# Patient Record
Sex: FEMALE | Race: WHITE | Employment: OTHER | URBAN - NONMETROPOLITAN AREA
[De-identification: names, ages, dates, MRNs, and addresses within clinical notes are randomized per-mention and may not be internally consistent; named-entity substitution may affect disease eponyms.]

---

## 2017-01-06 ENCOUNTER — CARE COORDINATION (OUTPATIENT)
Dept: CARE COORDINATION | Age: 77
End: 2017-01-06

## 2017-01-11 RX ORDER — PRAMIPEXOLE DIHYDROCHLORIDE 0.5 MG/1
0.5 TABLET ORAL 2 TIMES DAILY
Qty: 60 TABLET | Refills: 1 | Status: SHIPPED | OUTPATIENT
Start: 2017-01-11 | End: 2017-02-06 | Stop reason: SDUPTHER

## 2017-02-06 ENCOUNTER — OFFICE VISIT (OUTPATIENT)
Dept: PHYSICAL MEDICINE AND REHAB | Age: 77
End: 2017-02-06

## 2017-02-06 VITALS
BODY MASS INDEX: 27.79 KG/M2 | HEART RATE: 63 BPM | HEIGHT: 62 IN | SYSTOLIC BLOOD PRESSURE: 133 MMHG | WEIGHT: 151 LBS | DIASTOLIC BLOOD PRESSURE: 71 MMHG

## 2017-02-06 DIAGNOSIS — G20 PARKINSON'S DISEASE (HCC): Primary | ICD-10-CM

## 2017-02-06 PROCEDURE — 99213 OFFICE O/P EST LOW 20 MIN: CPT | Performed by: PSYCHIATRY & NEUROLOGY

## 2017-02-06 RX ORDER — PRAMIPEXOLE DIHYDROCHLORIDE 0.5 MG/1
0.5 TABLET ORAL 2 TIMES DAILY
Qty: 180 TABLET | Refills: 2 | Status: SHIPPED | OUTPATIENT
Start: 2017-02-06 | End: 2017-06-19 | Stop reason: SDUPTHER

## 2017-02-16 ENCOUNTER — CARE COORDINATION (OUTPATIENT)
Dept: CARE COORDINATION | Age: 77
End: 2017-02-16

## 2017-03-21 ENCOUNTER — CARE COORDINATION (OUTPATIENT)
Dept: CARE COORDINATION | Age: 77
End: 2017-03-21

## 2017-04-17 ENCOUNTER — OFFICE VISIT (OUTPATIENT)
Dept: FAMILY MEDICINE CLINIC | Age: 77
End: 2017-04-17

## 2017-04-17 VITALS
TEMPERATURE: 97.7 F | HEIGHT: 62 IN | SYSTOLIC BLOOD PRESSURE: 126 MMHG | BODY MASS INDEX: 29.88 KG/M2 | DIASTOLIC BLOOD PRESSURE: 60 MMHG | HEART RATE: 64 BPM | WEIGHT: 162.4 LBS | RESPIRATION RATE: 16 BRPM

## 2017-04-17 DIAGNOSIS — E03.9 ACQUIRED HYPOTHYROIDISM: ICD-10-CM

## 2017-04-17 DIAGNOSIS — H61.22 IMPACTED CERUMEN OF LEFT EAR: ICD-10-CM

## 2017-04-17 DIAGNOSIS — B35.1 TOENAIL FUNGUS: ICD-10-CM

## 2017-04-17 DIAGNOSIS — H60.542 ECZEMA OF EXTERNAL EAR, LEFT: ICD-10-CM

## 2017-04-17 DIAGNOSIS — L29.9 EAR ITCHING: Primary | ICD-10-CM

## 2017-04-17 LAB — KOH PREP: NORMAL

## 2017-04-17 PROCEDURE — G8510 SCR DEP NEG, NO PLAN REQD: HCPCS | Performed by: FAMILY MEDICINE

## 2017-04-17 PROCEDURE — 99214 OFFICE O/P EST MOD 30 MIN: CPT | Performed by: FAMILY MEDICINE

## 2017-04-17 PROCEDURE — 3288F FALL RISK ASSESSMENT DOCD: CPT | Performed by: FAMILY MEDICINE

## 2017-04-17 RX ORDER — LEVOTHYROXINE SODIUM 0.05 MG/1
TABLET ORAL
Qty: 90 TABLET | Refills: 3 | Status: SHIPPED | OUTPATIENT
Start: 2017-04-17 | End: 2018-04-17 | Stop reason: SDUPTHER

## 2017-04-17 ASSESSMENT — PATIENT HEALTH QUESTIONNAIRE - PHQ9
2. FEELING DOWN, DEPRESSED OR HOPELESS: 0
1. LITTLE INTEREST OR PLEASURE IN DOING THINGS: 0
SUM OF ALL RESPONSES TO PHQ QUESTIONS 1-9: 0
SUM OF ALL RESPONSES TO PHQ9 QUESTIONS 1 & 2: 0

## 2017-04-17 ASSESSMENT — ENCOUNTER SYMPTOMS
VOMITING: 0
DIARRHEA: 0
SHORTNESS OF BREATH: 0
COUGH: 0
WHEEZING: 0
ABDOMINAL PAIN: 0
CONSTIPATION: 0
NAUSEA: 0
BLOOD IN STOOL: 0
STRIDOR: 0

## 2017-04-20 ENCOUNTER — TELEPHONE (OUTPATIENT)
Dept: FAMILY MEDICINE CLINIC | Age: 77
End: 2017-04-20

## 2017-04-25 ENCOUNTER — PROCEDURE VISIT (OUTPATIENT)
Dept: FAMILY MEDICINE CLINIC | Age: 77
End: 2017-04-25

## 2017-04-25 VITALS
HEART RATE: 76 BPM | SYSTOLIC BLOOD PRESSURE: 126 MMHG | RESPIRATION RATE: 16 BRPM | BODY MASS INDEX: 29.55 KG/M2 | DIASTOLIC BLOOD PRESSURE: 60 MMHG | TEMPERATURE: 98.4 F | WEIGHT: 160.6 LBS | HEIGHT: 62 IN

## 2017-04-25 DIAGNOSIS — L60.0 INGROWN TOENAIL: Primary | ICD-10-CM

## 2017-04-25 PROCEDURE — 11730 AVULSION NAIL PLATE SIMPLE 1: CPT | Performed by: FAMILY MEDICINE

## 2017-04-26 ASSESSMENT — ENCOUNTER SYMPTOMS: COLOR CHANGE: 0

## 2017-04-27 ENCOUNTER — CARE COORDINATION (OUTPATIENT)
Dept: CARE COORDINATION | Age: 77
End: 2017-04-27

## 2017-05-18 LAB — FUNGUS IDENTIFIED: NORMAL

## 2017-05-31 ENCOUNTER — CARE COORDINATION (OUTPATIENT)
Dept: CARE COORDINATION | Age: 77
End: 2017-05-31

## 2017-06-19 ENCOUNTER — OFFICE VISIT (OUTPATIENT)
Dept: PHYSICAL MEDICINE AND REHAB | Age: 77
End: 2017-06-19

## 2017-06-19 VITALS
BODY MASS INDEX: 30.91 KG/M2 | DIASTOLIC BLOOD PRESSURE: 70 MMHG | WEIGHT: 168 LBS | SYSTOLIC BLOOD PRESSURE: 150 MMHG | HEART RATE: 65 BPM | HEIGHT: 62 IN

## 2017-06-19 DIAGNOSIS — G20 PARKINSON'S DISEASE (HCC): Primary | ICD-10-CM

## 2017-06-19 PROCEDURE — 99213 OFFICE O/P EST LOW 20 MIN: CPT | Performed by: PSYCHIATRY & NEUROLOGY

## 2017-06-19 RX ORDER — PRAMIPEXOLE DIHYDROCHLORIDE 0.5 MG/1
0.5 TABLET ORAL 2 TIMES DAILY
Qty: 180 TABLET | Refills: 2 | Status: SHIPPED | OUTPATIENT
Start: 2017-06-19 | End: 2017-12-18 | Stop reason: SDUPTHER

## 2017-08-17 ENCOUNTER — OFFICE VISIT (OUTPATIENT)
Dept: FAMILY MEDICINE CLINIC | Age: 77
End: 2017-08-17
Payer: MEDICARE

## 2017-08-17 VITALS
SYSTOLIC BLOOD PRESSURE: 112 MMHG | BODY MASS INDEX: 30.76 KG/M2 | TEMPERATURE: 97.9 F | OXYGEN SATURATION: 98 % | WEIGHT: 168.2 LBS | DIASTOLIC BLOOD PRESSURE: 60 MMHG | RESPIRATION RATE: 16 BRPM | HEART RATE: 62 BPM

## 2017-08-17 DIAGNOSIS — T63.484A ALLERGIC REACTION TO INSECT STING, UNDETERMINED INTENT, INITIAL ENCOUNTER: Primary | ICD-10-CM

## 2017-08-17 PROCEDURE — 99213 OFFICE O/P EST LOW 20 MIN: CPT | Performed by: FAMILY MEDICINE

## 2017-08-17 RX ORDER — TRIAMCINOLONE ACETONIDE 40 MG/ML
40 INJECTION, SUSPENSION INTRA-ARTICULAR; INTRAMUSCULAR ONCE
Status: COMPLETED | OUTPATIENT
Start: 2017-08-17 | End: 2017-08-17

## 2017-08-17 RX ADMIN — TRIAMCINOLONE ACETONIDE 40 MG: 40 INJECTION, SUSPENSION INTRA-ARTICULAR; INTRAMUSCULAR at 10:17

## 2017-08-18 ASSESSMENT — ENCOUNTER SYMPTOMS
STRIDOR: 0
DIARRHEA: 0
ABDOMINAL PAIN: 0
NAUSEA: 0
CONSTIPATION: 0
COUGH: 0
SHORTNESS OF BREATH: 0
WHEEZING: 0
BLOOD IN STOOL: 0
VOMITING: 0

## 2017-09-05 ENCOUNTER — TELEPHONE (OUTPATIENT)
Dept: FAMILY MEDICINE CLINIC | Age: 77
End: 2017-09-05

## 2017-09-05 DIAGNOSIS — D70.9 NEUTROPENIA, UNSPECIFIED TYPE (HCC): ICD-10-CM

## 2017-09-05 DIAGNOSIS — G25.81 RESTLESS LEG SYNDROME: ICD-10-CM

## 2017-09-05 DIAGNOSIS — E03.9 ACQUIRED HYPOTHYROIDISM: ICD-10-CM

## 2017-09-05 DIAGNOSIS — R25.2 MUSCLE CRAMP: Primary | ICD-10-CM

## 2017-09-07 ENCOUNTER — HOSPITAL ENCOUNTER (OUTPATIENT)
Age: 77
Discharge: HOME | End: 2017-09-07
Payer: MEDICARE

## 2017-09-07 DIAGNOSIS — D70.9 NEUTROPENIA, UNSPECIFIED TYPE (HCC): ICD-10-CM

## 2017-09-07 DIAGNOSIS — E03.9 ACQUIRED HYPOTHYROIDISM: ICD-10-CM

## 2017-09-07 DIAGNOSIS — G25.81 RESTLESS LEG SYNDROME: ICD-10-CM

## 2017-09-07 DIAGNOSIS — R25.2 MUSCLE CRAMP: ICD-10-CM

## 2017-09-07 LAB
ANION GAP SERPL CALCULATED.3IONS-SCNC: 13 MEQ/L (ref 8–16)
BASOPHILS # BLD: 0.4 %
BASOPHILS ABSOLUTE: 0 THOU/MM3 (ref 0–0.1)
BUN BLDV-MCNC: 14 MG/DL (ref 7–22)
CALCIUM SERPL-MCNC: 10.3 MG/DL (ref 8.5–10.5)
CHLORIDE BLD-SCNC: 103 MEQ/L (ref 98–111)
CO2: 29 MEQ/L (ref 23–33)
CREAT SERPL-MCNC: 0.7 MG/DL (ref 0.4–1.2)
EOSINOPHIL # BLD: 1.3 %
EOSINOPHILS ABSOLUTE: 0.1 THOU/MM3 (ref 0–0.4)
GFR SERPL CREATININE-BSD FRML MDRD: 81 ML/MIN/1.73M2
GLUCOSE BLD-MCNC: 92 MG/DL (ref 70–108)
HCT VFR BLD CALC: 41.9 % (ref 37–47)
HEMOGLOBIN: 14 GM/DL (ref 12–16)
LYMPHOCYTES # BLD: 40.7 %
LYMPHOCYTES ABSOLUTE: 2.2 THOU/MM3 (ref 1–4.8)
MAGNESIUM: 2.3 MG/DL (ref 1.6–2.4)
MCH RBC QN AUTO: 30.7 PG (ref 27–31)
MCHC RBC AUTO-ENTMCNC: 33.4 GM/DL (ref 33–37)
MCV RBC AUTO: 91.8 FL (ref 81–99)
MONOCYTES # BLD: 5.8 %
MONOCYTES ABSOLUTE: 0.3 THOU/MM3 (ref 0.4–1.3)
NUCLEATED RED BLOOD CELLS: 0 /100 WBC
PDW BLD-RTO: 13.2 % (ref 11.5–14.5)
PLATELET # BLD: 195 THOU/MM3 (ref 130–400)
PMV BLD AUTO: 8.8 MCM (ref 7.4–10.4)
POTASSIUM SERPL-SCNC: 4.1 MEQ/L (ref 3.5–5.2)
RBC # BLD: 4.56 MILL/MM3 (ref 4.2–5.4)
RBC # BLD: NORMAL 10*6/UL
SEG NEUTROPHILS: 51.8 %
SEGMENTED NEUTROPHILS ABSOLUTE COUNT: 2.8 THOU/MM3 (ref 1.8–7.7)
SODIUM BLD-SCNC: 145 MEQ/L (ref 135–145)
TSH SERPL DL<=0.05 MIU/L-ACNC: 2.89 UIU/ML (ref 0.4–4.2)
WBC # BLD: 5.5 THOU/MM3 (ref 4.8–10.8)

## 2017-09-07 PROCEDURE — 80048 BASIC METABOLIC PNL TOTAL CA: CPT

## 2017-09-07 PROCEDURE — 36415 COLL VENOUS BLD VENIPUNCTURE: CPT

## 2017-09-07 PROCEDURE — 84443 ASSAY THYROID STIM HORMONE: CPT

## 2017-09-07 PROCEDURE — 83735 ASSAY OF MAGNESIUM: CPT

## 2017-09-07 PROCEDURE — 85025 COMPLETE CBC W/AUTO DIFF WBC: CPT

## 2017-09-08 ENCOUNTER — TELEPHONE (OUTPATIENT)
Dept: FAMILY MEDICINE CLINIC | Age: 77
End: 2017-09-08

## 2017-09-08 DIAGNOSIS — R25.2 CRAMP OF BOTH LOWER EXTREMITIES: Primary | ICD-10-CM

## 2017-09-08 DIAGNOSIS — G25.81 RESTLESS LEG SYNDROME: ICD-10-CM

## 2017-09-08 RX ORDER — GABAPENTIN 100 MG/1
100 CAPSULE ORAL NIGHTLY
Qty: 90 CAPSULE | Refills: 3 | Status: SHIPPED | OUTPATIENT
Start: 2017-09-08 | End: 2018-10-16 | Stop reason: SDUPTHER

## 2017-11-20 ENCOUNTER — OFFICE VISIT (OUTPATIENT)
Dept: FAMILY MEDICINE CLINIC | Age: 77
End: 2017-11-20
Payer: MEDICARE

## 2017-11-20 VITALS
SYSTOLIC BLOOD PRESSURE: 118 MMHG | RESPIRATION RATE: 16 BRPM | DIASTOLIC BLOOD PRESSURE: 60 MMHG | WEIGHT: 170 LBS | OXYGEN SATURATION: 98 % | TEMPERATURE: 97.7 F | BODY MASS INDEX: 31.09 KG/M2 | HEART RATE: 62 BPM

## 2017-11-20 DIAGNOSIS — R53.83 FATIGUE, UNSPECIFIED TYPE: ICD-10-CM

## 2017-11-20 DIAGNOSIS — G25.81 RESTLESS LEG SYNDROME: ICD-10-CM

## 2017-11-20 DIAGNOSIS — D70.9 NEUTROPENIA, UNSPECIFIED TYPE (HCC): ICD-10-CM

## 2017-11-20 DIAGNOSIS — Z91.81 AT HIGH RISK FOR FALLS: ICD-10-CM

## 2017-11-20 DIAGNOSIS — E03.9 ACQUIRED HYPOTHYROIDISM: ICD-10-CM

## 2017-11-20 DIAGNOSIS — G20 PARKINSON DISEASE (HCC): ICD-10-CM

## 2017-11-20 DIAGNOSIS — Z86.718 PERSONAL HISTORY OF DVT (DEEP VEIN THROMBOSIS): ICD-10-CM

## 2017-11-20 DIAGNOSIS — E78.00 PURE HYPERCHOLESTEROLEMIA: ICD-10-CM

## 2017-11-20 DIAGNOSIS — L82.1 SEBORRHEIC KERATOSES: Primary | ICD-10-CM

## 2017-11-20 PROCEDURE — 90662 IIV NO PRSV INCREASED AG IM: CPT | Performed by: FAMILY MEDICINE

## 2017-11-20 PROCEDURE — G0008 ADMIN INFLUENZA VIRUS VAC: HCPCS | Performed by: FAMILY MEDICINE

## 2017-11-20 PROCEDURE — 99214 OFFICE O/P EST MOD 30 MIN: CPT | Performed by: FAMILY MEDICINE

## 2017-11-20 RX ORDER — GABAPENTIN 100 MG/1
100 CAPSULE ORAL NIGHTLY
Qty: 90 CAPSULE | Refills: 3 | Status: CANCELLED | OUTPATIENT
Start: 2017-11-20

## 2017-11-20 RX ORDER — ROSUVASTATIN CALCIUM 10 MG/1
10 TABLET, COATED ORAL NIGHTLY
Qty: 90 TABLET | Refills: 3 | Status: SHIPPED | OUTPATIENT
Start: 2017-11-20 | End: 2018-10-22 | Stop reason: SDUPTHER

## 2017-11-20 RX ORDER — PRAMIPEXOLE DIHYDROCHLORIDE 0.5 MG/1
0.5 TABLET ORAL 2 TIMES DAILY
Qty: 180 TABLET | Refills: 2 | Status: CANCELLED | OUTPATIENT
Start: 2017-11-20

## 2017-11-20 RX ORDER — LEVOTHYROXINE SODIUM 0.05 MG/1
TABLET ORAL
Qty: 90 TABLET | Refills: 3 | Status: CANCELLED | OUTPATIENT
Start: 2017-11-20

## 2017-11-20 NOTE — PROGRESS NOTES
After obtaining consent, and per orders of Dr. Adithya Carrera, injection of Influenza HD 0.5mL IM was given in Left deltoid by Myrick Blizzard. Patient instructed to remain in clinic for 20 minutes afterwards, and to report any adverse reaction to me immediately.     VIS given

## 2017-11-20 NOTE — PROGRESS NOTES
Alex Sams is a 68 y.o. female who presents today for:   Chief Complaint   Patient presents with    Annual Exam    Nevus     left inside pinky finger no changes that she has noticed iritates rings         HPI:     HPI     Having some fatigue and mild nausea for about 1 mos  Thought maybe was due to thyroid or maybe diverticulosis. Changed diet to more fruits and vegetables, avoided nuts and corn, and after 2 weeks, has felt significantly better. Moderately severe symptoms. Mole on L pinky finger present for yrs, not changing. Irritated by ring on ring finger frequenly. Parkinson's- stable. Managed by neurology  No tremor. Movement overall pretty good. Leg cramps/RLS - doing well on current meds. Fell onto R knee 3 wks ago. No swelling. Mild pain. Was walking up steps carrying sewing machine and didn't raise leg high enough and tripped. H/o - No signs of recurrent DVT.'    Had DEXA done with [facility]    HLD- tolerating statin without myalgias. Lab Results   Component Value Date    CHOL 158 11/25/2016    CHOL 217 (H) 06/28/2016    CHOL 239 (H) 04/01/2016     Lab Results   Component Value Date    TRIG 254 (H) 11/25/2016    TRIG 280 (H) 06/28/2016    TRIG 357 (H) 04/01/2016     Lab Results   Component Value Date    HDL 43 11/25/2016    HDL 46 06/28/2016    HDL 37 04/01/2016     Lab Results   Component Value Date    LDLCALC 64 11/25/2016    LDLCALC 115 06/28/2016    LDLCALC 131 04/01/2016     No results found for: LABVLDL, VLDL  No results found for: CHOLHDLRATIO    Neutropenia- resolved on recent cbc    Lab Results   Component Value Date    WBC 5.5 09/07/2017    HGB 14.0 09/07/2017    HCT 41.9 09/07/2017    MCV 91.8 09/07/2017     09/07/2017           Patient's medications, allergies, past medical, surgical, social, and family histories were reviewed and updated as appropriate.     Outpatient Medications Prior to Visit   Medication Sig Dispense Refill    gabapentin (NEURONTIN) 100 MG capsule Take 1 capsule by mouth nightly For leg cramps 90 capsule 3    pramipexole (MIRAPEX) 0.5 MG tablet Take 1 tablet by mouth 2 times daily 180 tablet 2    Coenzyme Q10 (CO Q 10) 100 MG CAPS Take by mouth daily      levothyroxine (SYNTHROID) 50 MCG tablet TAKE 1 TABLET DAILY FOR LOW THYROID 90 tablet 3    ACETAMINOPHEN PO Take by mouth as needed      Probiotic Product (PROBIOTIC PO) Take 1 tablet by mouth daily Balance Propriety Probiotic Blend      rosuvastatin (CRESTOR) 10 MG tablet Take 1 tablet by mouth nightly 90 tablet 3    Calcium Carbonate (CALCIUM 600 PO) Take 600 mg by mouth daily      Cholecalciferol (VITAMIN D3) 2000 UNITS CAPS Take 2,000 Units by mouth daily      NONFORMULARY Bone restore 4 tab per day. 1000 IU Vit D3  And 300 mg Magnesium      Ascorbic Acid (VITAMIN C) 500 MG tablet Take 1,000 mg by mouth daily.  COLLAGEN PO Take by mouth Collagen C 3 tablets per day (joints)       fexofenadine (ALLEGRA) 60 MG tablet Take 60 mg by mouth daily as needed       No facility-administered medications prior to visit. Subjective:      Review of Systems   Constitutional: Positive for fatigue. Negative for activity change, appetite change, chills, diaphoresis, fever and unexpected weight change. Respiratory: Negative for cough, shortness of breath, wheezing and stridor. Cardiovascular: Negative for chest pain, palpitations and leg swelling. Gastrointestinal: Negative for abdominal pain, blood in stool, constipation, diarrhea, nausea and vomiting. Neurological: Negative for headaches.        Objective:     Vitals:    11/20/17 0913   BP: 118/60   Site: Right Arm   Position: Sitting   Cuff Size: Medium Adult   Pulse: 62   Resp: 16   Temp: 97.7 °F (36.5 °C)   TempSrc: Oral   SpO2: 98%   Weight: 170 lb (77.1 kg)     Wt Readings from Last 3 Encounters:   11/20/17 170 lb (77.1 kg)   08/17/17 168 lb 3.2 oz (76.3 kg)   06/19/17 168 lb (76.2 kg)     Physical Exam   Constitutional: She is oriented to person, place, and time. She appears well-developed and well-nourished. No distress. HENT:   Head: Normocephalic and atraumatic. Nose: Nose normal.   Mouth/Throat: Uvula is midline, oropharynx is clear and moist and mucous membranes are normal.   B/l ear canals 80% obstructed with wax. Flushed by MA   Eyes: Conjunctivae are normal. Right eye exhibits no discharge. Left eye exhibits no discharge. Neck: Normal range of motion. Neck supple. No tracheal deviation present. No thyromegaly present. Cardiovascular: Normal rate, regular rhythm and normal heart sounds. Exam reveals no gallop and no friction rub. No murmur heard. Pulmonary/Chest: Effort normal and breath sounds normal. No stridor. No respiratory distress. She has no wheezes. She has no rales. Abdominal: Soft. She exhibits no distension and no mass. There is no tenderness. There is no rebound and no guarding. Musculoskeletal: She exhibits no edema. Lymphadenopathy:     She has no cervical adenopathy. Neurological: She is alert and oriented to person, place, and time. Skin: Skin is warm and dry. No rash noted. She is not diaphoretic. Medial aspect L 5th finger with slightly raised tan SK   Psychiatric: She has a normal mood and affect. Her behavior is normal. Judgment and thought content normal.   Nursing note and vitals reviewed. Assessment/Plan:     1. Seborrheic keratoses     2. Fatigue, unspecified type     3. Pure hypercholesterolemia  rosuvastatin (CRESTOR) 10 MG tablet    Lipid Panel    Hepatic Function Panel   4. Acquired hypothyroidism     5. Restless leg syndrome     6. Parkinson disease ([address]     7. Neutropenia, unspecified type ([address]     8. At high risk for falls     9. Personal history of DVT (deep vein thrombosis)       As fatigue symptoms improved, wishes to monitor for now. Discussed monitoring SK vs referral to plastic surgery. Pt plans to monitor for now. Check above labs.     Keep f/u

## 2017-11-22 ASSESSMENT — ENCOUNTER SYMPTOMS
WHEEZING: 0
BLOOD IN STOOL: 0
DIARRHEA: 0
STRIDOR: 0
SHORTNESS OF BREATH: 0
CONSTIPATION: 0
VOMITING: 0
NAUSEA: 0
COUGH: 0
ABDOMINAL PAIN: 0

## 2017-11-28 ENCOUNTER — HOSPITAL ENCOUNTER (OUTPATIENT)
Age: 77
Discharge: HOME | End: 2017-11-28
Payer: MEDICARE

## 2017-11-28 LAB
ALBUMIN SERPL-MCNC: 4.3 G/DL (ref 3.5–5.1)
ALP BLD-CCNC: 56 U/L (ref 38–126)
ALT SERPL-CCNC: 15 U/L (ref 11–66)
AST SERPL-CCNC: 22 U/L (ref 5–40)
BILIRUB SERPL-MCNC: 0.7 MG/DL (ref 0.3–1.2)
BILIRUBIN DIRECT: < 0.2 MG/DL (ref 0–0.3)
CHOLESTEROL, TOTAL: 167 MG/DL (ref 100–199)
HDLC SERPL-MCNC: 53 MG/DL
LDL CHOLESTEROL CALCULATED: 72 MG/DL
TOTAL PROTEIN: 6.9 G/DL (ref 6.1–8)
TRIGL SERPL-MCNC: 209 MG/DL (ref 0–199)

## 2017-11-28 PROCEDURE — 36415 COLL VENOUS BLD VENIPUNCTURE: CPT

## 2017-11-28 PROCEDURE — 80076 HEPATIC FUNCTION PANEL: CPT

## 2017-11-28 PROCEDURE — 80061 LIPID PANEL: CPT

## 2017-12-18 ENCOUNTER — OFFICE VISIT (OUTPATIENT)
Dept: NEUROLOGY | Age: 77
End: 2017-12-18
Payer: MEDICARE

## 2017-12-18 VITALS
DIASTOLIC BLOOD PRESSURE: 66 MMHG | BODY MASS INDEX: 31.47 KG/M2 | WEIGHT: 171 LBS | HEART RATE: 74 BPM | HEIGHT: 62 IN | SYSTOLIC BLOOD PRESSURE: 136 MMHG

## 2017-12-18 DIAGNOSIS — G20 PARKINSON DISEASE (HCC): Primary | ICD-10-CM

## 2017-12-18 DIAGNOSIS — G25.81 RESTLESS LEG SYNDROME: ICD-10-CM

## 2017-12-18 PROCEDURE — 99213 OFFICE O/P EST LOW 20 MIN: CPT | Performed by: PSYCHIATRY & NEUROLOGY

## 2017-12-18 RX ORDER — PRAMIPEXOLE DIHYDROCHLORIDE 0.5 MG/1
0.5 TABLET ORAL 2 TIMES DAILY
Qty: 180 TABLET | Refills: 2 | Status: SHIPPED | OUTPATIENT
Start: 2017-12-18 | End: 2018-06-18 | Stop reason: SDUPTHER

## 2017-12-18 NOTE — PROGRESS NOTES
Disp: , Rfl:       PE:   Vitals:    12/18/17 1302   BP: 136/66   Site: Left Arm   Position: Sitting   Cuff Size: Medium Adult   Pulse: 74   Weight: 171 lb (77.6 kg)   Height: 5' 2\" (1.575 m)     General Appearance:  alert and cooperative  Gen: AO3, NAD, Language is Intact. Head: PERRL, EOMI, no icterus  Neck: There is no carotid bruits. The Neck is supple. Neuro: CN 2-12 grossly intact with no focal deficits. Power 5/5 Throughout symmetric, Reflexes are symmetric. Long tracts are intact. Cerebellar exam is Intact. Sensory exam is intact to light touch. Gait is intact. There is no resting tremor, no pinrolling, mild bradykinesia, no Hypohonia, normal blink rate, no difficulty exiting chair, normal arm swing, no Gait shuffling. Musculoskeletal:  Has no hand arthritis, no limitation of ROM in any of the four extremities. Lower extremities no edema        DATA:  Results for orders placed or performed during the hospital encounter of 11/28/17   Hepatic Function Panel   Result Value Ref Range    Alb 4.3 3.5 - 5.1 g/dL    Total Bilirubin 0.7 0.3 - 1.2 mg/dL    Bilirubin, Direct <0.2 0.0 - 0.3 mg/dL    Alkaline Phosphatase 56 38 - 126 U/L    AST 22 5 - 40 U/L    ALT 15 11 - 66 U/L    Total Protein 6.9 6.1 - 8.0 g/dL   Lipid Panel   Result Value Ref Range    Cholesterol, Total 167 100 - 199 mg/dL    Triglycerides 209 (H) 0 - 199 mg/dL    HDL 53 mg/dL    LDL Calculated 72 mg/dL            Assessment:    1. Parkinson disease ([address]     2. Restless leg syndrome        She is doing well. He is pleased with how she is doing. No side effects to the medication. She is pleased with how she is doing. After detailed discussion with patient we agreed on the following plan. Plan:  1. Continue with Mirapex 0.5 mg twice a day. Refills given. 2. Stay Active. 3.  Follow up in 6 months or sooner if needed. 4. Call if any questions or concerns. Please call if any questions.      Christy Chapman MD

## 2017-12-18 NOTE — PATIENT INSTRUCTIONS
1.  Continue with Mirapex 0.5 mg twice a day. Refills given. 2. Stay Active. 3.  Follow up in 6 months or sooner if needed. 4. Call if any questions or concerns.

## 2018-04-21 ENCOUNTER — APPOINTMENT (OUTPATIENT)
Dept: CT IMAGING | Age: 78
DRG: 392 | End: 2018-04-21
Payer: MEDICARE

## 2018-04-21 ENCOUNTER — HOSPITAL ENCOUNTER (INPATIENT)
Age: 78
LOS: 2 days | Discharge: HOME HEALTH | DRG: 392 | End: 2018-04-24
Attending: EMERGENCY MEDICINE | Admitting: FAMILY MEDICINE
Payer: MEDICARE

## 2018-04-21 DIAGNOSIS — K57.92 ACUTE DIVERTICULITIS: Primary | ICD-10-CM

## 2018-04-21 DIAGNOSIS — R10.30 LOWER ABDOMINAL PAIN: ICD-10-CM

## 2018-04-21 LAB
ALBUMIN SERPL-MCNC: 3.4 GM/DL (ref 3.4–5)
ALP BLD-CCNC: 67 U/L (ref 46–116)
ALT SERPL-CCNC: 19 U/L (ref 14–63)
ANION GAP: 10 MEQ/L (ref 8–16)
AST SERPL-CCNC: 20 U/L (ref 15–37)
BASOPHILS # BLD: 0.4 % (ref 0–3)
BILIRUB SERPL-MCNC: 1.1 MG/DL (ref 0.2–1)
BUN BLDV-MCNC: 15 MG/DL (ref 7–18)
CHLORIDE BLD-SCNC: 101 MEQ/L (ref 98–107)
CO2: 27 MEQ/L (ref 21–32)
CREAT SERPL-MCNC: 0.8 MG/DL (ref 0.6–1.3)
EOSINOPHILS RELATIVE PERCENT: 0.7 % (ref 0–4)
GFR, ESTIMATED: 74 ML/MIN/1.73M2
GLUCOSE BLD-MCNC: 121 MG/DL (ref 74–106)
HCT VFR BLD CALC: 40.8 % (ref 37–47)
HEMOGLOBIN: 13.6 GM/DL (ref 12–16)
LIPASE: 91 U/L (ref 73–393)
LYMPHOCYTES # BLD: 13.9 % (ref 15–47)
MCH RBC QN AUTO: 29.6 PG (ref 27–31)
MCHC RBC AUTO-ENTMCNC: 33.2 GM/DL (ref 33–37)
MCV RBC AUTO: 89 FL (ref 81–99)
MONOCYTES: 5.4 % (ref 0–12)
PDW BLD-RTO: 13.2 % (ref 11.5–14.5)
PLATELET # BLD: 180 THOU/MM3 (ref 130–400)
PMV BLD AUTO: 7.9 FL (ref 7.4–10.4)
POC CALCIUM: 8.5 MG/DL (ref 8.5–10.1)
POTASSIUM SERPL-SCNC: 4 MEQ/L (ref 3.5–5.1)
RBC # BLD: 4.58 MILL/MM3 (ref 4.2–5.4)
SEGS: 79.6 % (ref 43–75)
SODIUM BLD-SCNC: 138 MEQ/L (ref 136–145)
TOTAL PROTEIN: 7.2 GM/DL (ref 6.4–8.2)
WBC # BLD: 11.7 THOU/MM3 (ref 4.8–10.8)

## 2018-04-21 PROCEDURE — 6360000002 HC RX W HCPCS: Performed by: EMERGENCY MEDICINE

## 2018-04-21 PROCEDURE — 80053 COMPREHEN METABOLIC PANEL: CPT

## 2018-04-21 PROCEDURE — 36415 COLL VENOUS BLD VENIPUNCTURE: CPT

## 2018-04-21 PROCEDURE — 87086 URINE CULTURE/COLONY COUNT: CPT

## 2018-04-21 PROCEDURE — 74177 CT ABD & PELVIS W/CONTRAST: CPT

## 2018-04-21 PROCEDURE — 83690 ASSAY OF LIPASE: CPT

## 2018-04-21 PROCEDURE — 81001 URINALYSIS AUTO W/SCOPE: CPT

## 2018-04-21 PROCEDURE — 85025 COMPLETE CBC W/AUTO DIFF WBC: CPT

## 2018-04-21 PROCEDURE — 96375 TX/PRO/DX INJ NEW DRUG ADDON: CPT

## 2018-04-21 PROCEDURE — 2580000003 HC RX 258: Performed by: EMERGENCY MEDICINE

## 2018-04-21 PROCEDURE — 6360000004 HC RX CONTRAST MEDICATION: Performed by: EMERGENCY MEDICINE

## 2018-04-21 PROCEDURE — 99285 EMERGENCY DEPT VISIT HI MDM: CPT

## 2018-04-21 RX ORDER — 0.9 % SODIUM CHLORIDE 0.9 %
1000 INTRAVENOUS SOLUTION INTRAVENOUS ONCE
Status: COMPLETED | OUTPATIENT
Start: 2018-04-21 | End: 2018-04-22

## 2018-04-21 RX ORDER — ONDANSETRON 2 MG/ML
4 INJECTION INTRAMUSCULAR; INTRAVENOUS ONCE
Status: COMPLETED | OUTPATIENT
Start: 2018-04-21 | End: 2018-04-21

## 2018-04-21 RX ADMIN — IOPAMIDOL 100 ML: 755 INJECTION, SOLUTION INTRAVENOUS at 23:52

## 2018-04-21 RX ADMIN — ONDANSETRON 4 MG: 2 INJECTION INTRAMUSCULAR; INTRAVENOUS at 22:49

## 2018-04-21 RX ADMIN — HYDROMORPHONE HYDROCHLORIDE 0.5 MG: 1 INJECTION, SOLUTION INTRAMUSCULAR; INTRAVENOUS; SUBCUTANEOUS at 22:52

## 2018-04-21 RX ADMIN — SODIUM CHLORIDE 1000 ML: 9 INJECTION, SOLUTION INTRAVENOUS at 22:51

## 2018-04-21 ASSESSMENT — ENCOUNTER SYMPTOMS
ABDOMINAL PAIN: 1
BLOOD IN STOOL: 0
TROUBLE SWALLOWING: 0
SHORTNESS OF BREATH: 0
SORE THROAT: 0
NAUSEA: 1
DIARRHEA: 0
VOMITING: 0
WHEEZING: 0

## 2018-04-21 ASSESSMENT — PAIN SCALES - GENERAL
PAINLEVEL_OUTOF10: 10
PAINLEVEL_OUTOF10: 2
PAINLEVEL_OUTOF10: 10

## 2018-04-21 ASSESSMENT — PAIN DESCRIPTION - LOCATION
LOCATION: ABDOMEN
LOCATION: ABDOMEN

## 2018-04-21 ASSESSMENT — PAIN DESCRIPTION - PAIN TYPE
TYPE: ACUTE PAIN
TYPE: ACUTE PAIN

## 2018-04-21 ASSESSMENT — PAIN DESCRIPTION - ORIENTATION: ORIENTATION: LOWER

## 2018-04-22 PROBLEM — K57.92 ACUTE DIVERTICULITIS: Status: ACTIVE | Noted: 2018-04-22

## 2018-04-22 PROBLEM — R10.32 LEFT LOWER QUADRANT PAIN: Status: ACTIVE | Noted: 2018-04-22

## 2018-04-22 PROBLEM — N39.0 UTI (URINARY TRACT INFECTION): Status: ACTIVE | Noted: 2018-04-22

## 2018-04-22 LAB
AMORPHOUS: ABNORMAL
BACTERIA: ABNORMAL
BILIRUBIN URINE: NEGATIVE
BLOOD, URINE: ABNORMAL
CASTS UA: ABNORMAL /LPF
CHARACTER, URINE: CLEAR
COLOR: YELLOW
CRYSTALS, UA: ABNORMAL
EPITHELIAL CELLS, UA: ABNORMAL /HPF
GLUCOSE, URINE: NEGATIVE MG/DL
KETONES, URINE: 15
LACTIC ACID: 0.7 MMOL/L (ref 0.5–2.2)
LEUKOCYTE ESTERASE, URINE: ABNORMAL
MUCUS: ABNORMAL
NITRITE, URINE: NEGATIVE
PH UA: 6.5 (ref 5–9)
PROTEIN UA: NEGATIVE MG/DL
RBC UA: ABNORMAL /HPF
REFLEX TO URINE C & S: ABNORMAL
SPECIFIC GRAVITY UA: 1.01 (ref 1–1.03)
UROBILINOGEN, URINE: 0.2 EU/DL (ref 0–1)
WBC UA: ABNORMAL /HPF

## 2018-04-22 PROCEDURE — 6360000002 HC RX W HCPCS: Performed by: FAMILY MEDICINE

## 2018-04-22 PROCEDURE — 6370000000 HC RX 637 (ALT 250 FOR IP): Performed by: FAMILY MEDICINE

## 2018-04-22 PROCEDURE — G0378 HOSPITAL OBSERVATION PER HR: HCPCS

## 2018-04-22 PROCEDURE — 96367 TX/PROPH/DG ADDL SEQ IV INF: CPT

## 2018-04-22 PROCEDURE — 1200000000 HC SEMI PRIVATE

## 2018-04-22 PROCEDURE — 83605 ASSAY OF LACTIC ACID: CPT

## 2018-04-22 PROCEDURE — 96365 THER/PROPH/DIAG IV INF INIT: CPT

## 2018-04-22 PROCEDURE — 2500000003 HC RX 250 WO HCPCS: Performed by: EMERGENCY MEDICINE

## 2018-04-22 PROCEDURE — 2580000003 HC RX 258: Performed by: FAMILY MEDICINE

## 2018-04-22 PROCEDURE — 99222 1ST HOSP IP/OBS MODERATE 55: CPT | Performed by: FAMILY MEDICINE

## 2018-04-22 PROCEDURE — 6360000002 HC RX W HCPCS: Performed by: EMERGENCY MEDICINE

## 2018-04-22 PROCEDURE — 36415 COLL VENOUS BLD VENIPUNCTURE: CPT

## 2018-04-22 RX ORDER — ROSUVASTATIN CALCIUM 10 MG/1
10 TABLET, COATED ORAL NIGHTLY
Status: DISCONTINUED | OUTPATIENT
Start: 2018-04-22 | End: 2018-04-24 | Stop reason: HOSPADM

## 2018-04-22 RX ORDER — PRAMIPEXOLE DIHYDROCHLORIDE 0.25 MG/1
0.5 TABLET ORAL 2 TIMES DAILY
Status: DISCONTINUED | OUTPATIENT
Start: 2018-04-22 | End: 2018-04-24 | Stop reason: HOSPADM

## 2018-04-22 RX ORDER — GABAPENTIN 100 MG/1
100 CAPSULE ORAL NIGHTLY
Status: DISCONTINUED | OUTPATIENT
Start: 2018-04-22 | End: 2018-04-24 | Stop reason: HOSPADM

## 2018-04-22 RX ORDER — ONDANSETRON 2 MG/ML
4 INJECTION INTRAMUSCULAR; INTRAVENOUS EVERY 6 HOURS PRN
Status: DISCONTINUED | OUTPATIENT
Start: 2018-04-22 | End: 2018-04-24 | Stop reason: HOSPADM

## 2018-04-22 RX ORDER — MORPHINE SULFATE 2 MG/ML
1 INJECTION, SOLUTION INTRAMUSCULAR; INTRAVENOUS
Status: ACTIVE | OUTPATIENT
Start: 2018-04-22 | End: 2018-04-23

## 2018-04-22 RX ORDER — SODIUM CHLORIDE 0.9 % (FLUSH) 0.9 %
10 SYRINGE (ML) INJECTION EVERY 12 HOURS SCHEDULED
Status: DISCONTINUED | OUTPATIENT
Start: 2018-04-22 | End: 2018-04-24 | Stop reason: HOSPADM

## 2018-04-22 RX ORDER — ACETAMINOPHEN 325 MG/1
650 TABLET ORAL EVERY 4 HOURS PRN
Status: DISCONTINUED | OUTPATIENT
Start: 2018-04-22 | End: 2018-04-24 | Stop reason: HOSPADM

## 2018-04-22 RX ORDER — CIPROFLOXACIN 2 MG/ML
400 INJECTION, SOLUTION INTRAVENOUS EVERY 12 HOURS
Status: DISCONTINUED | OUTPATIENT
Start: 2018-04-22 | End: 2018-04-22

## 2018-04-22 RX ORDER — ASCORBIC ACID 500 MG
1000 TABLET ORAL DAILY
Status: DISCONTINUED | OUTPATIENT
Start: 2018-04-22 | End: 2018-04-24 | Stop reason: HOSPADM

## 2018-04-22 RX ORDER — SODIUM CHLORIDE 9 MG/ML
INJECTION, SOLUTION INTRAVENOUS CONTINUOUS
Status: ACTIVE | OUTPATIENT
Start: 2018-04-22 | End: 2018-04-22

## 2018-04-22 RX ORDER — MORPHINE SULFATE 2 MG/ML
2 INJECTION, SOLUTION INTRAMUSCULAR; INTRAVENOUS EVERY 4 HOURS PRN
Status: ACTIVE | OUTPATIENT
Start: 2018-04-22 | End: 2018-04-23

## 2018-04-22 RX ORDER — CIPROFLOXACIN 2 MG/ML
400 INJECTION, SOLUTION INTRAVENOUS ONCE
Status: COMPLETED | OUTPATIENT
Start: 2018-04-22 | End: 2018-04-22

## 2018-04-22 RX ORDER — CALCIUM CARBONATE 500(1250)
500 TABLET ORAL DAILY
Status: DISCONTINUED | OUTPATIENT
Start: 2018-04-22 | End: 2018-04-24 | Stop reason: HOSPADM

## 2018-04-22 RX ORDER — SODIUM CHLORIDE 0.9 % (FLUSH) 0.9 %
10 SYRINGE (ML) INJECTION PRN
Status: DISCONTINUED | OUTPATIENT
Start: 2018-04-22 | End: 2018-04-24 | Stop reason: HOSPADM

## 2018-04-22 RX ORDER — LEVOFLOXACIN 5 MG/ML
500 INJECTION, SOLUTION INTRAVENOUS EVERY 24 HOURS
Status: DISCONTINUED | OUTPATIENT
Start: 2018-04-22 | End: 2018-04-23

## 2018-04-22 RX ORDER — LACTOBACILLUS RHAMNOSUS GG 10B CELL
1 CAPSULE ORAL DAILY
Status: DISCONTINUED | OUTPATIENT
Start: 2018-04-22 | End: 2018-04-24 | Stop reason: HOSPADM

## 2018-04-22 RX ORDER — METRONIDAZOLE 500 MG/1
500 TABLET ORAL EVERY 8 HOURS SCHEDULED
Status: DISCONTINUED | OUTPATIENT
Start: 2018-04-22 | End: 2018-04-24 | Stop reason: HOSPADM

## 2018-04-22 RX ORDER — LEVOTHYROXINE SODIUM 0.05 MG/1
50 TABLET ORAL DAILY
Status: DISCONTINUED | OUTPATIENT
Start: 2018-04-22 | End: 2018-04-24 | Stop reason: HOSPADM

## 2018-04-22 RX ADMIN — LEVOTHYROXINE SODIUM 50 MCG: 50 TABLET ORAL at 10:04

## 2018-04-22 RX ADMIN — Medication 1000 MG: at 08:28

## 2018-04-22 RX ADMIN — VITAMIN D, TAB 1000IU (100/BT) 2000 UNITS: 25 TAB at 08:28

## 2018-04-22 RX ADMIN — ROSUVASTATIN CALCIUM 10 MG: 10 TABLET, FILM COATED ORAL at 20:26

## 2018-04-22 RX ADMIN — CALCIUM 500 MG: 500 TABLET ORAL at 08:29

## 2018-04-22 RX ADMIN — METRONIDAZOLE 500 MG: 500 TABLET, FILM COATED ORAL at 20:26

## 2018-04-22 RX ADMIN — Medication 1 CAPSULE: at 08:28

## 2018-04-22 RX ADMIN — CIPROFLOXACIN 400 MG: 2 INJECTION, SOLUTION INTRAVENOUS at 00:19

## 2018-04-22 RX ADMIN — METRONIDAZOLE 500 MG: 500 TABLET, FILM COATED ORAL at 13:25

## 2018-04-22 RX ADMIN — LEVOFLOXACIN 500 MG: 5 INJECTION, SOLUTION INTRAVENOUS at 11:57

## 2018-04-22 RX ADMIN — METRONIDAZOLE 500 MG: 500 TABLET, FILM COATED ORAL at 08:28

## 2018-04-22 RX ADMIN — PRAMIPEXOLE DIHYDROCHLORIDE 0.5 MG: 0.25 TABLET ORAL at 08:28

## 2018-04-22 RX ADMIN — SODIUM CHLORIDE: 9 INJECTION, SOLUTION INTRAVENOUS at 13:24

## 2018-04-22 RX ADMIN — METRONIDAZOLE 500 MG: 500 INJECTION, SOLUTION INTRAVENOUS at 01:20

## 2018-04-22 RX ADMIN — GABAPENTIN 100 MG: 100 CAPSULE ORAL at 20:26

## 2018-04-22 RX ADMIN — ACETAMINOPHEN 650 MG: 325 TABLET ORAL at 11:59

## 2018-04-22 RX ADMIN — PRAMIPEXOLE DIHYDROCHLORIDE 0.5 MG: 0.25 TABLET ORAL at 20:26

## 2018-04-22 RX ADMIN — ACETAMINOPHEN 650 MG: 325 TABLET ORAL at 16:44

## 2018-04-22 ASSESSMENT — PAIN SCALES - GENERAL
PAINLEVEL_OUTOF10: 2
PAINLEVEL_OUTOF10: 4
PAINLEVEL_OUTOF10: 4
PAINLEVEL_OUTOF10: 1
PAINLEVEL_OUTOF10: 0
PAINLEVEL_OUTOF10: 4
PAINLEVEL_OUTOF10: 3
PAINLEVEL_OUTOF10: 3

## 2018-04-22 ASSESSMENT — PAIN DESCRIPTION - PAIN TYPE
TYPE: ACUTE PAIN
TYPE: ACUTE PAIN

## 2018-04-22 ASSESSMENT — PAIN DESCRIPTION - LOCATION
LOCATION: ABDOMEN
LOCATION: ABDOMEN

## 2018-04-22 ASSESSMENT — PAIN DESCRIPTION - ORIENTATION: ORIENTATION: LOWER

## 2018-04-23 PROBLEM — K76.0 HEPATIC STEATOSIS: Status: ACTIVE | Noted: 2018-04-23

## 2018-04-23 LAB
ANION GAP SERPL CALCULATED.3IONS-SCNC: 12 MEQ/L (ref 8–16)
BASOPHILS # BLD: 0.3 %
BASOPHILS ABSOLUTE: 0 THOU/MM3 (ref 0–0.1)
BUN BLDV-MCNC: 13 MG/DL (ref 7–22)
CALCIUM SERPL-MCNC: 8.3 MG/DL (ref 8.5–10.5)
CHLORIDE BLD-SCNC: 108 MEQ/L (ref 98–111)
CO2: 22 MEQ/L (ref 23–33)
CREAT SERPL-MCNC: 0.7 MG/DL (ref 0.4–1.2)
EOSINOPHIL # BLD: 0.8 %
EOSINOPHILS ABSOLUTE: 0.1 THOU/MM3 (ref 0–0.4)
GFR SERPL CREATININE-BSD FRML MDRD: 81 ML/MIN/1.73M2
GLUCOSE BLD-MCNC: 94 MG/DL (ref 70–108)
HCT VFR BLD CALC: 32.4 % (ref 37–47)
HEMOGLOBIN: 10.7 GM/DL (ref 12–16)
LYMPHOCYTES # BLD: 24.3 %
LYMPHOCYTES ABSOLUTE: 2 THOU/MM3 (ref 1–4.8)
MCH RBC QN AUTO: 30.3 PG (ref 27–31)
MCHC RBC AUTO-ENTMCNC: 33.1 GM/DL (ref 33–37)
MCV RBC AUTO: 91.4 FL (ref 81–99)
MONOCYTES # BLD: 5.3 %
MONOCYTES ABSOLUTE: 0.4 THOU/MM3 (ref 0.4–1.3)
NUCLEATED RED BLOOD CELLS: 0 /100 WBC
ORGANISM: ABNORMAL
PDW BLD-RTO: 13.7 % (ref 11.5–14.5)
PLATELET # BLD: 142 THOU/MM3 (ref 130–400)
PMV BLD AUTO: 9 FL (ref 7.4–10.4)
POTASSIUM REFLEX MAGNESIUM: 4.3 MEQ/L (ref 3.5–5.2)
RBC # BLD: 3.54 MILL/MM3 (ref 4.2–5.4)
SEG NEUTROPHILS: 69.3 %
SEGMENTED NEUTROPHILS ABSOLUTE COUNT: 5.7 THOU/MM3 (ref 1.8–7.7)
SODIUM BLD-SCNC: 142 MEQ/L (ref 135–145)
URINE CULTURE REFLEX: ABNORMAL
WBC # BLD: 8.2 THOU/MM3 (ref 4.8–10.8)

## 2018-04-23 PROCEDURE — 6360000002 HC RX W HCPCS: Performed by: FAMILY MEDICINE

## 2018-04-23 PROCEDURE — 80048 BASIC METABOLIC PNL TOTAL CA: CPT

## 2018-04-23 PROCEDURE — G0378 HOSPITAL OBSERVATION PER HR: HCPCS

## 2018-04-23 PROCEDURE — 2580000003 HC RX 258: Performed by: FAMILY MEDICINE

## 2018-04-23 PROCEDURE — 96366 THER/PROPH/DIAG IV INF ADDON: CPT

## 2018-04-23 PROCEDURE — 85025 COMPLETE CBC W/AUTO DIFF WBC: CPT

## 2018-04-23 PROCEDURE — 6370000000 HC RX 637 (ALT 250 FOR IP): Performed by: FAMILY MEDICINE

## 2018-04-23 PROCEDURE — 99232 SBSQ HOSP IP/OBS MODERATE 35: CPT | Performed by: INTERNAL MEDICINE

## 2018-04-23 PROCEDURE — 1200000000 HC SEMI PRIVATE

## 2018-04-23 PROCEDURE — 36415 COLL VENOUS BLD VENIPUNCTURE: CPT

## 2018-04-23 RX ORDER — CIPROFLOXACIN 2 MG/ML
400 INJECTION, SOLUTION INTRAVENOUS EVERY 12 HOURS
Status: DISCONTINUED | OUTPATIENT
Start: 2018-04-24 | End: 2018-04-24 | Stop reason: HOSPADM

## 2018-04-23 RX ADMIN — VITAMIN D, TAB 1000IU (100/BT) 2000 UNITS: 25 TAB at 09:22

## 2018-04-23 RX ADMIN — Medication 1000 MG: at 09:22

## 2018-04-23 RX ADMIN — LEVOFLOXACIN 500 MG: 5 INJECTION, SOLUTION INTRAVENOUS at 11:48

## 2018-04-23 RX ADMIN — Medication 1 CAPSULE: at 09:22

## 2018-04-23 RX ADMIN — METRONIDAZOLE 500 MG: 500 TABLET, FILM COATED ORAL at 13:41

## 2018-04-23 RX ADMIN — METRONIDAZOLE 500 MG: 500 TABLET, FILM COATED ORAL at 04:48

## 2018-04-23 RX ADMIN — PRAMIPEXOLE DIHYDROCHLORIDE 0.5 MG: 0.25 TABLET ORAL at 09:22

## 2018-04-23 RX ADMIN — Medication 10 ML: at 20:23

## 2018-04-23 RX ADMIN — PRAMIPEXOLE DIHYDROCHLORIDE 0.5 MG: 0.25 TABLET ORAL at 20:20

## 2018-04-23 RX ADMIN — CALCIUM 500 MG: 500 TABLET ORAL at 09:22

## 2018-04-23 RX ADMIN — METRONIDAZOLE 500 MG: 500 TABLET, FILM COATED ORAL at 22:07

## 2018-04-23 RX ADMIN — Medication 10 ML: at 11:48

## 2018-04-23 RX ADMIN — Medication 10 ML: at 13:07

## 2018-04-23 RX ADMIN — GABAPENTIN 100 MG: 100 CAPSULE ORAL at 20:20

## 2018-04-23 RX ADMIN — LEVOTHYROXINE SODIUM 50 MCG: 50 TABLET ORAL at 04:48

## 2018-04-23 RX ADMIN — ROSUVASTATIN CALCIUM 10 MG: 10 TABLET, FILM COATED ORAL at 20:20

## 2018-04-23 ASSESSMENT — ENCOUNTER SYMPTOMS
RECTAL PAIN: 0
ANAL BLEEDING: 0
CONSTIPATION: 0
SHORTNESS OF BREATH: 0
RHINORRHEA: 0
NAUSEA: 0
ABDOMINAL DISTENTION: 0
EYE PAIN: 0
VOMITING: 0
SINUS PRESSURE: 0
PHOTOPHOBIA: 0
SORE THROAT: 0
WHEEZING: 0
APNEA: 0
CHOKING: 0
COLOR CHANGE: 0
VOICE CHANGE: 0
ABDOMINAL PAIN: 1
EYE DISCHARGE: 0

## 2018-04-23 ASSESSMENT — PAIN SCALES - GENERAL
PAINLEVEL_OUTOF10: 0

## 2018-04-24 VITALS
WEIGHT: 169.8 LBS | SYSTOLIC BLOOD PRESSURE: 120 MMHG | BODY MASS INDEX: 32.06 KG/M2 | DIASTOLIC BLOOD PRESSURE: 58 MMHG | HEIGHT: 61 IN | RESPIRATION RATE: 16 BRPM | TEMPERATURE: 98.2 F | HEART RATE: 64 BPM | OXYGEN SATURATION: 93 %

## 2018-04-24 LAB
ALBUMIN SERPL-MCNC: 3 G/DL (ref 3.5–5.1)
ALP BLD-CCNC: 55 U/L (ref 38–126)
ALT SERPL-CCNC: 8 U/L (ref 11–66)
ANION GAP SERPL CALCULATED.3IONS-SCNC: 10 MEQ/L (ref 8–16)
AST SERPL-CCNC: 13 U/L (ref 5–40)
BILIRUB SERPL-MCNC: 0.3 MG/DL (ref 0.3–1.2)
BUN BLDV-MCNC: 10 MG/DL (ref 7–22)
CALCIUM SERPL-MCNC: 8.6 MG/DL (ref 8.5–10.5)
CHLORIDE BLD-SCNC: 107 MEQ/L (ref 98–111)
CO2: 23 MEQ/L (ref 23–33)
CREAT SERPL-MCNC: 0.7 MG/DL (ref 0.4–1.2)
GFR SERPL CREATININE-BSD FRML MDRD: 81 ML/MIN/1.73M2
GLUCOSE BLD-MCNC: 98 MG/DL (ref 70–108)
HCT VFR BLD CALC: 32.2 % (ref 37–47)
HEMOGLOBIN: 11.1 GM/DL (ref 12–16)
MCH RBC QN AUTO: 30.5 PG (ref 27–31)
MCHC RBC AUTO-ENTMCNC: 34.3 GM/DL (ref 33–37)
MCV RBC AUTO: 89.1 FL (ref 81–99)
PDW BLD-RTO: 13.3 % (ref 11.5–14.5)
PLATELET # BLD: 158 THOU/MM3 (ref 130–400)
PMV BLD AUTO: 9.1 FL (ref 7.4–10.4)
POTASSIUM SERPL-SCNC: 4 MEQ/L (ref 3.5–5.2)
RBC # BLD: 3.62 MILL/MM3 (ref 4.2–5.4)
SODIUM BLD-SCNC: 140 MEQ/L (ref 135–145)
TOTAL PROTEIN: 5.7 G/DL (ref 6.1–8)
WBC # BLD: 5.8 THOU/MM3 (ref 4.8–10.8)

## 2018-04-24 PROCEDURE — 6370000000 HC RX 637 (ALT 250 FOR IP): Performed by: FAMILY MEDICINE

## 2018-04-24 PROCEDURE — 96366 THER/PROPH/DIAG IV INF ADDON: CPT

## 2018-04-24 PROCEDURE — 85027 COMPLETE CBC AUTOMATED: CPT

## 2018-04-24 PROCEDURE — 80053 COMPREHEN METABOLIC PANEL: CPT

## 2018-04-24 PROCEDURE — 99239 HOSP IP/OBS DSCHRG MGMT >30: CPT | Performed by: INTERNAL MEDICINE

## 2018-04-24 PROCEDURE — 2580000003 HC RX 258: Performed by: FAMILY MEDICINE

## 2018-04-24 PROCEDURE — 6360000002 HC RX W HCPCS: Performed by: INTERNAL MEDICINE

## 2018-04-24 PROCEDURE — G0378 HOSPITAL OBSERVATION PER HR: HCPCS

## 2018-04-24 PROCEDURE — 36415 COLL VENOUS BLD VENIPUNCTURE: CPT

## 2018-04-24 RX ORDER — CIPROFLOXACIN 500 MG/1
500 TABLET, FILM COATED ORAL 2 TIMES DAILY
Qty: 14 TABLET | Refills: 0 | Status: SHIPPED | OUTPATIENT
Start: 2018-04-24 | End: 2020-01-09 | Stop reason: SDUPTHER

## 2018-04-24 RX ORDER — METRONIDAZOLE 500 MG/1
500 TABLET ORAL EVERY 8 HOURS SCHEDULED
Qty: 21 TABLET | Refills: 0 | Status: SHIPPED | OUTPATIENT
Start: 2018-04-24 | End: 2020-01-09 | Stop reason: SDUPTHER

## 2018-04-24 RX ADMIN — METRONIDAZOLE 500 MG: 500 TABLET, FILM COATED ORAL at 06:07

## 2018-04-24 RX ADMIN — CIPROFLOXACIN 400 MG: 2 INJECTION, SOLUTION INTRAVENOUS at 09:52

## 2018-04-24 RX ADMIN — Medication 1 CAPSULE: at 08:24

## 2018-04-24 RX ADMIN — PRAMIPEXOLE DIHYDROCHLORIDE 0.5 MG: 0.25 TABLET ORAL at 08:23

## 2018-04-24 RX ADMIN — VITAMIN D, TAB 1000IU (100/BT) 2000 UNITS: 25 TAB at 08:23

## 2018-04-24 RX ADMIN — CALCIUM 500 MG: 500 TABLET ORAL at 08:23

## 2018-04-24 RX ADMIN — LEVOTHYROXINE SODIUM 50 MCG: 50 TABLET ORAL at 06:07

## 2018-04-24 RX ADMIN — METRONIDAZOLE 500 MG: 500 TABLET, FILM COATED ORAL at 14:11

## 2018-04-24 RX ADMIN — Medication 1000 MG: at 08:23

## 2018-04-24 RX ADMIN — Medication 10 ML: at 09:52

## 2018-04-24 ASSESSMENT — PAIN SCALES - GENERAL: PAINLEVEL_OUTOF10: 0

## 2018-04-25 ENCOUNTER — TELEPHONE (OUTPATIENT)
Dept: PHARMACY | Facility: CLINIC | Age: 78
End: 2018-04-25

## 2018-04-25 DIAGNOSIS — K57.92 ACUTE DIVERTICULITIS: Primary | ICD-10-CM

## 2018-04-25 PROCEDURE — 1111F DSCHRG MED/CURRENT MED MERGE: CPT

## 2018-04-26 ENCOUNTER — TELEPHONE (OUTPATIENT)
Dept: FAMILY MEDICINE CLINIC | Age: 78
End: 2018-04-26

## 2018-04-30 ENCOUNTER — OFFICE VISIT (OUTPATIENT)
Dept: FAMILY MEDICINE CLINIC | Age: 78
End: 2018-04-30
Payer: MEDICARE

## 2018-04-30 VITALS
HEIGHT: 61 IN | HEART RATE: 68 BPM | OXYGEN SATURATION: 98 % | TEMPERATURE: 97 F | SYSTOLIC BLOOD PRESSURE: 138 MMHG | RESPIRATION RATE: 14 BRPM | DIASTOLIC BLOOD PRESSURE: 68 MMHG | WEIGHT: 168.6 LBS | BODY MASS INDEX: 31.83 KG/M2

## 2018-04-30 DIAGNOSIS — G20 PARKINSON DISEASE (HCC): ICD-10-CM

## 2018-04-30 DIAGNOSIS — K57.92 ACUTE DIVERTICULITIS: Primary | ICD-10-CM

## 2018-04-30 DIAGNOSIS — D64.9 NORMOCYTIC ANEMIA: ICD-10-CM

## 2018-04-30 DIAGNOSIS — M21.70 LEG LENGTH DISCREPANCY: ICD-10-CM

## 2018-04-30 DIAGNOSIS — M54.10 RADICULAR LOW BACK PAIN: ICD-10-CM

## 2018-04-30 PROCEDURE — 99495 TRANSJ CARE MGMT MOD F2F 14D: CPT | Performed by: FAMILY MEDICINE

## 2018-04-30 ASSESSMENT — PATIENT HEALTH QUESTIONNAIRE - PHQ9
SUM OF ALL RESPONSES TO PHQ QUESTIONS 1-9: 0
SUM OF ALL RESPONSES TO PHQ9 QUESTIONS 1 & 2: 0
1. LITTLE INTEREST OR PLEASURE IN DOING THINGS: 0
2. FEELING DOWN, DEPRESSED OR HOPELESS: 0

## 2018-05-01 ASSESSMENT — ENCOUNTER SYMPTOMS
NAUSEA: 0
BLOOD IN STOOL: 0
COUGH: 0
DIARRHEA: 0
SHORTNESS OF BREATH: 0
VOMITING: 0
ABDOMINAL PAIN: 0
WHEEZING: 0
STRIDOR: 0
CONSTIPATION: 0

## 2018-05-10 ENCOUNTER — HOSPITAL ENCOUNTER (OUTPATIENT)
Age: 78
Discharge: HOME | End: 2018-05-10
Payer: MEDICARE

## 2018-05-10 DIAGNOSIS — D64.9 NORMOCYTIC ANEMIA: ICD-10-CM

## 2018-05-10 LAB
BASOPHILS # BLD: 1.2 %
BASOPHILS ABSOLUTE: 0 THOU/MM3 (ref 0–0.1)
EOSINOPHIL # BLD: 2.1 %
EOSINOPHILS ABSOLUTE: 0.1 THOU/MM3 (ref 0–0.4)
HCT VFR BLD CALC: 39.1 % (ref 37–47)
HEMOGLOBIN: 13.1 GM/DL (ref 12–16)
LYMPHOCYTES # BLD: 41.7 %
LYMPHOCYTES ABSOLUTE: 1.5 THOU/MM3 (ref 1–4.8)
MCH RBC QN AUTO: 30.3 PG (ref 27–31)
MCHC RBC AUTO-ENTMCNC: 33.5 GM/DL (ref 33–37)
MCV RBC AUTO: 90.3 FL (ref 81–99)
MONOCYTES # BLD: 7 %
MONOCYTES ABSOLUTE: 0.3 THOU/MM3 (ref 0.4–1.3)
NUCLEATED RED BLOOD CELLS: 0 /100 WBC
PDW BLD-RTO: 13.9 % (ref 11.5–14.5)
PLATELET # BLD: 239 THOU/MM3 (ref 130–400)
PMV BLD AUTO: 9.2 FL (ref 7.4–10.4)
RBC # BLD: 4.33 MILL/MM3 (ref 4.2–5.4)
SEG NEUTROPHILS: 48 %
SEGMENTED NEUTROPHILS ABSOLUTE COUNT: 1.8 THOU/MM3 (ref 1.8–7.7)
WBC # BLD: 3.7 THOU/MM3 (ref 4.8–10.8)

## 2018-05-10 PROCEDURE — 85025 COMPLETE CBC W/AUTO DIFF WBC: CPT

## 2018-05-10 PROCEDURE — 36415 COLL VENOUS BLD VENIPUNCTURE: CPT

## 2018-05-11 ENCOUNTER — TELEPHONE (OUTPATIENT)
Dept: FAMILY MEDICINE CLINIC | Age: 78
End: 2018-05-11

## 2018-05-11 DIAGNOSIS — D72.819 LEUKOPENIA, UNSPECIFIED TYPE: Primary | ICD-10-CM

## 2018-05-22 PROBLEM — N39.0 UTI (URINARY TRACT INFECTION): Status: RESOLVED | Noted: 2018-04-22 | Resolved: 2018-05-22

## 2018-06-07 ENCOUNTER — HOSPITAL ENCOUNTER (OUTPATIENT)
Age: 78
Discharge: HOME | End: 2018-06-07
Payer: MEDICARE

## 2018-06-07 DIAGNOSIS — D72.819 LEUKOPENIA, UNSPECIFIED TYPE: ICD-10-CM

## 2018-06-07 LAB
BASOPHILS # BLD: 1 %
BASOPHILS ABSOLUTE: 0 THOU/MM3 (ref 0–0.1)
EOSINOPHIL # BLD: 1.6 %
EOSINOPHILS ABSOLUTE: 0.1 THOU/MM3 (ref 0–0.4)
HCT VFR BLD CALC: 40.5 % (ref 37–47)
HEMOGLOBIN: 13.5 GM/DL (ref 12–16)
LYMPHOCYTES # BLD: 41.2 %
LYMPHOCYTES ABSOLUTE: 1.8 THOU/MM3 (ref 1–4.8)
MCH RBC QN AUTO: 30.2 PG (ref 27–31)
MCHC RBC AUTO-ENTMCNC: 33.5 GM/DL (ref 33–37)
MCV RBC AUTO: 90.1 FL (ref 81–99)
MONOCYTES # BLD: 6.6 %
MONOCYTES ABSOLUTE: 0.3 THOU/MM3 (ref 0.4–1.3)
NUCLEATED RED BLOOD CELLS: 0 /100 WBC
PDW BLD-RTO: 13.7 % (ref 11.5–14.5)
PLATELET # BLD: 203 THOU/MM3 (ref 130–400)
PMV BLD AUTO: 9 FL (ref 7.4–10.4)
RBC # BLD: 4.49 MILL/MM3 (ref 4.2–5.4)
SEG NEUTROPHILS: 49.6 %
SEGMENTED NEUTROPHILS ABSOLUTE COUNT: 2.1 THOU/MM3 (ref 1.8–7.7)
WBC # BLD: 4.3 THOU/MM3 (ref 4.8–10.8)

## 2018-06-07 PROCEDURE — 85025 COMPLETE CBC W/AUTO DIFF WBC: CPT

## 2018-06-07 PROCEDURE — 36415 COLL VENOUS BLD VENIPUNCTURE: CPT

## 2018-06-14 ENCOUNTER — OFFICE VISIT (OUTPATIENT)
Dept: FAMILY MEDICINE CLINIC | Age: 78
End: 2018-06-14
Payer: MEDICARE

## 2018-06-14 ENCOUNTER — TELEPHONE (OUTPATIENT)
Dept: FAMILY MEDICINE CLINIC | Age: 78
End: 2018-06-14

## 2018-06-14 VITALS
RESPIRATION RATE: 14 BRPM | SYSTOLIC BLOOD PRESSURE: 148 MMHG | HEART RATE: 66 BPM | BODY MASS INDEX: 31.15 KG/M2 | HEIGHT: 61 IN | WEIGHT: 165 LBS | DIASTOLIC BLOOD PRESSURE: 80 MMHG | OXYGEN SATURATION: 96 %

## 2018-06-14 DIAGNOSIS — H81.10 BENIGN PAROXYSMAL POSITIONAL VERTIGO, UNSPECIFIED LATERALITY: ICD-10-CM

## 2018-06-14 DIAGNOSIS — E03.9 ACQUIRED HYPOTHYROIDISM: ICD-10-CM

## 2018-06-14 DIAGNOSIS — R26.89 IMBALANCE: Primary | ICD-10-CM

## 2018-06-14 LAB
ALBUMIN SERPL-MCNC: 4.6 G/DL (ref 3.5–5.1)
ALP BLD-CCNC: 64 U/L (ref 38–126)
ALT SERPL-CCNC: 18 U/L (ref 11–66)
ANION GAP SERPL CALCULATED.3IONS-SCNC: 12 MEQ/L (ref 8–16)
AST SERPL-CCNC: 23 U/L (ref 5–40)
BASOPHILS # BLD: 0.6 %
BASOPHILS ABSOLUTE: 0 THOU/MM3 (ref 0–0.1)
BILIRUB SERPL-MCNC: 0.5 MG/DL (ref 0.3–1.2)
BUN BLDV-MCNC: 15 MG/DL (ref 7–22)
CALCIUM SERPL-MCNC: 9.4 MG/DL (ref 8.5–10.5)
CHLORIDE BLD-SCNC: 101 MEQ/L (ref 98–111)
CO2: 27 MEQ/L (ref 23–33)
CREAT SERPL-MCNC: 0.7 MG/DL (ref 0.4–1.2)
EOSINOPHIL # BLD: 0.7 %
EOSINOPHILS ABSOLUTE: 0 THOU/MM3 (ref 0–0.4)
GFR SERPL CREATININE-BSD FRML MDRD: 81 ML/MIN/1.73M2
GLUCOSE BLD-MCNC: 98 MG/DL (ref 70–108)
HCT VFR BLD CALC: 41 % (ref 37–47)
HEMOGLOBIN: 13.8 GM/DL (ref 12–16)
LYMPHOCYTES # BLD: 34.8 %
LYMPHOCYTES ABSOLUTE: 1.6 THOU/MM3 (ref 1–4.8)
MCH RBC QN AUTO: 30.1 PG (ref 27–31)
MCHC RBC AUTO-ENTMCNC: 33.6 GM/DL (ref 33–37)
MCV RBC AUTO: 89.5 FL (ref 81–99)
MONOCYTES # BLD: 5.6 %
MONOCYTES ABSOLUTE: 0.3 THOU/MM3 (ref 0.4–1.3)
NUCLEATED RED BLOOD CELLS: 0 /100 WBC
PDW BLD-RTO: 13.3 % (ref 11.5–14.5)
PLATELET # BLD: 209 THOU/MM3 (ref 130–400)
PMV BLD AUTO: 9 FL (ref 7.4–10.4)
POTASSIUM SERPL-SCNC: 4.4 MEQ/L (ref 3.5–5.2)
RBC # BLD: 4.58 MILL/MM3 (ref 4.2–5.4)
SEG NEUTROPHILS: 58.3 %
SEGMENTED NEUTROPHILS ABSOLUTE COUNT: 2.7 THOU/MM3 (ref 1.8–7.7)
SODIUM BLD-SCNC: 140 MEQ/L (ref 135–145)
TOTAL PROTEIN: 7.5 G/DL (ref 6.1–8)
TSH SERPL DL<=0.05 MIU/L-ACNC: 1.89 UIU/ML (ref 0.4–4.2)
WBC # BLD: 4.6 THOU/MM3 (ref 4.8–10.8)

## 2018-06-14 PROCEDURE — 36415 COLL VENOUS BLD VENIPUNCTURE: CPT | Performed by: FAMILY MEDICINE

## 2018-06-14 PROCEDURE — 99214 OFFICE O/P EST MOD 30 MIN: CPT | Performed by: FAMILY MEDICINE

## 2018-06-14 PROCEDURE — 95992 CANALITH REPOSITIONING PROC: CPT | Performed by: FAMILY MEDICINE

## 2018-06-15 ASSESSMENT — ENCOUNTER SYMPTOMS
VOMITING: 0
ABDOMINAL PAIN: 0
STRIDOR: 0
CONSTIPATION: 0
NAUSEA: 1
DIARRHEA: 0
SHORTNESS OF BREATH: 0
WHEEZING: 0
BLOOD IN STOOL: 0
COUGH: 0

## 2018-06-18 ENCOUNTER — OFFICE VISIT (OUTPATIENT)
Dept: NEUROLOGY | Age: 78
End: 2018-06-18
Payer: MEDICARE

## 2018-06-18 VITALS
DIASTOLIC BLOOD PRESSURE: 80 MMHG | WEIGHT: 166 LBS | HEART RATE: 72 BPM | SYSTOLIC BLOOD PRESSURE: 128 MMHG | BODY MASS INDEX: 31.38 KG/M2

## 2018-06-18 DIAGNOSIS — G25.81 RESTLESS LEG SYNDROME: ICD-10-CM

## 2018-06-18 DIAGNOSIS — G20 PARKINSON DISEASE (HCC): Primary | ICD-10-CM

## 2018-06-18 PROCEDURE — 99213 OFFICE O/P EST LOW 20 MIN: CPT | Performed by: PSYCHIATRY & NEUROLOGY

## 2018-06-18 RX ORDER — PRAMIPEXOLE DIHYDROCHLORIDE 0.5 MG/1
0.5 TABLET ORAL 2 TIMES DAILY
Qty: 180 TABLET | Refills: 3 | Status: SHIPPED | OUTPATIENT
Start: 2018-06-18 | End: 2019-06-16 | Stop reason: SDUPTHER

## 2018-06-19 ENCOUNTER — TELEPHONE (OUTPATIENT)
Dept: FAMILY MEDICINE CLINIC | Age: 78
End: 2018-06-19

## 2018-06-19 DIAGNOSIS — H81.10 BENIGN PAROXYSMAL POSITIONAL VERTIGO, UNSPECIFIED LATERALITY: Primary | ICD-10-CM

## 2018-06-19 DIAGNOSIS — G20 PARKINSON DISEASE (HCC): ICD-10-CM

## 2018-06-19 DIAGNOSIS — R26.89 IMBALANCE: ICD-10-CM

## 2018-06-19 DIAGNOSIS — R42 VERTIGO: ICD-10-CM

## 2018-06-19 DIAGNOSIS — R25.1 TREMOR: ICD-10-CM

## 2018-06-26 ENCOUNTER — TELEPHONE (OUTPATIENT)
Dept: FAMILY MEDICINE CLINIC | Age: 78
End: 2018-06-26

## 2018-10-16 ENCOUNTER — OFFICE VISIT (OUTPATIENT)
Dept: FAMILY MEDICINE CLINIC | Age: 78
End: 2018-10-16
Payer: MEDICARE

## 2018-10-16 VITALS
DIASTOLIC BLOOD PRESSURE: 68 MMHG | BODY MASS INDEX: 31.79 KG/M2 | SYSTOLIC BLOOD PRESSURE: 126 MMHG | TEMPERATURE: 97.2 F | HEIGHT: 61 IN | WEIGHT: 168.4 LBS | HEART RATE: 68 BPM | RESPIRATION RATE: 12 BRPM

## 2018-10-16 DIAGNOSIS — E55.9 VITAMIN D DEFICIENCY: ICD-10-CM

## 2018-10-16 DIAGNOSIS — H61.23 BILATERAL IMPACTED CERUMEN: ICD-10-CM

## 2018-10-16 DIAGNOSIS — G25.81 RESTLESS LEG SYNDROME: ICD-10-CM

## 2018-10-16 DIAGNOSIS — G20 PARKINSON DISEASE (HCC): ICD-10-CM

## 2018-10-16 DIAGNOSIS — R25.2 CRAMP OF BOTH LOWER EXTREMITIES: Primary | ICD-10-CM

## 2018-10-16 DIAGNOSIS — E78.00 PURE HYPERCHOLESTEROLEMIA: ICD-10-CM

## 2018-10-16 DIAGNOSIS — E03.9 ACQUIRED HYPOTHYROIDISM: ICD-10-CM

## 2018-10-16 PROCEDURE — 99214 OFFICE O/P EST MOD 30 MIN: CPT | Performed by: FAMILY MEDICINE

## 2018-10-16 RX ORDER — GABAPENTIN 100 MG/1
100 CAPSULE ORAL NIGHTLY
Qty: 90 CAPSULE | Refills: 0 | Status: SHIPPED | OUTPATIENT
Start: 2018-10-16 | End: 2019-01-17 | Stop reason: SDUPTHER

## 2018-10-16 NOTE — PROGRESS NOTES
Explained ear wax removal procedure to patient with patient verbalizing understanding. Bilateral irrigation performed with warm irrigation fluid, noting  small amount of cerumen flushed from each ear successfully. Patient tolerated well. Ear canals now clear, tympanic membranes visible.

## 2018-10-16 NOTE — PROGRESS NOTES
No facility-administered medications prior to visit. Subjective:     Review of Systems   Constitutional: Positive for fatigue. Negative for activity change, appetite change, chills, diaphoresis, fever and unexpected weight change. HENT: Negative. Respiratory: Negative for cough, shortness of breath, wheezing and stridor. Cardiovascular: Negative for chest pain, palpitations and leg swelling. Gastrointestinal: Negative for abdominal pain, blood in stool, constipation, diarrhea, nausea and vomiting. Genitourinary: Negative. Skin: Negative. Neurological: Negative for headaches. Psychiatric/Behavioral: Negative. Objective:     Vitals:    10/16/18 0929   BP: 126/68   Site: Left Upper Arm   Position: Sitting   Cuff Size: Medium Adult   Pulse: 68   Resp: 12   Temp: 97.2 °F (36.2 °C)   TempSrc: Temporal   Weight: 168 lb 6.4 oz (76.4 kg)   Height: 5' 0.98\" (1.549 m)     Wt Readings from Last 3 Encounters:   10/16/18 168 lb 6.4 oz (76.4 kg)   06/18/18 166 lb (75.3 kg)   06/14/18 165 lb (74.8 kg)     Physical Exam   Constitutional: She is oriented to person, place, and time. She appears well-developed and well-nourished. No distress. HENT:   Head: Normocephalic and atraumatic. Right Ear: External ear normal.   Left Ear: External ear normal.   Nose: Nose normal.   Mouth/Throat: Uvula is midline, oropharynx is clear and moist and mucous membranes are normal.   B/l cerumen impaction- flushed by MA   Eyes: Conjunctivae are normal. Right eye exhibits no discharge. Left eye exhibits no discharge. Neck: Normal range of motion. Neck supple. No tracheal deviation present. No thyromegaly present. Cardiovascular: Normal rate, regular rhythm and normal heart sounds. Exam reveals no gallop and no friction rub. No murmur heard. Pulmonary/Chest: Effort normal and breath sounds normal. No stridor. No respiratory distress. She has no wheezes. She has no rales. Abdominal: Soft.  She exhibits no distension and no mass. There is no tenderness. There is no rebound and no guarding. Musculoskeletal: She exhibits no edema. Lymphadenopathy:     She has no cervical adenopathy. Neurological: She is alert and oriented to person, place, and time. Skin: Skin is warm and dry. No rash noted. She is not diaphoretic. Psychiatric: She has a normal mood and affect. Her behavior is normal. Judgment and thought content normal.   Nursing note and vitals reviewed. Assessment/Plan:      Diagnosis Orders   1. Cramp of both lower extremities  gabapentin (NEURONTIN) 100 MG capsule   2. Restless leg syndrome  gabapentin (NEURONTIN) 100 MG capsule   3. Parkinson disease ([address]     4. Pure hypercholesterolemia  Lipid Panel   5. Vitamin D deficiency  Vitamin D 25 Hydroxy   6. Acquired hypothyroidism     7. Bilateral impacted cerumen         Discussed trial of melatonin for sleep if needed. PD- well controlled    Chronic issues as above stable and control    Patient given educational materials- see patient instructions. Discussed use, benefit, and side effects of prescribedmedications. All patient questions answered. Pt voiced understanding. Reviewedhealth maintenance. Discussed medications, diet and exercise. Patient agreed withtreatment plan. Follow up as directed. Return in about 10 months (around 8/16/2019) for yearly.       (Please notethat portions of this note were completed with a voice recognition program. Effortswere made to edit the dictations but occasionally words are mis-transcribed.)

## 2018-10-19 ENCOUNTER — HOSPITAL ENCOUNTER (OUTPATIENT)
Age: 78
Discharge: HOME | End: 2018-10-19
Payer: MEDICARE

## 2018-10-19 DIAGNOSIS — E55.9 VITAMIN D DEFICIENCY: ICD-10-CM

## 2018-10-19 DIAGNOSIS — E78.00 PURE HYPERCHOLESTEROLEMIA: ICD-10-CM

## 2018-10-19 LAB
CHOLESTEROL, TOTAL: 164 MG/DL (ref 100–199)
HDLC SERPL-MCNC: 51 MG/DL
LDL CHOLESTEROL CALCULATED: 78 MG/DL
TRIGL SERPL-MCNC: 173 MG/DL (ref 0–199)
VITAMIN D 25-HYDROXY: 39 NG/ML (ref 30–100)

## 2018-10-19 PROCEDURE — 36415 COLL VENOUS BLD VENIPUNCTURE: CPT

## 2018-10-19 PROCEDURE — 80061 LIPID PANEL: CPT

## 2018-10-19 PROCEDURE — 82306 VITAMIN D 25 HYDROXY: CPT

## 2018-10-19 ASSESSMENT — ENCOUNTER SYMPTOMS
SHORTNESS OF BREATH: 0
WHEEZING: 0
NAUSEA: 0
STRIDOR: 0
VOMITING: 0
CONSTIPATION: 0
COUGH: 0
BLOOD IN STOOL: 0
ABDOMINAL PAIN: 0
DIARRHEA: 0

## 2018-10-22 ENCOUNTER — TELEPHONE (OUTPATIENT)
Dept: FAMILY MEDICINE CLINIC | Age: 78
End: 2018-10-22

## 2018-10-22 DIAGNOSIS — E78.00 PURE HYPERCHOLESTEROLEMIA: ICD-10-CM

## 2018-10-22 RX ORDER — ROSUVASTATIN CALCIUM 10 MG/1
10 TABLET, COATED ORAL NIGHTLY
Qty: 90 TABLET | Refills: 3 | Status: SHIPPED | OUTPATIENT
Start: 2018-10-22 | End: 2019-11-21 | Stop reason: SDUPTHER

## 2019-01-14 DIAGNOSIS — E03.9 ACQUIRED HYPOTHYROIDISM: ICD-10-CM

## 2019-01-14 RX ORDER — LEVOTHYROXINE SODIUM 0.05 MG/1
TABLET ORAL
Qty: 90 TABLET | Refills: 2 | Status: SHIPPED | OUTPATIENT
Start: 2019-01-14 | End: 2019-11-21 | Stop reason: SDUPTHER

## 2019-01-17 DIAGNOSIS — G25.81 RESTLESS LEG SYNDROME: ICD-10-CM

## 2019-01-17 DIAGNOSIS — R25.2 CRAMP OF BOTH LOWER EXTREMITIES: ICD-10-CM

## 2019-01-17 RX ORDER — GABAPENTIN 100 MG/1
100 CAPSULE ORAL NIGHTLY
Qty: 90 CAPSULE | Refills: 0 | Status: SHIPPED | OUTPATIENT
Start: 2019-01-17 | End: 2019-04-22 | Stop reason: SDUPTHER

## 2019-06-16 DIAGNOSIS — G25.81 RESTLESS LEG SYNDROME: Primary | ICD-10-CM

## 2019-06-17 RX ORDER — PRAMIPEXOLE DIHYDROCHLORIDE 0.5 MG/1
TABLET ORAL
Qty: 180 TABLET | Refills: 3 | Status: SHIPPED | OUTPATIENT
Start: 2019-06-17 | End: 2020-06-11

## 2019-07-15 ENCOUNTER — OFFICE VISIT (OUTPATIENT)
Dept: NEUROLOGY | Age: 79
End: 2019-07-15
Payer: MEDICARE

## 2019-07-15 VITALS
DIASTOLIC BLOOD PRESSURE: 72 MMHG | HEIGHT: 60 IN | HEART RATE: 68 BPM | WEIGHT: 157 LBS | SYSTOLIC BLOOD PRESSURE: 128 MMHG | BODY MASS INDEX: 30.82 KG/M2

## 2019-07-15 DIAGNOSIS — G20 PARKINSON DISEASE (HCC): Primary | ICD-10-CM

## 2019-07-15 DIAGNOSIS — G25.81 RESTLESS LEG SYNDROME: ICD-10-CM

## 2019-07-15 PROCEDURE — 99213 OFFICE O/P EST LOW 20 MIN: CPT | Performed by: PSYCHIATRY & NEUROLOGY

## 2019-08-19 ENCOUNTER — OFFICE VISIT (OUTPATIENT)
Dept: FAMILY MEDICINE CLINIC | Age: 79
End: 2019-08-19
Payer: MEDICARE

## 2019-08-19 VITALS
BODY MASS INDEX: 32.47 KG/M2 | SYSTOLIC BLOOD PRESSURE: 134 MMHG | WEIGHT: 165.4 LBS | DIASTOLIC BLOOD PRESSURE: 68 MMHG | TEMPERATURE: 97.2 F | HEIGHT: 60 IN | RESPIRATION RATE: 16 BRPM | HEART RATE: 76 BPM

## 2019-08-19 DIAGNOSIS — R06.83 SNORING: ICD-10-CM

## 2019-08-19 DIAGNOSIS — R25.2 CRAMP OF BOTH LOWER EXTREMITIES: ICD-10-CM

## 2019-08-19 DIAGNOSIS — E78.00 PURE HYPERCHOLESTEROLEMIA: ICD-10-CM

## 2019-08-19 DIAGNOSIS — E55.9 VITAMIN D DEFICIENCY: ICD-10-CM

## 2019-08-19 DIAGNOSIS — Z78.0 POSTMENOPAUSAL: ICD-10-CM

## 2019-08-19 DIAGNOSIS — E03.9 ACQUIRED HYPOTHYROIDISM: ICD-10-CM

## 2019-08-19 DIAGNOSIS — M85.89 OSTEOPENIA OF MULTIPLE SITES: ICD-10-CM

## 2019-08-19 DIAGNOSIS — K76.0 HEPATIC STEATOSIS: ICD-10-CM

## 2019-08-19 DIAGNOSIS — G25.81 RESTLESS LEG SYNDROME: ICD-10-CM

## 2019-08-19 DIAGNOSIS — R53.82 CHRONIC FATIGUE: Primary | ICD-10-CM

## 2019-08-19 DIAGNOSIS — G20 PARKINSON DISEASE (HCC): ICD-10-CM

## 2019-08-19 DIAGNOSIS — F43.21 ADJUSTMENT DISORDER WITH DEPRESSED MOOD: ICD-10-CM

## 2019-08-19 PROCEDURE — 99214 OFFICE O/P EST MOD 30 MIN: CPT | Performed by: FAMILY MEDICINE

## 2019-08-19 RX ORDER — GABAPENTIN 100 MG/1
100 CAPSULE ORAL NIGHTLY
Qty: 90 CAPSULE | Refills: 1 | Status: SHIPPED | OUTPATIENT
Start: 2019-08-19 | End: 2019-09-23 | Stop reason: SDUPTHER

## 2019-08-19 RX ORDER — ESCITALOPRAM OXALATE 10 MG/1
10 TABLET ORAL DAILY
Qty: 30 TABLET | Refills: 3 | Status: SHIPPED | OUTPATIENT
Start: 2019-08-19 | End: 2019-11-21

## 2019-08-19 ASSESSMENT — PATIENT HEALTH QUESTIONNAIRE - PHQ9
SUM OF ALL RESPONSES TO PHQ QUESTIONS 1-9: 0
SUM OF ALL RESPONSES TO PHQ QUESTIONS 1-9: 0
2. FEELING DOWN, DEPRESSED OR HOPELESS: 0
SUM OF ALL RESPONSES TO PHQ9 QUESTIONS 1 & 2: 0
1. LITTLE INTEREST OR PLEASURE IN DOING THINGS: 0

## 2019-08-19 NOTE — PATIENT INSTRUCTIONS
ringing in the ears. · Some people get shoulder pain that can be more severe and longer-lasting than routine soreness that can follow injections. This happens very rarely. · Any medication can cause a severe allergic reaction. Such reactions to a vaccine are estimated at about 1 in a million doses, and would happen within a few minutes to a few hours after the vaccination. As with any medicine, there is a very remote chance of a vaccine causing a serious injury or death. The safety of vaccines is always being monitored. For more information, visit: www.cdc.gov/vaccinesafety/  What if there is a serious problem? What should I look for? · Look for anything that concerns you, such as signs of a severe allergic reaction, very high fever, or unusual behavior. Signs of a severe allergic reaction can include hives, swelling of the face and throat, difficulty breathing, a fast heartbeat, dizziness, and weakness. These would usually start a few minutes to a few hours after the vaccination. What should I do? · If you think it is a severe allergic reaction or other emergency that can't wait, call 9-1-1 or get to the nearest hospital. Otherwise, call your health care provider. · Afterward, the reaction should be reported to the Vaccine Adverse Event Reporting System (VAERS). Your doctor should file this report, or you can do it yourself through the VAERS website at www.vaers. hhs.gov, or by calling 2-943.168.4367. VAERS does not give medical advice. .  How can I learn more? · Ask your health care provider. He or she can give you the vaccine package insert or suggest other sources of information. · Call your local or state health department. · Contact the Centers for Disease Control and Prevention (CDC):  ? Call 5-668.461.6813 (1-800-CDC-INFO) or  ?  Visit CDC's website at www.cdc.gov/vaccines  Vaccine Information Statement (Interim)  Recombinant Zoster Vaccine  2/12/2018  Department of Health and Human become thinner and weaker. It is much more common in women than in men. It is an early form of osteoporosis, a condition in which the bones are so thin and weak that they can break easily. It's important to know that low bone density is not a disease. It can happen normally with aging. Having low bone density means that there is a greater risk that you may get osteoporosis. It also means that you are more likely to break a bone than someone who does not have low bone density. But not everyone with low bone density gets osteoporosis or breaks a bone. Low bone density doesn't cause any symptoms. It's usually found with a type of X-ray called a bone density test. Low bone density means that your bone density result (T-score) is between -1.0 and -2.5. What increases your risk for low bone density? Things that increase your risk include:  · Having a family history of osteoporosis. · Being thin. · Being white or . · Getting too little physical activity. · Smoking. · Drinking too much alcohol often. · Using certain medicines such as steroids. How can you prevent osteoporosis? There are things you can do to help prevent osteoporosis. Certain lifestyle changes will help slow the loss of bone density. · Eat food that has plenty of calcium and vitamin D. Yogurt, cheese, milk, and dark green vegetables are high in calcium. Eggs, fatty fish, cereal, and fortified milk are high in vitamin D.  · Talk to your doctor about taking a calcium supplement that has vitamin D in it. · Get regular exercise. ? Do 30 minutes of weight-bearing exercise on most days of the week. Walking, jogging, stair climbing, and dancing are good choices. ? Do resistance exercises with weights or elastic bands 2 or 3 days a week. · Limit alcohol to 2 drinks a day for men and 1 drink a day for women. Too much alcohol can cause health problems. · Do not smoke. Smoking can make bones thin faster.  If you need help quitting, talk to your

## 2019-08-19 NOTE — PROGRESS NOTES
Padmini Tovar is a 66 y.o. female who presents today for:   Chief Complaint   Patient presents with    Annual Exam    Fatigue     3 weeks    Nausea     mild         HPI:      HPI     Not sleeping well for several mos. R leg pain was waking her up   had stroke end of May and this has increased her stress which has also affected her sleep. 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 Increased fatigue every day especially x 3 weeks. Better while resting, but then starts feeling more tired once up just for a bit during the day. Usually only sleeping 5-6 hours per night because of   Does snore    Nausea after eating for many months. .  Maybe slight improvement with changing to different wheat bread        Hypothyroidism: Patient complains of hypothyroidism. Symptoms include change in energy level. Patient denies change in energy level. Onset of symptoms was several years ago. Symptoms have stabilized. RLS symptoms- during middle of night. Gabapentin helps    PD- follows with .  Stable. No increase in tremor/trouble with gait. Patient's medications, allergies, past medical, surgical, social,and family histories were reviewed and updated as appropriate. Outpatient Medications Prior to Visit   Medication Sig Dispense Refill    pramipexole (MIRAPEX) 0.5 MG tablet TAKE 1 TABLET TWICE A  tablet 3    levothyroxine (SYNTHROID) 50 MCG tablet TAKE 1 TABLET DAILY FOR LOW THYROID 90 tablet 2    rosuvastatin (CRESTOR) 10 MG tablet Take 1 tablet by mouth nightly 90 tablet 3    Calcium Carb-Cholecalciferol (CALCIUM 600+D) 600-800 MG-UNIT TABS Take 1 tablet by mouth daily      Coenzyme Q10 (CO Q 10) 100 MG CAPS Take by mouth daily      ACETAMINOPHEN PO Take 1,000 mg by mouth daily as needed (pain)       Probiotic Product (PROBIOTIC PO) Take 1 tablet by mouth daily Balance Propriety Probiotic Blend      NONFORMULARY Bone restore 4 tab per day.  1000 IU Vit D3  And 300 mg Magnesium      sounds. Exam reveals no gallop and no friction rub. No murmur heard. Pulmonary/Chest: Effort normal and breath sounds normal. No stridor. No respiratory distress. She has no wheezes. She has no rales. Abdominal: Soft. She exhibits no distension and no mass. There is no tenderness. There is no rebound and no guarding. Musculoskeletal: She exhibits no edema. Lymphadenopathy:     She has no cervical adenopathy. Neurological: She is alert and oriented to person, place, and time. Skin: Skin is warm and dry. No rash noted. She is not diaphoretic. Psychiatric: She has a normal mood and affect. Her behavior is normal. Judgment and thought content normal.   Nursing note and vitals reviewed. Assessment/Plan:      Diagnosis Orders   1. Chronic fatigue  External Referral To Sleep Medicine   2. Acquired hypothyroidism  TSH   3. Vitamin D deficiency  Vitamin D 25 Hydroxy   4. Pure hypercholesterolemia  Lipid, Fasting    Comprehensive Metabolic Panel    CBC Auto Differential   5. Parkinson disease ([address]  Comprehensive Metabolic Panel    CBC Auto Differential    TSH   6. Hepatic steatosis  Comprehensive Metabolic Panel   7. Osteopenia of multiple sites  DEXA BONE DENSITY 2 SITES   8. Postmenopausal  DEXA BONE DENSITY 2 SITES   9. Cramp of both lower extremities  gabapentin (NEURONTIN) 100 MG capsule   10. Restless leg syndrome  gabapentin (NEURONTIN) 100 MG capsule   11. Snoring  External Referral To Sleep Medicine   12. Adjustment disorder with depressed mood  escitalopram (LEXAPRO) 10 MG tablet     Check labs  Ref for sleep study    Due for DEXA    Stressed importance of trying to get longer hours of sleep and more regular sleep pattern. Suspect some of her fatigue is depression related due to stress of caring for her . Discussed ways of getting rest, asking for help, avoiding caregiver burnout. Will start Lexapro as well for her symptoms.     Increase gabapentin to 2 tabs for 1 week then let me know if helps/RLS symptoms, and I can send in updated Rx 200 mg capsule in future if needed      Patient given educational materials- see patient instructions. Discussed use, benefit, and side effects of prescribedmedications. All patient questions answered. Pt voiced understanding. Reviewedhealth maintenance. Discussed medications, diet and exercise. Patient agreed withtreatment plan. Follow up as directed. Controlled Substance Monitoring:    Acute and Chronic Pain Monitoring:         Return in about 3 months (around 11/19/2019) for fatigue, f/u mood.       (Please notethat portions of this note were completed with a voice recognition program. Effortswere made to edit the dictations but occasionally words are mis-transcribed.)

## 2019-08-20 ENCOUNTER — NURSE ONLY (OUTPATIENT)
Dept: LAB | Age: 79
End: 2019-08-20

## 2019-08-20 ENCOUNTER — TELEPHONE (OUTPATIENT)
Dept: FAMILY MEDICINE CLINIC | Age: 79
End: 2019-08-20

## 2019-08-20 DIAGNOSIS — E03.9 ACQUIRED HYPOTHYROIDISM: ICD-10-CM

## 2019-08-20 DIAGNOSIS — E55.9 VITAMIN D DEFICIENCY: ICD-10-CM

## 2019-08-20 DIAGNOSIS — E78.00 PURE HYPERCHOLESTEROLEMIA: ICD-10-CM

## 2019-08-20 DIAGNOSIS — G20 PARKINSON DISEASE (HCC): ICD-10-CM

## 2019-08-20 DIAGNOSIS — K76.0 HEPATIC STEATOSIS: ICD-10-CM

## 2019-08-20 DIAGNOSIS — E87.5 HYPERKALEMIA: Primary | ICD-10-CM

## 2019-08-20 LAB
ALBUMIN SERPL-MCNC: 4.4 G/DL (ref 3.5–5.1)
ALP BLD-CCNC: 67 U/L (ref 38–126)
ALT SERPL-CCNC: 19 U/L (ref 11–66)
ANION GAP SERPL CALCULATED.3IONS-SCNC: 10 MEQ/L (ref 8–16)
AST SERPL-CCNC: 24 U/L (ref 5–40)
BASOPHILS # BLD: 1.4 %
BASOPHILS ABSOLUTE: 0.1 THOU/MM3 (ref 0–0.1)
BILIRUB SERPL-MCNC: 0.6 MG/DL (ref 0.3–1.2)
BUN BLDV-MCNC: 14 MG/DL (ref 7–22)
CALCIUM SERPL-MCNC: 10.3 MG/DL (ref 8.5–10.5)
CHLORIDE BLD-SCNC: 105 MEQ/L (ref 98–111)
CHOLESTEROL, FASTING: 170 MG/DL (ref 100–199)
CO2: 29 MEQ/L (ref 23–33)
CREAT SERPL-MCNC: 0.7 MG/DL (ref 0.4–1.2)
EOSINOPHIL # BLD: 2.6 %
EOSINOPHILS ABSOLUTE: 0.1 THOU/MM3 (ref 0–0.4)
ERYTHROCYTE [DISTWIDTH] IN BLOOD BY AUTOMATED COUNT: 12.8 % (ref 11.5–14.5)
ERYTHROCYTE [DISTWIDTH] IN BLOOD BY AUTOMATED COUNT: 45 FL (ref 35–45)
GFR SERPL CREATININE-BSD FRML MDRD: 81 ML/MIN/1.73M2
GLUCOSE BLD-MCNC: 96 MG/DL (ref 70–108)
HCT VFR BLD CALC: 43.9 % (ref 37–47)
HDLC SERPL-MCNC: 52 MG/DL
HEMOGLOBIN: 13.9 GM/DL (ref 12–16)
IMMATURE GRANS (ABS): 0.01 THOU/MM3 (ref 0–0.07)
IMMATURE GRANULOCYTES: 0 %
LDL CHOLESTEROL CALCULATED: 78 MG/DL
LYMPHOCYTES # BLD: 39.3 %
LYMPHOCYTES ABSOLUTE: 1.7 THOU/MM3 (ref 1–4.8)
MCH RBC QN AUTO: 30.3 PG (ref 26–33)
MCHC RBC AUTO-ENTMCNC: 31.7 GM/DL (ref 32.2–35.5)
MCV RBC AUTO: 95.6 FL (ref 81–99)
MONOCYTES # BLD: 7.4 %
MONOCYTES ABSOLUTE: 0.3 THOU/MM3 (ref 0.4–1.3)
NUCLEATED RED BLOOD CELLS: 0 /100 WBC
PLATELET # BLD: 195 THOU/MM3 (ref 130–400)
PMV BLD AUTO: 10.7 FL (ref 9.4–12.4)
POTASSIUM SERPL-SCNC: 5.3 MEQ/L (ref 3.5–5.2)
RBC # BLD: 4.59 MILL/MM3 (ref 4.2–5.4)
SEG NEUTROPHILS: 49.1 %
SEGMENTED NEUTROPHILS ABSOLUTE COUNT: 2.1 THOU/MM3 (ref 1.8–7.7)
SODIUM BLD-SCNC: 144 MEQ/L (ref 135–145)
TOTAL PROTEIN: 7.2 G/DL (ref 6.1–8)
TRIGLYCERIDE, FASTING: 202 MG/DL (ref 0–199)
TSH SERPL DL<=0.05 MIU/L-ACNC: 3.51 UIU/ML (ref 0.4–4.2)
VITAMIN D 25-HYDROXY: 31 NG/ML (ref 30–100)
WBC # BLD: 4.2 THOU/MM3 (ref 4.8–10.8)

## 2019-08-21 ASSESSMENT — ENCOUNTER SYMPTOMS
DIARRHEA: 0
WHEEZING: 0
BLOOD IN STOOL: 0
STRIDOR: 0
NAUSEA: 0
CONSTIPATION: 0
COUGH: 0
ABDOMINAL PAIN: 0
SHORTNESS OF BREATH: 0
VOMITING: 0

## 2019-08-26 ENCOUNTER — NURSE ONLY (OUTPATIENT)
Dept: LAB | Age: 79
End: 2019-08-26

## 2019-08-26 DIAGNOSIS — E87.5 HYPERKALEMIA: ICD-10-CM

## 2019-08-26 LAB — POTASSIUM SERPL-SCNC: 4.1 MEQ/L (ref 3.5–5.2)

## 2019-09-23 ENCOUNTER — TELEPHONE (OUTPATIENT)
Dept: FAMILY MEDICINE CLINIC | Age: 79
End: 2019-09-23

## 2019-09-23 DIAGNOSIS — R25.2 CRAMP OF BOTH LOWER EXTREMITIES: ICD-10-CM

## 2019-09-23 DIAGNOSIS — G25.81 RESTLESS LEG SYNDROME: ICD-10-CM

## 2019-09-23 RX ORDER — GABAPENTIN 100 MG/1
200 CAPSULE ORAL NIGHTLY
Qty: 180 CAPSULE | Refills: 0 | Status: SHIPPED | OUTPATIENT
Start: 2019-09-23 | End: 2019-12-13 | Stop reason: ALTCHOICE

## 2019-10-07 ENCOUNTER — NURSE ONLY (OUTPATIENT)
Dept: FAMILY MEDICINE CLINIC | Age: 79
End: 2019-10-07
Payer: MEDICARE

## 2019-10-07 DIAGNOSIS — Z23 NEED FOR INFLUENZA VACCINATION: Primary | ICD-10-CM

## 2019-10-07 PROCEDURE — 90653 IIV ADJUVANT VACCINE IM: CPT | Performed by: FAMILY MEDICINE

## 2019-10-07 PROCEDURE — G0008 ADMIN INFLUENZA VIRUS VAC: HCPCS | Performed by: FAMILY MEDICINE

## 2019-11-21 ENCOUNTER — OFFICE VISIT (OUTPATIENT)
Dept: FAMILY MEDICINE CLINIC | Age: 79
End: 2019-11-21
Payer: MEDICARE

## 2019-11-21 VITALS
WEIGHT: 174 LBS | DIASTOLIC BLOOD PRESSURE: 82 MMHG | BODY MASS INDEX: 33.98 KG/M2 | HEART RATE: 80 BPM | RESPIRATION RATE: 10 BRPM | SYSTOLIC BLOOD PRESSURE: 128 MMHG

## 2019-11-21 DIAGNOSIS — E03.9 ACQUIRED HYPOTHYROIDISM: ICD-10-CM

## 2019-11-21 DIAGNOSIS — H61.23 BILATERAL IMPACTED CERUMEN: ICD-10-CM

## 2019-11-21 DIAGNOSIS — R53.82 CHRONIC FATIGUE: ICD-10-CM

## 2019-11-21 DIAGNOSIS — G25.81 RESTLESS LEG SYNDROME: Primary | ICD-10-CM

## 2019-11-21 DIAGNOSIS — E78.00 PURE HYPERCHOLESTEROLEMIA: ICD-10-CM

## 2019-11-21 PROCEDURE — 69210 REMOVE IMPACTED EAR WAX UNI: CPT | Performed by: FAMILY MEDICINE

## 2019-11-21 PROCEDURE — 99214 OFFICE O/P EST MOD 30 MIN: CPT | Performed by: FAMILY MEDICINE

## 2019-11-21 RX ORDER — LEVOTHYROXINE SODIUM 0.05 MG/1
TABLET ORAL
Qty: 90 TABLET | Refills: 3 | Status: SHIPPED | OUTPATIENT
Start: 2019-11-21 | End: 2020-11-18

## 2019-11-21 RX ORDER — GABAPENTIN 100 MG/1
200 CAPSULE ORAL NIGHTLY
Qty: 180 CAPSULE | Refills: 0 | Status: CANCELLED | OUTPATIENT
Start: 2019-12-16 | End: 2020-03-15

## 2019-11-21 RX ORDER — ROSUVASTATIN CALCIUM 10 MG/1
10 TABLET, COATED ORAL NIGHTLY
Qty: 90 TABLET | Refills: 3 | Status: SHIPPED | OUTPATIENT
Start: 2019-11-21 | End: 2020-11-18

## 2019-11-21 ASSESSMENT — ENCOUNTER SYMPTOMS
RHINORRHEA: 0
WHEEZING: 0
STRIDOR: 0
DIARRHEA: 0
BLOOD IN STOOL: 0
CONSTIPATION: 0
SHORTNESS OF BREATH: 0
NAUSEA: 0
SORE THROAT: 0
PHOTOPHOBIA: 0
BACK PAIN: 0
ABDOMINAL PAIN: 0
COUGH: 0
VOMITING: 0

## 2019-12-13 ENCOUNTER — OFFICE VISIT (OUTPATIENT)
Dept: FAMILY MEDICINE CLINIC | Age: 79
End: 2019-12-13
Payer: MEDICARE

## 2019-12-13 VITALS
OXYGEN SATURATION: 97 % | DIASTOLIC BLOOD PRESSURE: 72 MMHG | RESPIRATION RATE: 20 BRPM | TEMPERATURE: 97.3 F | HEIGHT: 60 IN | WEIGHT: 173.8 LBS | BODY MASS INDEX: 34.12 KG/M2 | SYSTOLIC BLOOD PRESSURE: 136 MMHG | HEART RATE: 71 BPM

## 2019-12-13 DIAGNOSIS — J01.00 ACUTE NON-RECURRENT MAXILLARY SINUSITIS: Primary | ICD-10-CM

## 2019-12-13 DIAGNOSIS — J40 BRONCHITIS: ICD-10-CM

## 2019-12-13 PROCEDURE — 99213 OFFICE O/P EST LOW 20 MIN: CPT | Performed by: NURSE PRACTITIONER

## 2019-12-13 RX ORDER — AZITHROMYCIN 250 MG/1
TABLET, FILM COATED ORAL
Qty: 1 PACKET | Refills: 0 | Status: SHIPPED | OUTPATIENT
Start: 2019-12-13 | End: 2019-12-23

## 2019-12-13 RX ORDER — DEXTROMETHORPHAN HYDROBROMIDE AND PROMETHAZINE HYDROCHLORIDE 15; 6.25 MG/5ML; MG/5ML
5 SYRUP ORAL 4 TIMES DAILY PRN
Qty: 240 ML | Refills: 0 | Status: SHIPPED | OUTPATIENT
Start: 2019-12-13 | End: 2019-12-20

## 2019-12-13 SDOH — ECONOMIC STABILITY: FOOD INSECURITY: WITHIN THE PAST 12 MONTHS, THE FOOD YOU BOUGHT JUST DIDN'T LAST AND YOU DIDN'T HAVE MONEY TO GET MORE.: NEVER TRUE

## 2019-12-13 SDOH — ECONOMIC STABILITY: INCOME INSECURITY: HOW HARD IS IT FOR YOU TO PAY FOR THE VERY BASICS LIKE FOOD, HOUSING, MEDICAL CARE, AND HEATING?: NOT HARD AT ALL

## 2019-12-13 SDOH — ECONOMIC STABILITY: FOOD INSECURITY: WITHIN THE PAST 12 MONTHS, YOU WORRIED THAT YOUR FOOD WOULD RUN OUT BEFORE YOU GOT MONEY TO BUY MORE.: NEVER TRUE

## 2019-12-13 ASSESSMENT — ENCOUNTER SYMPTOMS
COUGH: 1
EYES NEGATIVE: 1
GASTROINTESTINAL NEGATIVE: 1
SINUS PRESSURE: 1

## 2020-01-09 RX ORDER — METRONIDAZOLE 500 MG/1
500 TABLET ORAL EVERY 8 HOURS SCHEDULED
Qty: 21 TABLET | Refills: 0 | Status: SHIPPED | OUTPATIENT
Start: 2020-01-09 | End: 2020-01-16

## 2020-01-09 RX ORDER — CIPROFLOXACIN 500 MG/1
500 TABLET, FILM COATED ORAL 2 TIMES DAILY
Qty: 14 TABLET | Refills: 0 | Status: SHIPPED | OUTPATIENT
Start: 2020-01-09 | End: 2020-01-16

## 2020-02-03 ENCOUNTER — NURSE ONLY (OUTPATIENT)
Dept: LAB | Age: 80
End: 2020-02-03

## 2020-02-03 LAB
ALBUMIN SERPL-MCNC: 4.2 G/DL (ref 3.5–5.1)
ALP BLD-CCNC: 71 U/L (ref 38–126)
ALT SERPL-CCNC: 12 U/L (ref 11–66)
ANION GAP SERPL CALCULATED.3IONS-SCNC: 10 MEQ/L (ref 8–16)
AST SERPL-CCNC: 18 U/L (ref 5–40)
BASOPHILS # BLD: 1.5 %
BASOPHILS ABSOLUTE: 0.1 THOU/MM3 (ref 0–0.1)
BILIRUB SERPL-MCNC: 0.6 MG/DL (ref 0.3–1.2)
BUN BLDV-MCNC: 14 MG/DL (ref 7–22)
C-REACTIVE PROTEIN: 0.83 MG/DL (ref 0–1)
CALCIUM SERPL-MCNC: 9.3 MG/DL (ref 8.5–10.5)
CHLORIDE BLD-SCNC: 106 MEQ/L (ref 98–111)
CO2: 27 MEQ/L (ref 23–33)
CREAT SERPL-MCNC: 0.7 MG/DL (ref 0.4–1.2)
EOSINOPHIL # BLD: 2.8 %
EOSINOPHILS ABSOLUTE: 0.1 THOU/MM3 (ref 0–0.4)
ERYTHROCYTE [DISTWIDTH] IN BLOOD BY AUTOMATED COUNT: 12.9 % (ref 11.5–14.5)
ERYTHROCYTE [DISTWIDTH] IN BLOOD BY AUTOMATED COUNT: 44.3 FL (ref 35–45)
GFR SERPL CREATININE-BSD FRML MDRD: 81 ML/MIN/1.73M2
GLUCOSE BLD-MCNC: 97 MG/DL (ref 70–108)
HCT VFR BLD CALC: 43.3 % (ref 37–47)
HEMOGLOBIN: 13.6 GM/DL (ref 12–16)
IMMATURE GRANS (ABS): 0.01 THOU/MM3 (ref 0–0.07)
IMMATURE GRANULOCYTES: 0.3 %
LYMPHOCYTES # BLD: 42.5 %
LYMPHOCYTES ABSOLUTE: 1.7 THOU/MM3 (ref 1–4.8)
MCH RBC QN AUTO: 29.6 PG (ref 26–33)
MCHC RBC AUTO-ENTMCNC: 31.4 GM/DL (ref 32.2–35.5)
MCV RBC AUTO: 94.3 FL (ref 81–99)
MONOCYTES # BLD: 6.9 %
MONOCYTES ABSOLUTE: 0.3 THOU/MM3 (ref 0.4–1.3)
NUCLEATED RED BLOOD CELLS: 0 /100 WBC
PLATELET # BLD: 205 THOU/MM3 (ref 130–400)
PMV BLD AUTO: 10.5 FL (ref 9.4–12.4)
POTASSIUM SERPL-SCNC: 4.6 MEQ/L (ref 3.5–5.2)
RBC # BLD: 4.59 MILL/MM3 (ref 4.2–5.4)
SEG NEUTROPHILS: 46 %
SEGMENTED NEUTROPHILS ABSOLUTE COUNT: 1.8 THOU/MM3 (ref 1.8–7.7)
SODIUM BLD-SCNC: 143 MEQ/L (ref 135–145)
TOTAL PROTEIN: 7.2 G/DL (ref 6.1–8)
WBC # BLD: 3.9 THOU/MM3 (ref 4.8–10.8)

## 2020-02-04 LAB — IGA: 141 MG/DL (ref 70–400)

## 2020-02-05 LAB
TISSUE TRANSGLUTAMINASE ANTIBODY: 5 U/ML (ref 0–5)
TISSUE TRANSGLUTAMINASE IGA: 0 U/ML (ref 0–3)

## 2020-02-07 ENCOUNTER — HOSPITAL ENCOUNTER (OUTPATIENT)
Dept: ULTRASOUND IMAGING | Age: 80
Discharge: HOME | End: 2020-02-07
Payer: MEDICARE

## 2020-02-07 PROCEDURE — 93975 VASCULAR STUDY: CPT

## 2020-02-07 PROCEDURE — 76705 ECHO EXAM OF ABDOMEN: CPT

## 2020-04-13 RX ORDER — OMEPRAZOLE 40 MG/1
CAPSULE, DELAYED RELEASE ORAL
COMMUNITY
Start: 2020-03-02 | End: 2020-07-27 | Stop reason: ALTCHOICE

## 2020-04-13 ASSESSMENT — PATIENT HEALTH QUESTIONNAIRE - PHQ9
SUM OF ALL RESPONSES TO PHQ QUESTIONS 1-9: 0
SUM OF ALL RESPONSES TO PHQ QUESTIONS 1-9: 0

## 2020-04-13 ASSESSMENT — LIFESTYLE VARIABLES: HOW OFTEN DO YOU HAVE A DRINK CONTAINING ALCOHOL: 0

## 2020-04-14 ENCOUNTER — VIRTUAL VISIT (OUTPATIENT)
Dept: FAMILY MEDICINE CLINIC | Age: 80
End: 2020-04-14
Payer: MEDICARE

## 2020-04-14 PROCEDURE — G0438 PPPS, INITIAL VISIT: HCPCS | Performed by: FAMILY MEDICINE

## 2020-04-14 NOTE — PROGRESS NOTES
Medicare Annual Wellness Visit  Name: Dionte Reed Date: 4/15/2020   MRN: [id number] Sex: Female   Age: 78 y.o. Ethnicity: Non-/Non    : 1940 Race: Danielle Love is here for Medicare AWV    Screenings for behavioral, psychosocial and functional/safety risks, and cognitive dysfunction are all negative except as indicated below. These results, as well as other patient data from the [address] form, are documented in Flowsheets linked to this Encounter. Had large polyp that was removed few mos ago. Precancerous and recommended to have R hemicolectomy. Is being postponed d/t COVID but hopefully in next couple mos. Felt nauseous and some abd pain which prompted GI eval.  Still with some of these same symptoms. trying to adjust diet, as well. Allergies   Allergen Reactions    Penicillins     Lipitor [Atorvastatin] Other (See Comments)     Muscle aches         Prior to Visit Medications    Medication Sig Taking?  Authorizing Provider   NONFORMULARY Respire Pink Micro Sleep Appliance Yes Historical Provider, MD   rosuvastatin (CRESTOR) 10 MG tablet Take 1 tablet by mouth nightly Yes Stephanie Seen, MD   levothyroxine (SYNTHROID) 50 MCG tablet TAKE 1 TABLET DAILY FOR LOW THYROID Yes Stephanie Juárez MD   pramipexole (MIRAPEX) 0.5 MG tablet TAKE 1 TABLET TWICE A DAY Yes Saadia Grant MD   ACETAMINOPHEN PO Take 1,000 mg by mouth daily as needed (pain)  Yes Historical Provider, MD   omeprazole (PRILOSEC) 40 MG delayed release capsule TAKE 1 CAPSULE BY MOUTH ONCE DAILY  Historical Provider, MD   pravastatin (PRAVACHOL) 40 MG tablet Take 1 tablet by mouth every evening  Stephanie Juárez MD         Past Medical History:   Diagnosis Date    Diverticulosis     Fatty liver     GERD (gastroesophageal reflux disease)     was on prilosec in past    Hiatal hernia     Hx of blood clots     Hyperlipidemia     Hypertension     Hypothyroidism     Incontinence     urinary    Osteoarthritis     Stress fracture     left distal radius    Tubular adenoma     colononoscopy 2010- Dr. Francisco Sousa; large sigmoid polyp colonoscopy 2016       Past Surgical History:   Procedure Laterality Date    BLADDER SUSPENSION      CATARACT REMOVAL  05 23 2015    CHOLECYSTECTOMY      COLONOSCOPY  2010, june 2016    Dr. Francisco Sousa- polyps removed, Dr. Candelario Libman Right 08/23/2016    Plate inserted into rt. leg    [facility]CTOMY      [facility] Dr. Tanika Craig release         Family History   Problem Relation Age of Onset    Parkinsonism Sister     Colon Cancer Brother     Lung Cancer Brother     Heart Disease Paternal Grandmother         >58 yo       CareTeam (Including outside providers/suppliers regularly involved in providing care):   Patient Care Team:  Ny Delaney MD as PCP - General (Family Medicine)  Ny Delaney MD as PCP - [facility] Empaneled Provider    Wt Readings from Last 3 Encounters:   12/13/19 173 lb 12.8 oz (78.8 kg)   11/21/19 174 lb (78.9 kg)   08/19/19 165 lb 6.4 oz (75 kg)     There were no vitals filed for this visit. There is no height or weight on file to calculate BMI. Based upon direct observation of the patient, evaluation of cognition reveals recent and remote memory intact.     Allergies   Allergen Reactions    Penicillins     Lipitor [Atorvastatin] Other (See Comments)     Muscle aches   ,   Past Medical History:   Diagnosis Date    Diverticulosis     Fatty liver     GERD (gastroesophageal reflux disease)     was on prilosec in past    Hiatal hernia     Hx of blood clots     Hyperlipidemia     Hypertension     Hypothyroidism     Incontinence     urinary    Osteoarthritis     Stress fracture     left distal radius    Tubular adenoma     colononoscopy 2010- Dr. Francisco Sousa; large sigmoid polyp colonoscopy 2016   ,   Past Surgical no signs of ataxia         [] Normal range of motion of neck        [] Abnormal-       Neurological:        [x] No Facial Asymmetry (Cranial nerve 7 motor function) (limited exam to video visit)          [] No gaze palsy        [] Abnormal-         Skin:        [x] No significant exanthematous lesions or discoloration noted on facial skin         [] Abnormal-            Psychiatric:       [x] Normal Affect [x] No Hallucinations        [] Abnormal-     Other pertinent observable physical exam findings-     3 word recall - 3/3  Clock draw - normal    Get up and Go test - normal, steady , <20 sec      Patient's complete Health Risk Assessment and screening values have been reviewed and are found in Flowsheets. The following problems were reviewed today and where indicated follow up appointments were made and/or referrals ordered. Positive Risk Factor Screenings with Interventions:     Health Habits/Nutrition:  Health Habits/Nutrition  Do you exercise for at least 20 minutes 2-3 times per week?: Yes  Have you lost any weight without trying in the past 3 months?: No  Do you eat fewer than 2 meals per day?: No  Have you seen a dentist within the past year?: (!) No(CANCELLED DUE TO COVID )  There is no height or weight on file to calculate BMI.   Health Habits/Nutrition Interventions:  · Dental exam overdue:  patient encouraged to make appointment with his/her dentist    Hearing/Vision:  No exam data present  Hearing/Vision  Do you or your family notice any trouble with your hearing?: (!) Yes  Do you have difficulty driving, watching TV, or doing any of your daily activities because of your eyesight?: No  Have you had an eye exam within the past year?: (!) No(COVID)  Hearing/Vision Interventions:  · Hearing concerns:  will consider hearing eval in future  · Vision concerns:  patient encouraged to make appointment with his/her eye specialist    Safety:  Safety  Do you have working smoke detectors?: (!) No  Have all throw at a health care facility    Status post colonoscopy with polypectomy        Advised to let GI know about persisting symptoms    Discussed scheduling appt for likely preop around June prior to future likely ppartialcolectomy    The location of the patient is patient's home  The location of the provider is provider's home. Pursuant to the emergency declaration under the [address] [place] [address] waiver authority and the [employer] Act, this Virtual  Visit was conducted, with patient's consent, to reduce the patient's risk of exposure to COVID-19 and provide continuity of care for an established patient. Services were provided through a video synchronous discussion virtually to substitute for in-person clinic visit.

## 2020-04-14 NOTE — PATIENT INSTRUCTIONS
Personalized Preventive Plan for Jacqueline Hall - 4/14/2020  Medicare offers a range of preventive health benefits. Some of the tests and screenings are paid in full while other may be subject to a deductible, co-insurance, and/or copay. Some of these benefits include a comprehensive review of your medical history including lifestyle, illnesses that may run in your family, and various assessments and screenings as appropriate. After reviewing your medical record and screening and assessments performed today your provider may have ordered immunizations, labs, imaging, and/or referrals for you. A list of these orders (if applicable) as well as your Preventive Care list are included within your After Visit Summary for your review. Other Preventive Recommendations:    · A preventive eye exam performed by an eye specialist is recommended every 1-2 years to screen for glaucoma; cataracts, macular degeneration, and other eye disorders. · A preventive dental visit is recommended every 6 months. · Try to get at least 150 minutes of exercise per week or 10,000 steps per day on a pedometer . · Order or download the FREE \"Exercise & Physical Activity: Your Everyday Guide\" from [employer]. Call 7-137.253.8760 or search [employer] online. · You need 0376-0556 mg of calcium and 2236-5067 IU of vitamin D per day. It is possible to meet your calcium requirement with diet alone, but a vitamin D supplement is usually necessary to meet this goal.  · When exposed to the sun, use a sunscreen that protects against both UVA and UVB radiation with an SPF of 30 or greater. Reapply every 2 to 3 hours or after sweating, drying off with a towel, or swimming. · Always wear a seat belt when traveling in a car. Always wear a helmet when riding a bicycle or motorcycle. Heart-Healthy Diet   Sodium, Fat, and Cholesterol Controlled Diet       What Is a Heart Healthy Diet?    A heart-healthy diet is one that limits sodium , certain types of fat , and cholesterol . This type of diet is recommended for:   People with any form of cardiovascular disease (eg, coronary heart disease , peripheral vascular disease , previous heart attack , previous stroke )   People with risk factors for cardiovascular disease, such as high blood pressure , high cholesterol , or diabetes   Anyone who wants to lower their risk of developing cardiovascular disease   Sodium    Sodium is a mineral found in many foods. In general, most people consume much more sodium than they need. Diets high in sodium can increase blood pressure and lead to edema (water retention). On a heart-healthy diet, you should consume no more than 2,300 mg (milligrams) of sodium per dayabout the amount in one teaspoon of table salt. The foods highest in sodium include table salt (about 50% sodium), processed foods, convenience foods, and preserved foods. Cholesterol    Cholesterol is a fat-like, waxy substance in your blood. Our bodies make some cholesterol. It is also found in animal products, with the highest amounts in fatty meat, egg yolks, whole milk, cheese, shellfish, and organ meats. On a heart-healthy diet, you should limit your cholesterol intake to less than 200 mg per day. It is normal and important to have some cholesterol in your bloodstream. But too much cholesterol can cause plaque to build up within your arteries, which can eventually lead to a heart attack or stroke. The two types of cholesterol that are most commonly referred to are:   Low-density lipoprotein (LDL) cholesterol  Also known as bad cholesterol, this is the cholesterol that tends to build up along your arteries. Bad cholesterol levels are increased by eating fats that are saturated or hydrogenated. Optimal level of this cholesterol is less than 100. Over 130 starts to get risky for heart disease.    High-density lipoprotein (HDL) cholesterol  Also known as good example, this would mean 60 grams of fat or less per day. Saturated fat and trans fat in your diet raises your blood cholesterol the most, much more than dietary cholesterol does. For this reason, on a heart-healthy diet, less than 7% of your calories should come from saturated fat and ideally 0% from trans fat. On an 1800-calorie diet, this translates into less than 14 grams of saturated fat per day, leaving 46 grams of fat to come from mono- and polyunsaturated fats.    Food Choices on a Heart Healthy Diet   Food Category   Foods Recommended   Foods to Avoid   Grains   Breads and rolls without salted tops Most dry and cooked cereals Unsalted crackers and breadsticks Low-sodium or homemade breadcrumbs or stuffing All rice and pastas   Breads, rolls, and crackers with salted tops High-fat baked goods (eg, muffins, donuts, pastries) Quick breads, self-rising flour, and biscuit mixes Regular bread crumbs Instant hot cereals Commercially prepared rice, pasta, or stuffing mixes   Vegetables   Most fresh, frozen, and low-sodium canned vegetables Low-sodium and salt-free vegetable juices Canned vegetables if unsalted or rinsed   Regular canned vegetables and juices, including sauerkraut and pickled vegetables Frozen vegetables with sauces Commercially prepared potato and vegetable mixes   Fruits   Most fresh, frozen, and canned fruits All fruit juices   Fruits processed with salt or sodium   Milk   Nonfat or low-fat (1%) milk Nonfat or low-fat yogurt Cottage cheese, low-fat ricotta, cheeses labeled as low-fat and low-sodium   Whole milk Reduced-fat (2%) milk Malted and chocolate milk Full fat yogurt Most cheeses (unless low-fat and low salt) Buttermilk (no more than 1 cup per week)   Meats and Beans   Lean cuts of fresh or frozen beef, veal, lamb, or pork (look for the word loin) Fresh or frozen poultry without the skin Fresh or frozen fish and some shellfish Egg whites and egg substitutes (Limit whole eggs to three per and cholesterol amounts. For products low in sodium, look for sodium free, very low sodium, low sodium, no added salt, and unsalted   Skip the salt when cooking or at the table; if food needs more flavor, get creative and try out different herbs and spices. Garlic and onion also add substantial flavor to foods. Trim any visible fat off meat and poultry before cooking, and drain the fat off after merritt. Use cooking methods that require little or no added fat, such as grilling, boiling, baking, poaching, broiling, roasting, steaming, stir-frying, and sauting. Avoid fast food and convenience food. They tend to be high in saturated and trans fat and have a lot of added salt. Talk to a registered dietitian for individualized diet advice. Last Reviewed: March 2011 Yana Cook MS, MPH, RD   Updated: 3/29/2011   ·     Heart-Healthy Diet   Sodium, Fat, and Cholesterol Controlled Diet       What Is a Heart Healthy Diet? A heart-healthy diet is one that limits sodium , certain types of fat , and cholesterol . This type of diet is recommended for:   People with any form of cardiovascular disease (eg, coronary heart disease , peripheral vascular disease , previous heart attack , previous stroke )   People with risk factors for cardiovascular disease, such as high blood pressure , high cholesterol , or diabetes   Anyone who wants to lower their risk of developing cardiovascular disease   Sodium    Sodium is a mineral found in many foods. In general, most people consume much more sodium than they need. Diets high in sodium can increase blood pressure and lead to edema (water retention). On a heart-healthy diet, you should consume no more than 2,300 mg (milligrams) of sodium per dayabout the amount in one teaspoon of table salt. The foods highest in sodium include table salt (about 50% sodium), processed foods, convenience foods, and preserved foods.    Cholesterol    Cholesterol is a fat-like, waxy Salted snack foods Canned olives Meat tenderizers, seasoning salt, and most flavored vinegars   Beverages   Low-sodium carbonated beverages Tea and coffee in moderation Soy milk   Commercially softened water   Suggestions   Make whole grains, fruits, and vegetables the base of your diet. Choose heart-healthy fats such as canola, olive, and flaxseed oil, and foods high in heart-healthy fats, such as nuts, seeds, soybeans, tofu, and fish. Eat fish at least twice per week; the fish highest in omega-3 fatty acids and lowest in mercury include salmon, herring, mackerel, sardines, and canned chunk light tuna. If you eat fish less than twice per week or have high triglycerides, talk to your doctor about taking fish oil supplements. Read food labels. For products low in fat and cholesterol, look for fat free, low-fat, cholesterol free, saturated fat free, and trans fat freeAlso scan the Nutrition Facts Label, which lists saturated fat, trans fat, and cholesterol amounts. For products low in sodium, look for sodium free, very low sodium, low sodium, no added salt, and unsalted   Skip the salt when cooking or at the table; if food needs more flavor, get creative and try out different herbs and spices. Garlic and onion also add substantial flavor to foods. Trim any visible fat off meat and poultry before cooking, and drain the fat off after merritt. Use cooking methods that require little or no added fat, such as grilling, boiling, baking, poaching, broiling, roasting, steaming, stir-frying, and sauting. Avoid fast food and convenience food. They tend to be high in saturated and trans fat and have a lot of added salt. Talk to a registered dietitian for individualized diet advice.       Last Reviewed: March 2011 Eitan Farmer MS, MPH, RD   Updated: 3/29/2011   ·     Keep Your Memory [place]       Many factors can affect your ability to remembera hectic lifestyle, aging, stress, chronic disease, and certain met? Can herbs and supplements still offer a benefit? Researchers have investigated a range of natural remedies, such as ginkgo , ginseng , and the supplement phosphatidylserine (PS). So far, though, the evidence is inconsistent as to whether these products can improve memory or thinking. If you are interested in taking herbs and supplements, talk to your doctor first because they may interact with other medicines that you are taking. Exercise Regularly    Among the many benefits of regular exercise are increased blood flow to the brain and decreased risk of certain diseases that can interfere with memory function. One study found that even moderate exercise has a beneficial effect. Examples of \"moderate\" exercise include:   Playing 18 holes of golf once a week, without a cart   Playing tennis twice a week   Walking one mile per day   Manage Stress    It can be tough to remember what is important when your mind is cluttered. Make time for relaxation. Choose activities that calm you down, and make it routine. Manage Chronic Conditions    Side effects of high blood pressure , diabetes, and heart disease can interfere with mental function. Many of the lifestyle steps discussed here can help manage these conditions. Strive to eat a healthy diet, exercise regularly, get stress under control, and follow your doctor's advice for your condition. Minimize Medications    Talk to your doctor about the medicines that you take. Some may be unnecessary. Also, healthy lifestyle habits may lower the need for certain drugs. Last Reviewed: April 2010 Saida Crocker MD   Updated: 4/13/2010   ·        Advance Care Planning: Care Instructions  Your Care Instructions    It can be hard to live with an illness that cannot be cured. But if your health is getting worse, you may want to make decisions about end-of-life care. Planning for the end of your life does not mean that you are giving up.  It is a way to make sure that your wishes are met. Clearly stating your wishes can make it easier for your loved ones. Making plans while you are still able may also ease your mind and make your final days less stressful and more meaningful. Follow-up care is a key part of your treatment and safety. Be sure to make and go to all appointments, and call your doctor if you are having problems. It's also a good idea to know your test results and keep a list of the medicines you take. What can you do to plan for the end of life? You can bring these issues up with your doctor. You do not need to wait until your doctor starts the conversation. You might start with \"I would not be willing to live with . Phoebe Finders Phoebe Finders Phoebe Finders \" When you complete this sentence it helps your doctor understand your wishes. Talk openly and honestly with your doctor. This is the best way to understand the decisions you will need to make as your health changes. Know that you can always change your mind. Ask your doctor about commonly used life-support measures. These include tube feedings, breathing machines, and fluids given through a vein (IV). Understanding these treatments will help you decide whether you want them. You may choose to have these life-supporting treatments for a limited time. This allows a trial period to see whether they will help you. You may also decide that you want your doctor to take only certain measures to keep you alive. It is important to spell out these conditions so that your doctor and family understand them. Talk to your doctor about how long you are likely to live. He or she may be able to give you an idea of what usually happens with your specific illness. Think about preparing papers that state your wishes. This way there will not be any confusion about what you want. You can change your instructions at any time. Which papers should you prepare? Advance directives are legal papers that tell doctors how you want to be cared for at the end of your life.  You

## 2020-05-07 ENCOUNTER — HOSPITAL ENCOUNTER (OUTPATIENT)
Age: 80
Discharge: HOME | End: 2020-05-07
Payer: MEDICARE

## 2020-05-07 ENCOUNTER — TELEPHONE (OUTPATIENT)
Dept: PRIMARY CARE CLINIC | Age: 80
End: 2020-05-07

## 2020-05-07 LAB
ANION GAP SERPL CALCULATED.3IONS-SCNC: 9 MEQ/L (ref 8–16)
BUN BLDV-MCNC: 13 MG/DL (ref 7–22)
CALCIUM SERPL-MCNC: 9.5 MG/DL (ref 8.5–10.5)
CHLORIDE BLD-SCNC: 107 MEQ/L (ref 98–111)
CO2: 26 MEQ/L (ref 23–33)
CREAT SERPL-MCNC: 0.7 MG/DL (ref 0.4–1.2)
GFR SERPL CREATININE-BSD FRML MDRD: 81 ML/MIN/1.73M2
GLUCOSE BLD-MCNC: 115 MG/DL (ref 70–108)
MAGNESIUM: 2.1 MG/DL (ref 1.6–2.4)
POTASSIUM SERPL-SCNC: 4.1 MEQ/L (ref 3.5–5.2)
SODIUM BLD-SCNC: 142 MEQ/L (ref 135–145)
VITAMIN D 25-HYDROXY: 26 NG/ML (ref 30–100)

## 2020-05-07 PROCEDURE — 36415 COLL VENOUS BLD VENIPUNCTURE: CPT

## 2020-05-07 PROCEDURE — 82306 VITAMIN D 25 HYDROXY: CPT

## 2020-05-07 PROCEDURE — 83735 ASSAY OF MAGNESIUM: CPT

## 2020-05-07 PROCEDURE — 80048 BASIC METABOLIC PNL TOTAL CA: CPT

## 2020-05-08 ENCOUNTER — OFFICE VISIT (OUTPATIENT)
Dept: SURGERY | Age: 80
End: 2020-05-08
Payer: MEDICARE

## 2020-05-08 VITALS
BODY MASS INDEX: 37.76 KG/M2 | DIASTOLIC BLOOD PRESSURE: 72 MMHG | WEIGHT: 175 LBS | HEIGHT: 57 IN | TEMPERATURE: 97.3 F | SYSTOLIC BLOOD PRESSURE: 138 MMHG | OXYGEN SATURATION: 98 % | RESPIRATION RATE: 20 BRPM | HEART RATE: 78 BPM

## 2020-05-08 PROCEDURE — 99204 OFFICE O/P NEW MOD 45 MIN: CPT | Performed by: SURGERY

## 2020-05-08 RX ORDER — METRONIDAZOLE 500 MG/1
TABLET ORAL
Qty: 3 TABLET | Refills: 0 | Status: ON HOLD | OUTPATIENT
Start: 2020-05-08 | End: 2020-06-09 | Stop reason: HOSPADM

## 2020-05-08 RX ORDER — ALVIMOPAN 12 MG/1
12 CAPSULE ORAL 2 TIMES DAILY
Status: CANCELLED | OUTPATIENT
Start: 2020-05-08 | End: 2020-05-15

## 2020-05-08 RX ORDER — ONDANSETRON 2 MG/ML
4 INJECTION INTRAMUSCULAR; INTRAVENOUS EVERY 4 HOURS PRN
Status: CANCELLED | OUTPATIENT
Start: 2020-05-08

## 2020-05-08 ASSESSMENT — ENCOUNTER SYMPTOMS
BLOOD IN STOOL: 0
RECTAL PAIN: 0
ANAL BLEEDING: 0
EYE ITCHING: 0
SHORTNESS OF BREATH: 0
ABDOMINAL DISTENTION: 1
VOMITING: 0
EYE DISCHARGE: 0
CHOKING: 0
APNEA: 0
COLOR CHANGE: 0
EYE PAIN: 0
WHEEZING: 0
SORE THROAT: 0
RHINORRHEA: 0
CONSTIPATION: 0
COUGH: 0
FACIAL SWELLING: 0
VOICE CHANGE: 0
CHEST TIGHTNESS: 0
ABDOMINAL PAIN: 1
EYE REDNESS: 0
SINUS PRESSURE: 0
NAUSEA: 0
STRIDOR: 0
BACK PAIN: 0
DIARRHEA: 0
TROUBLE SWALLOWING: 0
PHOTOPHOBIA: 0
ALLERGIC/IMMUNOLOGIC NEGATIVE: 1

## 2020-05-08 NOTE — PROGRESS NOTES
Subjective:      Patient ID: Campos Mclean is a 78 y.o. female. Chief Complaint   Patient presents with    Surgical Consult     new patient--ref by Dr. Roro Reyes for large tubular adenoma-needs surgical resection     SEUN Pina is a 19-year-old female who I was asked to see in consultation secondary to an unresectable a sending colon polyp/mass. Biopsy demonstrated tubular adenoma. Dr. Roro Reyes is concern for developing malignancy. Patient admits to right-sided abdominal discomfort. Mild bloating/distention. No generalized abdominal pain. Normal bowel function. No hematochezia or melena. No urinary complaints. Tolerating diet. No unexplained weight loss. No fever, chills or sweats. Patient admits to previous cholecystectomy and appendectomy. Denies any significant family history in regards to colorectal disease that she is aware of. She states she would like to proceed with resection if at all possible. Review of Systems   Constitutional: Negative for activity change, appetite change, chills, diaphoresis, fatigue, fever and unexpected weight change. HENT: Negative for congestion, dental problem, drooling, ear discharge, ear pain, facial swelling, hearing loss, mouth sores, nosebleeds, postnasal drip, rhinorrhea, sinus pressure, sneezing, sore throat, tinnitus, trouble swallowing and voice change. Eyes: Negative for photophobia, pain, discharge, redness, itching and visual disturbance. Respiratory: Negative for apnea, cough, choking, chest tightness, shortness of breath, wheezing and stridor. Cardiovascular: Negative for chest pain, palpitations and leg swelling. Gastrointestinal: Positive for abdominal distention and abdominal pain. Negative for anal bleeding, blood in stool, constipation, diarrhea, nausea, rectal pain and vomiting. Endocrine: Negative.     Genitourinary: Negative for decreased urine volume, difficulty urinating, dyspareunia, dysuria, enuresis, flank pain, frequency, genital sores, hematuria, menstrual problem, pelvic pain, urgency, vaginal bleeding, vaginal discharge and vaginal pain. Musculoskeletal: Negative for arthralgias, back pain, gait problem, joint swelling, myalgias, neck pain and neck stiffness. Skin: Negative for color change, pallor, rash and wound. Allergic/Immunologic: Negative. Neurological: Negative for dizziness, tremors, seizures, syncope, facial asymmetry, speech difficulty, weakness, light-headedness, numbness and headaches. Hematological: Negative for adenopathy. Does not bruise/bleed easily. Psychiatric/Behavioral: Negative for agitation, behavioral problems, confusion, decreased concentration, dysphoric mood, hallucinations, self-injury, sleep disturbance and suicidal ideas. The patient is not nervous/anxious and is not hyperactive.         Past Medical History:   Diagnosis Date    Diverticulosis     Fatty liver     GERD (gastroesophageal reflux disease)     was on prilosec in past    Hiatal hernia     Hx of blood clots     Hyperlipidemia     Hypertension     Hypothyroidism     Incontinence     urinary    Osteoarthritis     Parkinson's disease ([facility] [address]     Stress fracture     left distal radius    Tubular adenoma     colononoscopy 2010- Dr. Zaria Jones; large sigmoid polyp colonoscopy 2016       Past Surgical History:   Procedure Laterality Date    BLADDER SUSPENSION      CATARACT REMOVAL  05 23 2015   [facility] COLONOSCOPY  2010, june 2016    Dr. Zaria Jones- polyps removed, Dr. Emily Tolbert  03/2020    Dr. Ashlee Bonilla Right 08/23/2016    Plate inserted into rt. leg    [facility]Y      [facility] Dr. Janette Sparks  03/2020    Dr. Samira Carreon release       Current Outpatient Medications   Medication Sig Dispense Refill    omeprazole (PRILOSEC) 40 MG delayed release capsule TAKE 1 CAPSULE BY MOUTH ONCE DAILY      NONFORMULARY Respire Pink Micro Sleep Appliance      rosuvastatin (CRESTOR) 10 MG tablet Take 1 tablet by mouth nightly 90 tablet 3    levothyroxine (SYNTHROID) 50 MCG tablet TAKE 1 TABLET DAILY FOR LOW THYROID 90 tablet 3    pramipexole (MIRAPEX) 0.5 MG tablet TAKE 1 TABLET TWICE A  tablet 3    ACETAMINOPHEN PO Take 1,000 mg by mouth daily as needed (pain)        No current facility-administered medications for this visit.         Allergies   Allergen Reactions    Penicillins      Unsure of reaction    Lipitor [Atorvastatin] Other (See Comments)     Muscle aches       Family History   Problem Relation Age of Onset    Parkinsonism Sister     Cancer Father     Colon Cancer Brother     Lung Cancer Brother     Heart Disease Paternal Grandmother         >56 yo       Social History     Socioeconomic History    Marital status:      Spouse name: Guy Funes Number of children: 2    Years of education: 15    Highest education level: Not on file   Occupational History    Occupation: retired   Social Needs    Financial resource strain: Not hard at all   [employer] insecurity     Worry: Never true     Inability: Never true   [place] needs     Medical: Not on file     Non-medical: Not on file   Tobacco Use    Smoking status: Former Smoker     Packs/day: 0.25     Years: 1.00     Pack years: 0.25     Types: Cigarettes     Last attempt to quit: 1959     Years since quittin.8    Smokeless tobacco: Never Used   Substance and Sexual Activity    Alcohol use: No     Alcohol/week: 0.0 standard drinks    Drug use: No    Sexual activity: Never   Lifestyle    Physical activity     Days per week: Not on file     Minutes per session: Not on file    Stress: Not on file   Relationships    Social connections     Talks on phone: Not on file     Gets together: Not on file     Attends Faith service: Not on file     Active member of club or organization: Not on General: Bowel sounds are normal. There is no distension or abdominal bruit. Palpations: Abdomen is soft. Abdomen is not rigid. There is no fluid wave, mass or pulsatile mass. Tenderness: There is no abdominal tenderness. There is no guarding or rebound. Hernia: No hernia is present. There is no hernia in the ventral area, right inguinal area or left inguinal area. Musculoskeletal: Normal range of motion. General: No tenderness. Lymphadenopathy:      Cervical: No cervical adenopathy. Upper Body:      Right upper body: No supraclavicular adenopathy. Left upper body: No supraclavicular adenopathy. Skin:     General: Skin is warm and dry. Coloration: Skin is not pale. Findings: No abrasion, bruising, burn, erythema, laceration or rash. Nails: There is no clubbing. Neurological:      Mental Status: She is alert and oriented to person, place, and time. Cranial Nerves: No cranial nerve deficit. Sensory: No sensory deficit. Gait: Gait normal.   Psychiatric:         Speech: Speech normal.         Behavior: Behavior normal. Behavior is cooperative. Thought Content: Thought content normal.         Judgment: Judgment normal.       Lab Results   Component Value Date     05/07/2020    K 4.1 05/07/2020     05/07/2020    CO2 26 05/07/2020     Lab Results   Component Value Date    CREATININE 0.7 05/07/2020     Lab Results   Component Value Date    WBC 3.9 (L) 02/03/2020    HGB 13.6 02/03/2020    HCT 43.3 02/03/2020    MCV 94.3 02/03/2020     02/03/2020     Imaging -   Narrative   PROCEDURE: LIVER ULTRASOUND WITH DOPPLER ANALYSIS       CLINICAL INFORMATION: RUQ abdominal pain       TECHNIQUE: Multiple sonographic images of the upper abdomen were obtained with specific attention given to the liver. A few images of the pancreas, and right kidney were also obtained.  Color Doppler imaging of the main vessels in and about the liver was performed along with Spectral Doppler analysis.        FINDINGS:        Liver - L= 16.4 cm    Gallbladder - surgically removed    Common Duct - 1 cm           Liver: The liver is normal in size, contour, and echogenicity. No lesions are seen. No abnormally dilated bile ducts are seen. Color Doppler imaging of the major vessels in and about the liver and spectral Doppler waveforms reveal no abnormalities. Spectral Doppler waveforms right hepatic artery, hepatic veins, portal vein, and inferior vena cava are normal. Portal venous flow is hepatopedal.       pancreas, right kidney: Unremarkable.       Common bile duct: Normal in caliber. .       Gallbladder : Surgically absent.               Impression   Normal liver ultrasound with Doppler analysis.             **This report has been created using voice recognition software.  It may contain minor errors which are inherent in voice recognition technology. **       Final report electronically signed by Dr. Tevin Austin on 2/7/2020 9:40 AM               Patient Active Problem List   Diagnosis    Pure hypercholesterolemia    Vitamin D deficiency    Hypothyroidism    Osteopenia    Parkinson disease ([facility] [address]    Restless leg syndrome    Personal history of DVT (deep vein thrombosis)    Acute diverticulitis    Left lower quadrant pain    Hepatic steatosis     Assessment:      1. Unresectable ascending colon polyp/mass      Plan:      1. Schedule Sivakumar Kirk for robotic possible open right colectomy. 2. She will undergo pre-operative clearance per anesthesia guidelines with risk factors listed under the past medical history diagnosis & problem list.  3. The risks, benefits and alternatives were discussed with Sivakumar Kirk including non-operative management. The pros and cons of robotic, laparoscopic and open techniques were discussed. All questions answered. She understands and wishes to proceed with surgical intervention.   4. Restrictions discussed with Sivakumar Kirk and she

## 2020-05-11 ENCOUNTER — HOSPITAL ENCOUNTER (OUTPATIENT)
Age: 80
Discharge: HOME | End: 2020-05-11
Payer: MEDICARE

## 2020-05-11 DIAGNOSIS — D12.6 TUBULAR ADENOMA OF COLON: ICD-10-CM

## 2020-05-11 DIAGNOSIS — Z01.818 PRE-OP TESTING: ICD-10-CM

## 2020-05-11 DIAGNOSIS — I10 ESSENTIAL HYPERTENSION: ICD-10-CM

## 2020-05-11 LAB
ANION GAP SERPL CALCULATED.3IONS-SCNC: 9 MEQ/L (ref 8–16)
BUN BLDV-MCNC: 17 MG/DL (ref 7–22)
CALCIUM SERPL-MCNC: 9.8 MG/DL (ref 8.5–10.5)
CHLORIDE BLD-SCNC: 105 MEQ/L (ref 98–111)
CO2: 27 MEQ/L (ref 23–33)
CREAT SERPL-MCNC: 0.7 MG/DL (ref 0.4–1.2)
EKG ATRIAL RATE: 65 BPM
EKG P-R INTERVAL: 160 MS
EKG Q-T INTERVAL: 396 MS
EKG QRS DURATION: 104 MS
EKG QTC CALCULATION (BAZETT): 411 MS
EKG R AXIS: -69 DEGREES
EKG T AXIS: 56 DEGREES
EKG VENTRICULAR RATE: 65 BPM
GFR SERPL CREATININE-BSD FRML MDRD: 81 ML/MIN/1.73M2
GLUCOSE BLD-MCNC: 105 MG/DL (ref 70–108)
HCT VFR BLD CALC: 43.4 % (ref 37–47)
HEMOGLOBIN: 13.7 GM/DL (ref 12–16)
POTASSIUM SERPL-SCNC: 4 MEQ/L (ref 3.5–5.2)
SODIUM BLD-SCNC: 141 MEQ/L (ref 135–145)

## 2020-05-11 PROCEDURE — 85014 HEMATOCRIT: CPT

## 2020-05-11 PROCEDURE — 85018 HEMOGLOBIN: CPT

## 2020-05-11 PROCEDURE — 80048 BASIC METABOLIC PNL TOTAL CA: CPT

## 2020-05-11 PROCEDURE — 93005 ELECTROCARDIOGRAM TRACING: CPT | Performed by: SURGERY

## 2020-05-11 PROCEDURE — 93010 ELECTROCARDIOGRAM REPORT: CPT | Performed by: INTERNAL MEDICINE

## 2020-05-11 PROCEDURE — 36415 COLL VENOUS BLD VENIPUNCTURE: CPT

## 2020-05-14 ENCOUNTER — TELEPHONE (OUTPATIENT)
Dept: SURGERY | Age: 80
End: 2020-05-14

## 2020-05-15 ENCOUNTER — TELEPHONE (OUTPATIENT)
Dept: SURGERY | Age: 80
End: 2020-05-15

## 2020-05-15 NOTE — TELEPHONE ENCOUNTER
Called pt is set up for a stress test at [facility] on 5-21-20 at 1pm arrive at 12:30pm go to the 2nd floor heart center.      Prep:   Appointment Instructions   Visit Type: STR LEXISCAN STR TEST     STR LEXISCAN STR TEST  SCHEDULING INSTRUCTIONS:   * Please DO NOT wear dry fit or compression clothing  * MUST coordinate procedure with Cardiologists office  * If scheduling Echo AND NM Stress Test MUST always schedule ECHO FIRST to protect the Echo staff from the radioactive isotope  * Cannot schedule procedure if patient is more than 400 pounds     PATIENT PREPS:  * Arrive 30 minutes prior to injection time (first appointment time)  * [facility]  * Do not eat or drink anything 6 hours before your test (you may have a few sips of water to take medication)  * No caffeine 24 hours prior: No coffee (regular or decaf), tea, chocolate, or soda  * If you take Viagra, hold for 24 hours prior  * Can take medications as normal  * No Xanthine medication 36-48 hours prior  * Bring a list of current medications including inhalers  * For late appointments or stat add ons please call the department at [phone] for approval

## 2020-05-21 ENCOUNTER — HOSPITAL ENCOUNTER (OUTPATIENT)
Dept: NON INVASIVE DIAGNOSTICS | Age: 80
Discharge: HOME | End: 2020-05-21
Payer: MEDICARE

## 2020-05-21 VITALS — WEIGHT: 175 LBS | BODY MASS INDEX: 37.87 KG/M2

## 2020-05-21 PROCEDURE — 93017 CV STRESS TEST TRACING ONLY: CPT | Performed by: NUCLEAR MEDICINE

## 2020-05-21 PROCEDURE — A9500 TC99M SESTAMIBI: HCPCS | Performed by: FAMILY MEDICINE

## 2020-05-21 PROCEDURE — 78452 HT MUSCLE IMAGE SPECT MULT: CPT

## 2020-05-21 PROCEDURE — 6360000002 HC RX W HCPCS

## 2020-05-21 PROCEDURE — 3430000000 HC RX DIAGNOSTIC RADIOPHARMACEUTICAL: Performed by: FAMILY MEDICINE

## 2020-05-21 RX ADMIN — Medication 31.9 MILLICURIE: at 14:45

## 2020-05-21 RX ADMIN — Medication 8.9 MILLICURIE: at 13:10

## 2020-05-30 NOTE — H&P
Subjective:   Patient ID: Candace Naylor is a 78 y.o. female. Chief Complaint   Patient presents with    Surgical Consult     new patient--ref by Dr. Sidra Baldwin for large tubular adenoma-needs surgical resection     HPI   Thelma Alfonso is a 78-year-old female who I was asked to see in consultation secondary to an unresectable a sending colon polyp/mass. Biopsy demonstrated tubular adenoma. Dr. Sidra Baldwin is concern for developing malignancy. Patient admits to right-sided abdominal discomfort. Mild bloating/distention. No generalized abdominal pain. Normal bowel function. No hematochezia or melena. No urinary complaints. Tolerating diet. No unexplained weight loss. No fever, chills or sweats. Patient admits to previous cholecystectomy and appendectomy. Denies any significant family history in regards to colorectal disease that she is aware of. She states she would like to proceed with resection if at all possible. Review of Systems   Constitutional: Negative for activity change, appetite change, chills, diaphoresis, fatigue, fever and unexpected weight change. HENT: Negative for congestion, dental problem, drooling, ear discharge, ear pain, facial swelling, hearing loss, mouth sores, nosebleeds, postnasal drip, rhinorrhea, sinus pressure, sneezing, sore throat, tinnitus, trouble swallowing and voice change. Eyes: Negative for photophobia, pain, discharge, redness, itching and visual disturbance. Respiratory: Negative for apnea, cough, choking, chest tightness, shortness of breath, wheezing and stridor. Cardiovascular: Negative for chest pain, palpitations and leg swelling. Gastrointestinal: Positive for abdominal distention and abdominal pain. Negative for anal bleeding, blood in stool, constipation, diarrhea, nausea, rectal pain and vomiting. Endocrine: Negative.    Genitourinary: Negative for decreased urine volume, difficulty urinating, dyspareunia, dysuria, enuresis, flank pain, frequency, genital sores, hematuria, menstrual problem, pelvic pain, urgency, vaginal bleeding, vaginal discharge and vaginal pain. Musculoskeletal: Negative for arthralgias, back pain, gait problem, joint swelling, myalgias, neck pain and neck stiffness. Skin: Negative for color change, pallor, rash and wound. Allergic/Immunologic: Negative. Neurological: Negative for dizziness, tremors, seizures, syncope, facial asymmetry, speech difficulty, weakness, light-headedness, numbness and headaches. Hematological: Negative for adenopathy. Does not bruise/bleed easily. Psychiatric/Behavioral: Negative for agitation, behavioral problems, confusion, decreased concentration, dysphoric mood, hallucinations, self-injury, sleep disturbance and suicidal ideas. The patient is not nervous/anxious and is not hyperactive.      Past Medical History        Past Medical History:   Diagnosis Date    Diverticulosis     Fatty liver     GERD (gastroesophageal reflux disease)     was on prilosec in past    Hiatal hernia     Hx of blood clots     Hyperlipidemia     Hypertension     Hypothyroidism     Incontinence     urinary    Osteoarthritis     Parkinson's disease ([facility] [address]     Stress fracture     left distal radius    Tubular adenoma     colononoscopy 2010- Dr. Donato Kerr; large sigmoid polyp colonoscopy 2016     Past Surgical History         Past Surgical History:   Procedure Laterality Date    BLADDER SUSPENSION      CATARACT REMOVAL  05 23 2015   [facility] COLONOSCOPY  2010, june 2016    Dr. Donato Kerr- polyps removed, Dr. Brandon Jay  03/2020    Dr. Adriana Berry Right 08/23/2016    Plate inserted into rt. leg    [facility]CTOMY      [facility]- Dr. Brennen Lawler  03/2020    Dr. Jeff Monaco release     Current Facility-Administered Medications          Current Outpatient Medications   Medication Sig Dispense Refill    omeprazole (PRILOSEC) 40 MG delayed release capsule TAKE 1 CAPSULE BY MOUTH ONCE DAILY      NONFORMULARY Respire Pink Micro Sleep Appliance      rosuvastatin (CRESTOR) 10 MG tablet Take 1 tablet by mouth nightly 90 tablet 3    levothyroxine (SYNTHROID) 50 MCG tablet TAKE 1 TABLET DAILY FOR LOW THYROID 90 tablet 3    pramipexole (MIRAPEX) 0.5 MG tablet TAKE 1 TABLET TWICE A  tablet 3    ACETAMINOPHEN PO Take 1,000 mg by mouth daily as needed (pain)      No current facility-administered medications for this visit.              Allergies   Allergen Reactions    Penicillins      Unsure of reaction    Lipitor [Atorvastatin] Other (See Comments)     Muscle aches     Family History         Family History   Problem Relation Age of Onset    Parkinsonism Sister     Cancer Father     Colon Cancer Brother     Lung Cancer Brother     Heart Disease Paternal Grandmother     >56 yo     Social History         Socioeconomic History    Marital status:      Spouse name: Etoile Home Number of children: 2    Years of education: 15    Highest education level: Not on file   Occupational History    Occupation: retired   Social Needs    Financial resource strain: Not hard at all   [employer] insecurity     Worry: Never true     Inability: Never true   [email or web address] needs     Medical: Not on file     Non-medical: Not on file   Tobacco Use    Smoking status: Former Smoker     Packs/day: 0.25     Years: 1.00     Pack years: 0.25     Types: Cigarettes     Last attempt to quit: 1959     Years since quittin.8    Smokeless tobacco: Never Used   Substance and Sexual Activity    Alcohol use: No     Alcohol/week: 0.0 standard drinks    Drug use: No    Sexual activity: Never   Lifestyle    Physical activity     Days per week: Not on file     Minutes per session: Not on file    Stress: Not on file   Relationships    Social connections     Talks on phone: Not on file     Gets together: tenderness. Abdominal:   General: Bowel sounds are normal. There is no distension or abdominal bruit. Palpations: Abdomen is soft. Abdomen is not rigid. There is no fluid wave, mass or pulsatile mass. Tenderness: There is no abdominal tenderness. There is no guarding or rebound. Hernia: No hernia is present. There is no hernia in the ventral area, right inguinal area or left inguinal area. Musculoskeletal: Normal range of motion. General: No tenderness. Lymphadenopathy:   Cervical: No cervical adenopathy. Upper Body:   Right upper body: No supraclavicular adenopathy. Left upper body: No supraclavicular adenopathy. Skin:   General: Skin is warm and dry. Coloration: Skin is not pale. Findings: No abrasion, bruising, burn, erythema, laceration or rash. Nails: There is no clubbing. Neurological:   Mental Status: She is alert and oriented to person, place, and time. Cranial Nerves: No cranial nerve deficit. Sensory: No sensory deficit. Gait: Gait normal.   Psychiatric:   Speech: Speech normal.   Behavior: Behavior normal. Behavior is cooperative. Thought Content: Thought content normal.   Judgment: Judgment normal.           Lab Results   Component Value Date     05/07/2020    K 4.1 05/07/2020     05/07/2020    CO2 26 05/07/2020           Lab Results   Component Value Date    CREATININE 0.7 05/07/2020           Lab Results   Component Value Date    WBC 3.9 (L) 02/03/2020    HGB 13.6 02/03/2020    HCT 43.3 02/03/2020    MCV 94.3 02/03/2020     02/03/2020     Imaging -   Narrative   PROCEDURE: LIVER ULTRASOUND WITH DOPPLER ANALYSIS      CLINICAL INFORMATION: RUQ abdominal pain      TECHNIQUE: Multiple sonographic images of the upper abdomen were obtained with specific attention given to the liver. A few images of the pancreas, and right kidney were also obtained.  Color Doppler imaging of the main vessels in and about the liver was    performed along with Spectral

## 2020-06-01 ENCOUNTER — HOSPITAL ENCOUNTER (OUTPATIENT)
Age: 80
Discharge: HOME | DRG: 331 | End: 2020-06-01
Payer: MEDICARE

## 2020-06-01 PROCEDURE — U0002 COVID-19 LAB TEST NON-CDC: HCPCS

## 2020-06-02 LAB
PERFORMING LAB: NORMAL
REPORT: NORMAL
SARS-COV-2: NOT DETECTED

## 2020-06-04 ENCOUNTER — HOSPITAL ENCOUNTER (INPATIENT)
Age: 80
LOS: 5 days | Discharge: HOME | DRG: 331 | End: 2020-06-09
Attending: SURGERY | Admitting: SURGERY
Payer: MEDICARE

## 2020-06-04 ENCOUNTER — ANESTHESIA EVENT (OUTPATIENT)
Dept: OPERATING ROOM | Age: 80
DRG: 331 | End: 2020-06-04
Payer: MEDICARE

## 2020-06-04 ENCOUNTER — ANESTHESIA (OUTPATIENT)
Dept: OPERATING ROOM | Age: 80
DRG: 331 | End: 2020-06-04
Payer: MEDICARE

## 2020-06-04 VITALS
SYSTOLIC BLOOD PRESSURE: 146 MMHG | RESPIRATION RATE: 2 BRPM | OXYGEN SATURATION: 95 % | TEMPERATURE: 96.1 F | DIASTOLIC BLOOD PRESSURE: 75 MMHG

## 2020-06-04 PROBLEM — D36.9 TUBULAR ADENOMA: Status: ACTIVE | Noted: 2020-06-04

## 2020-06-04 LAB
ABO: NORMAL
ANTIBODY SCREEN: NORMAL
RH FACTOR: NORMAL

## 2020-06-04 PROCEDURE — 2580000003 HC RX 258: Performed by: SURGERY

## 2020-06-04 PROCEDURE — 2500000003 HC RX 250 WO HCPCS: Performed by: SURGERY

## 2020-06-04 PROCEDURE — 2720000010 HC SURG SUPPLY STERILE: Performed by: SURGERY

## 2020-06-04 PROCEDURE — 6370000000 HC RX 637 (ALT 250 FOR IP): Performed by: SURGERY

## 2020-06-04 PROCEDURE — 6360000002 HC RX W HCPCS: Performed by: NURSE ANESTHETIST, CERTIFIED REGISTERED

## 2020-06-04 PROCEDURE — 2709999900 HC NON-CHARGEABLE SUPPLY: Performed by: SURGERY

## 2020-06-04 PROCEDURE — S2900 ROBOTIC SURGICAL SYSTEM: HCPCS | Performed by: SURGERY

## 2020-06-04 PROCEDURE — 7100000000 HC PACU RECOVERY - FIRST 15 MIN: Performed by: SURGERY

## 2020-06-04 PROCEDURE — 1200000000 HC SEMI PRIVATE

## 2020-06-04 PROCEDURE — 36415 COLL VENOUS BLD VENIPUNCTURE: CPT

## 2020-06-04 PROCEDURE — 7100000001 HC PACU RECOVERY - ADDTL 15 MIN: Performed by: SURGERY

## 2020-06-04 PROCEDURE — 86900 BLOOD TYPING SEROLOGIC ABO: CPT

## 2020-06-04 PROCEDURE — 44205 LAP COLECTOMY PART W/ILEUM: CPT | Performed by: SURGERY

## 2020-06-04 PROCEDURE — 6370000000 HC RX 637 (ALT 250 FOR IP)

## 2020-06-04 PROCEDURE — 88307 TISSUE EXAM BY PATHOLOGIST: CPT

## 2020-06-04 PROCEDURE — 3700000001 HC ADD 15 MINUTES (ANESTHESIA): Performed by: SURGERY

## 2020-06-04 PROCEDURE — 86901 BLOOD TYPING SEROLOGIC RH(D): CPT

## 2020-06-04 PROCEDURE — 6360000002 HC RX W HCPCS: Performed by: SURGERY

## 2020-06-04 PROCEDURE — 86850 RBC ANTIBODY SCREEN: CPT

## 2020-06-04 PROCEDURE — 3600000019 HC SURGERY ROBOT ADDTL 15MIN: Performed by: SURGERY

## 2020-06-04 PROCEDURE — 3600000009 HC SURGERY ROBOT BASE: Performed by: SURGERY

## 2020-06-04 PROCEDURE — 6360000002 HC RX W HCPCS: Performed by: ANESTHESIOLOGY

## 2020-06-04 PROCEDURE — 0DTF4ZZ RESECTION OF RIGHT LARGE INTESTINE, PERCUTANEOUS ENDOSCOPIC APPROACH: ICD-10-PCS | Performed by: SURGERY

## 2020-06-04 PROCEDURE — 2500000003 HC RX 250 WO HCPCS: Performed by: NURSE ANESTHETIST, CERTIFIED REGISTERED

## 2020-06-04 PROCEDURE — 3700000000 HC ANESTHESIA ATTENDED CARE: Performed by: SURGERY

## 2020-06-04 PROCEDURE — 8E0W4CZ ROBOTIC ASSISTED PROCEDURE OF TRUNK REGION, PERCUTANEOUS ENDOSCOPIC APPROACH: ICD-10-PCS | Performed by: SURGERY

## 2020-06-04 RX ORDER — SODIUM CHLORIDE 0.9 % (FLUSH) 0.9 %
10 SYRINGE (ML) INJECTION PRN
Status: DISCONTINUED | OUTPATIENT
Start: 2020-06-04 | End: 2020-06-09 | Stop reason: HOSPADM

## 2020-06-04 RX ORDER — PROPOFOL 10 MG/ML
INJECTION, EMULSION INTRAVENOUS PRN
Status: DISCONTINUED | OUTPATIENT
Start: 2020-06-04 | End: 2020-06-04 | Stop reason: SDUPTHER

## 2020-06-04 RX ORDER — FENTANYL CITRATE 50 UG/ML
INJECTION, SOLUTION INTRAMUSCULAR; INTRAVENOUS PRN
Status: DISCONTINUED | OUTPATIENT
Start: 2020-06-04 | End: 2020-06-04 | Stop reason: SDUPTHER

## 2020-06-04 RX ORDER — MORPHINE SULFATE 2 MG/ML
2 INJECTION, SOLUTION INTRAMUSCULAR; INTRAVENOUS
Status: DISCONTINUED | OUTPATIENT
Start: 2020-06-04 | End: 2020-06-09 | Stop reason: HOSPADM

## 2020-06-04 RX ORDER — PROMETHAZINE HYDROCHLORIDE 25 MG/1
12.5 TABLET ORAL EVERY 6 HOURS PRN
Status: DISCONTINUED | OUTPATIENT
Start: 2020-06-04 | End: 2020-06-09 | Stop reason: HOSPADM

## 2020-06-04 RX ORDER — ROCURONIUM BROMIDE 10 MG/ML
INJECTION, SOLUTION INTRAVENOUS PRN
Status: DISCONTINUED | OUTPATIENT
Start: 2020-06-04 | End: 2020-06-04 | Stop reason: SDUPTHER

## 2020-06-04 RX ORDER — SODIUM CHLORIDE 9 MG/ML
INJECTION, SOLUTION INTRAVENOUS CONTINUOUS
Status: DISCONTINUED | OUTPATIENT
Start: 2020-06-04 | End: 2020-06-04

## 2020-06-04 RX ORDER — DIPHENHYDRAMINE HYDROCHLORIDE 50 MG/ML
12.5 INJECTION INTRAMUSCULAR; INTRAVENOUS
Status: DISCONTINUED | OUTPATIENT
Start: 2020-06-04 | End: 2020-06-04 | Stop reason: HOSPADM

## 2020-06-04 RX ORDER — SODIUM CHLORIDE 9 MG/ML
INJECTION, SOLUTION INTRAVENOUS CONTINUOUS
Status: DISCONTINUED | OUTPATIENT
Start: 2020-06-04 | End: 2020-06-05

## 2020-06-04 RX ORDER — HYDRALAZINE HYDROCHLORIDE 20 MG/ML
5 INJECTION INTRAMUSCULAR; INTRAVENOUS EVERY 10 MIN PRN
Status: DISCONTINUED | OUTPATIENT
Start: 2020-06-04 | End: 2020-06-04 | Stop reason: HOSPADM

## 2020-06-04 RX ORDER — DEXAMETHASONE SODIUM PHOSPHATE 4 MG/ML
INJECTION, SOLUTION INTRA-ARTICULAR; INTRALESIONAL; INTRAMUSCULAR; INTRAVENOUS; SOFT TISSUE PRN
Status: DISCONTINUED | OUTPATIENT
Start: 2020-06-04 | End: 2020-06-04 | Stop reason: SDUPTHER

## 2020-06-04 RX ORDER — MIDAZOLAM HYDROCHLORIDE 1 MG/ML
INJECTION INTRAMUSCULAR; INTRAVENOUS PRN
Status: DISCONTINUED | OUTPATIENT
Start: 2020-06-04 | End: 2020-06-04 | Stop reason: SDUPTHER

## 2020-06-04 RX ORDER — ONDANSETRON 2 MG/ML
4 INJECTION INTRAMUSCULAR; INTRAVENOUS
Status: COMPLETED | OUTPATIENT
Start: 2020-06-04 | End: 2020-06-04

## 2020-06-04 RX ORDER — LABETALOL 20 MG/4 ML (5 MG/ML) INTRAVENOUS SYRINGE
5 EVERY 5 MIN PRN
Status: DISCONTINUED | OUTPATIENT
Start: 2020-06-04 | End: 2020-06-04 | Stop reason: HOSPADM

## 2020-06-04 RX ORDER — NEOSTIGMINE METHYLSULFATE 1 MG/ML
INJECTION, SOLUTION INTRAVENOUS PRN
Status: DISCONTINUED | OUTPATIENT
Start: 2020-06-04 | End: 2020-06-04 | Stop reason: SDUPTHER

## 2020-06-04 RX ORDER — MORPHINE SULFATE 2 MG/ML
2 INJECTION, SOLUTION INTRAMUSCULAR; INTRAVENOUS EVERY 5 MIN PRN
Status: DISCONTINUED | OUTPATIENT
Start: 2020-06-04 | End: 2020-06-04 | Stop reason: HOSPADM

## 2020-06-04 RX ORDER — HYDROCODONE BITARTRATE AND ACETAMINOPHEN 5; 325 MG/1; MG/1
1 TABLET ORAL EVERY 4 HOURS PRN
Status: DISCONTINUED | OUTPATIENT
Start: 2020-06-04 | End: 2020-06-09 | Stop reason: HOSPADM

## 2020-06-04 RX ORDER — CIPROFLOXACIN 2 MG/ML
400 INJECTION, SOLUTION INTRAVENOUS
Status: COMPLETED | OUTPATIENT
Start: 2020-06-04 | End: 2020-06-04

## 2020-06-04 RX ORDER — ACETAMINOPHEN 160 MG
TABLET,DISINTEGRATING ORAL
COMMUNITY
End: 2020-07-27 | Stop reason: ALTCHOICE

## 2020-06-04 RX ORDER — SCOLOPAMINE TRANSDERMAL SYSTEM 1 MG/1
1 PATCH, EXTENDED RELEASE TRANSDERMAL ONCE
Status: COMPLETED | OUTPATIENT
Start: 2020-06-04 | End: 2020-06-07

## 2020-06-04 RX ORDER — BUPIVACAINE HYDROCHLORIDE 5 MG/ML
INJECTION, SOLUTION PERINEURAL PRN
Status: DISCONTINUED | OUTPATIENT
Start: 2020-06-04 | End: 2020-06-04 | Stop reason: HOSPADM

## 2020-06-04 RX ORDER — HYDROCODONE BITARTRATE AND ACETAMINOPHEN 5; 325 MG/1; MG/1
2 TABLET ORAL EVERY 4 HOURS PRN
Status: DISCONTINUED | OUTPATIENT
Start: 2020-06-04 | End: 2020-06-09 | Stop reason: HOSPADM

## 2020-06-04 RX ORDER — SCOLOPAMINE TRANSDERMAL SYSTEM 1 MG/1
PATCH, EXTENDED RELEASE TRANSDERMAL
Status: COMPLETED
Start: 2020-06-04 | End: 2020-06-07

## 2020-06-04 RX ORDER — ALVIMOPAN 12 MG/1
12 CAPSULE ORAL ONCE
Status: COMPLETED | OUTPATIENT
Start: 2020-06-04 | End: 2020-06-04

## 2020-06-04 RX ORDER — SODIUM CHLORIDE 0.9 % (FLUSH) 0.9 %
10 SYRINGE (ML) INJECTION EVERY 12 HOURS SCHEDULED
Status: DISCONTINUED | OUTPATIENT
Start: 2020-06-04 | End: 2020-06-09 | Stop reason: HOSPADM

## 2020-06-04 RX ORDER — FENTANYL CITRATE 50 UG/ML
50 INJECTION, SOLUTION INTRAMUSCULAR; INTRAVENOUS EVERY 5 MIN PRN
Status: DISCONTINUED | OUTPATIENT
Start: 2020-06-04 | End: 2020-06-04 | Stop reason: HOSPADM

## 2020-06-04 RX ORDER — ONDANSETRON 2 MG/ML
4 INJECTION INTRAMUSCULAR; INTRAVENOUS EVERY 6 HOURS PRN
Status: DISCONTINUED | OUTPATIENT
Start: 2020-06-04 | End: 2020-06-09 | Stop reason: HOSPADM

## 2020-06-04 RX ORDER — MORPHINE SULFATE 4 MG/ML
4 INJECTION, SOLUTION INTRAMUSCULAR; INTRAVENOUS
Status: DISCONTINUED | OUTPATIENT
Start: 2020-06-04 | End: 2020-06-09 | Stop reason: HOSPADM

## 2020-06-04 RX ORDER — MEPERIDINE HYDROCHLORIDE 25 MG/ML
12.5 INJECTION INTRAMUSCULAR; INTRAVENOUS; SUBCUTANEOUS EVERY 5 MIN PRN
Status: DISCONTINUED | OUTPATIENT
Start: 2020-06-04 | End: 2020-06-04 | Stop reason: HOSPADM

## 2020-06-04 RX ADMIN — SODIUM CHLORIDE: 9 INJECTION, SOLUTION INTRAVENOUS at 10:37

## 2020-06-04 RX ADMIN — ONDANSETRON HYDROCHLORIDE 4 MG: 4 INJECTION, SOLUTION INTRAMUSCULAR; INTRAVENOUS at 11:47

## 2020-06-04 RX ADMIN — FENTANYL CITRATE 100 MCG: 50 INJECTION INTRAMUSCULAR; INTRAVENOUS at 10:59

## 2020-06-04 RX ADMIN — ONDANSETRON HYDROCHLORIDE 4 MG: 4 INJECTION, SOLUTION INTRAMUSCULAR; INTRAVENOUS at 13:14

## 2020-06-04 RX ADMIN — SODIUM CHLORIDE: 9 INJECTION, SOLUTION INTRAVENOUS at 10:57

## 2020-06-04 RX ADMIN — DEXAMETHASONE SODIUM PHOSPHATE 4 MG: 4 INJECTION, SOLUTION INTRAMUSCULAR; INTRAVENOUS at 11:47

## 2020-06-04 RX ADMIN — HYDRALAZINE HYDROCHLORIDE 5 MG: 20 INJECTION, SOLUTION INTRAMUSCULAR; INTRAVENOUS at 13:55

## 2020-06-04 RX ADMIN — MIDAZOLAM HYDROCHLORIDE 1 MG: 1 INJECTION, SOLUTION INTRAMUSCULAR; INTRAVENOUS at 10:57

## 2020-06-04 RX ADMIN — ROCURONIUM BROMIDE 20 MG: 10 INJECTION INTRAVENOUS at 12:01

## 2020-06-04 RX ADMIN — NEOSTIGMINE METHYLSULFATE 4 MG: 1 INJECTION, SOLUTION INTRAVENOUS at 13:24

## 2020-06-04 RX ADMIN — ALVIMOPAN 12 MG: 12 CAPSULE ORAL at 10:10

## 2020-06-04 RX ADMIN — MORPHINE SULFATE 4 MG: 4 INJECTION, SOLUTION INTRAMUSCULAR; INTRAVENOUS at 16:02

## 2020-06-04 RX ADMIN — FENTANYL CITRATE 50 MCG: 50 INJECTION INTRAMUSCULAR; INTRAVENOUS at 13:28

## 2020-06-04 RX ADMIN — PROPOFOL 150 MG: 10 INJECTION, EMULSION INTRAVENOUS at 10:59

## 2020-06-04 RX ADMIN — Medication 10 ML: at 19:46

## 2020-06-04 RX ADMIN — MORPHINE SULFATE 2 MG: 2 INJECTION, SOLUTION INTRAMUSCULAR; INTRAVENOUS at 14:25

## 2020-06-04 RX ADMIN — CIPROFLOXACIN 400 MG: 2 INJECTION, SOLUTION INTRAVENOUS at 10:36

## 2020-06-04 RX ADMIN — FAMOTIDINE 20 MG: 10 INJECTION, SOLUTION INTRAVENOUS at 19:45

## 2020-06-04 RX ADMIN — METRONIDAZOLE 500 MG: 500 INJECTION, SOLUTION INTRAVENOUS at 11:12

## 2020-06-04 RX ADMIN — MORPHINE SULFATE 2 MG: 2 INJECTION, SOLUTION INTRAMUSCULAR; INTRAVENOUS at 14:15

## 2020-06-04 RX ADMIN — FENTANYL CITRATE 50 MCG: 50 INJECTION INTRAMUSCULAR; INTRAVENOUS at 13:24

## 2020-06-04 RX ADMIN — FENTANYL CITRATE 50 MCG: 50 INJECTION INTRAMUSCULAR; INTRAVENOUS at 12:40

## 2020-06-04 RX ADMIN — MIDAZOLAM HYDROCHLORIDE 1 MG: 1 INJECTION, SOLUTION INTRAMUSCULAR; INTRAVENOUS at 11:02

## 2020-06-04 RX ADMIN — SODIUM CHLORIDE: 9 INJECTION, SOLUTION INTRAVENOUS at 17:55

## 2020-06-04 RX ADMIN — ROCURONIUM BROMIDE 50 MG: 10 INJECTION INTRAVENOUS at 10:59

## 2020-06-04 RX ADMIN — FENTANYL CITRATE 50 MCG: 50 INJECTION INTRAMUSCULAR; INTRAVENOUS at 13:13

## 2020-06-04 ASSESSMENT — PAIN DESCRIPTION - ORIENTATION
ORIENTATION: RIGHT;LEFT;MID;LOWER
ORIENTATION: RIGHT;LEFT;MID

## 2020-06-04 ASSESSMENT — PULMONARY FUNCTION TESTS
PIF_VALUE: 27
PIF_VALUE: 2
PIF_VALUE: 21
PIF_VALUE: 29
PIF_VALUE: 28
PIF_VALUE: 28
PIF_VALUE: 21
PIF_VALUE: 28
PIF_VALUE: 26
PIF_VALUE: 29
PIF_VALUE: 20
PIF_VALUE: 28
PIF_VALUE: 1
PIF_VALUE: 27
PIF_VALUE: 3
PIF_VALUE: 21
PIF_VALUE: 27
PIF_VALUE: 28
PIF_VALUE: 28
PIF_VALUE: 1
PIF_VALUE: 28
PIF_VALUE: 1
PIF_VALUE: 26
PIF_VALUE: 29
PIF_VALUE: 21
PIF_VALUE: 2
PIF_VALUE: 28
PIF_VALUE: 28
PIF_VALUE: 21
PIF_VALUE: 26
PIF_VALUE: 28
PIF_VALUE: 21
PIF_VALUE: 28
PIF_VALUE: 28
PIF_VALUE: 29
PIF_VALUE: 27
PIF_VALUE: 29
PIF_VALUE: 19
PIF_VALUE: 28
PIF_VALUE: 27
PIF_VALUE: 21
PIF_VALUE: 29
PIF_VALUE: 21
PIF_VALUE: 29
PIF_VALUE: 2
PIF_VALUE: 21
PIF_VALUE: 21
PIF_VALUE: 28
PIF_VALUE: 28
PIF_VALUE: 1
PIF_VALUE: 29
PIF_VALUE: 28
PIF_VALUE: 28
PIF_VALUE: 3
PIF_VALUE: 21
PIF_VALUE: 2
PIF_VALUE: 28
PIF_VALUE: 21
PIF_VALUE: 1
PIF_VALUE: 26
PIF_VALUE: 3
PIF_VALUE: 28
PIF_VALUE: 29
PIF_VALUE: 1
PIF_VALUE: 28
PIF_VALUE: 11
PIF_VALUE: 21
PIF_VALUE: 28
PIF_VALUE: 29
PIF_VALUE: 28
PIF_VALUE: 2
PIF_VALUE: 29
PIF_VALUE: 29
PIF_VALUE: 21
PIF_VALUE: 28
PIF_VALUE: 0
PIF_VALUE: 29
PIF_VALUE: 18
PIF_VALUE: 21
PIF_VALUE: 28
PIF_VALUE: 21
PIF_VALUE: 28
PIF_VALUE: 1
PIF_VALUE: 28
PIF_VALUE: 29
PIF_VALUE: 0
PIF_VALUE: 29
PIF_VALUE: 21
PIF_VALUE: 27
PIF_VALUE: 29
PIF_VALUE: 20
PIF_VALUE: 28
PIF_VALUE: 21
PIF_VALUE: 21
PIF_VALUE: 28
PIF_VALUE: 2
PIF_VALUE: 29
PIF_VALUE: 27
PIF_VALUE: 28
PIF_VALUE: 26
PIF_VALUE: 20
PIF_VALUE: 1
PIF_VALUE: 28
PIF_VALUE: 2
PIF_VALUE: 27
PIF_VALUE: 29
PIF_VALUE: 12
PIF_VALUE: 28
PIF_VALUE: 28
PIF_VALUE: 0
PIF_VALUE: 27
PIF_VALUE: 9
PIF_VALUE: 27
PIF_VALUE: 28
PIF_VALUE: 28
PIF_VALUE: 2
PIF_VALUE: 1
PIF_VALUE: 28
PIF_VALUE: 27
PIF_VALUE: 27
PIF_VALUE: 21
PIF_VALUE: 29
PIF_VALUE: 2
PIF_VALUE: 28
PIF_VALUE: 0
PIF_VALUE: 27
PIF_VALUE: 28
PIF_VALUE: 0
PIF_VALUE: 28
PIF_VALUE: 28
PIF_VALUE: 29
PIF_VALUE: 29
PIF_VALUE: 30
PIF_VALUE: 2
PIF_VALUE: 28
PIF_VALUE: 28
PIF_VALUE: 2
PIF_VALUE: 28
PIF_VALUE: 28
PIF_VALUE: 29
PIF_VALUE: 28
PIF_VALUE: 27
PIF_VALUE: 20
PIF_VALUE: 4
PIF_VALUE: 28
PIF_VALUE: 1
PIF_VALUE: 25
PIF_VALUE: 30
PIF_VALUE: 27
PIF_VALUE: 29
PIF_VALUE: 28
PIF_VALUE: 21
PIF_VALUE: 21
PIF_VALUE: 27
PIF_VALUE: 28
PIF_VALUE: 4

## 2020-06-04 ASSESSMENT — PAIN SCALES - GENERAL
PAINLEVEL_OUTOF10: 5
PAINLEVEL_OUTOF10: 9
PAINLEVEL_OUTOF10: 7
PAINLEVEL_OUTOF10: 4
PAINLEVEL_OUTOF10: 9
PAINLEVEL_OUTOF10: 4

## 2020-06-04 ASSESSMENT — PAIN DESCRIPTION - PAIN TYPE
TYPE: SURGICAL PAIN
TYPE: SURGICAL PAIN

## 2020-06-04 ASSESSMENT — PAIN - FUNCTIONAL ASSESSMENT
PAIN_FUNCTIONAL_ASSESSMENT: PREVENTS OR INTERFERES SOME ACTIVE ACTIVITIES AND ADLS
PAIN_FUNCTIONAL_ASSESSMENT: 0-10
PAIN_FUNCTIONAL_ASSESSMENT: PREVENTS OR INTERFERES SOME ACTIVE ACTIVITIES AND ADLS

## 2020-06-04 ASSESSMENT — PAIN DESCRIPTION - PROGRESSION
CLINICAL_PROGRESSION: NOT CHANGED

## 2020-06-04 ASSESSMENT — PAIN DESCRIPTION - FREQUENCY
FREQUENCY: INTERMITTENT
FREQUENCY: INTERMITTENT

## 2020-06-04 ASSESSMENT — PAIN DESCRIPTION - ONSET
ONSET: ON-GOING
ONSET: ON-GOING

## 2020-06-04 ASSESSMENT — PAIN DESCRIPTION - DESCRIPTORS
DESCRIPTORS: ACHING

## 2020-06-04 ASSESSMENT — PAIN DESCRIPTION - LOCATION
LOCATION: ABDOMEN
LOCATION: ABDOMEN

## 2020-06-04 NOTE — PROGRESS NOTES
Pt and family oriented to \[facility] [place] [facility]\"" room 2 and unit. Pt verbalized approval for first name, last initial and physician name on unit whiteboard. Fall band applied. Vaccine information given. Plan of care reviewed.

## 2020-06-04 NOTE — PROGRESS NOTES
Patient admitted to room [id number] with daughter present. 0.9 ns running at 100 ml/hr with 700 ml infused and 300 ml left hanging in the bag.   Admission orders gone over with patient and daughter, including iv fluids, medications, diet order,  Room orientation gone over   They voiced understanding,  No  Concerns noted at this time

## 2020-06-04 NOTE — ANESTHESIA PRE PROCEDURE
SURGERY      de Quervains release       Social History:    Social History     Tobacco Use    Smoking status: Former Smoker     Packs/day: 0.25     Years: 1.00     Pack years: 0.25     Types: Cigarettes     Last attempt to quit: 1959     Years since quittin.9    Smokeless tobacco: Never Used   Substance Use Topics    Alcohol use: No     Alcohol/week: 0.0 standard drinks                                Counseling given: Not Answered      Vital Signs (Current): There were no vitals filed for this visit. BP Readings from Last 3 Encounters:   20 138/72   19 136/72   19 128/82       NPO Status: Time of last liquid consumption: 1145                        Time of last solid consumption: 1800                        Date of last liquid consumption: 20                        Date of last solid food consumption: 20    BMI:   Wt Readings from Last 3 Encounters:   20 175 lb (79.4 kg)   20 175 lb (79.4 kg)   19 173 lb 12.8 oz (78.8 kg)     There is no height or weight on file to calculate BMI.    CBC:   Lab Results   Component Value Date    WBC 3.9 2020    RBC 4.59 2020    HGB 13.7 2020    HCT 43.4 2020    MCV 94.3 2020    RDW 13.3 2018     2020       CMP:   Lab Results   Component Value Date     2020    K 4.0 2020    K 4.3 2018     2020    CO2 27 2020    BUN 17 2020    CREATININE 0.7 2020    LABGLOM 81 2020    GLUCOSE 105 2020    GLUCOSE 96 2011    PROT 7.2 2020    CALCIUM 9.8 2020    BILITOT 0.6 2020    ALKPHOS 71 2020    AST 18 2020    ALT 12 2020       POC Tests: No results for input(s): POCGLU, POCNA, POCK, POCCL, POCBUN, POCHEMO, POCHCT in the last 72 hours.     Coags: No results found for: PROTIME, INR, APTT    HCG (If Applicable): No results found for: PREGTESTUR, PREGSERUM, HCG, HCGQUANT     ABGs: No results found for: PHART, PO2ART, YKD2EDP, ZHD1IVU, BEART, F8OGWVUN     Type & Screen (If Applicable):  No results found for: LABABO, LABRH    Drug/Infectious Status (If Applicable):  No results found for: HIV, HEPCAB    COVID-19 Screening (If Applicable):   Lab Results   Component Value Date    COVID19 NOT DETECTED 06/01/2020         Anesthesia Evaluation     history of anesthetic complications: PONV. Airway: Mallampati: II       Mouth opening: > = 3 FB Dental:          Pulmonary:       (-) COPD                           Cardiovascular:    (+) hypertension:,         Rhythm: regular      Stress test reviewed                Neuro/Psych:               GI/Hepatic/Renal:   (+) hiatal hernia, GERD:, liver disease:,           Endo/Other:    (+) hypothyroidism::., .                 Abdominal:           Vascular:                                        Anesthesia Plan      general     ASA 3       Induction: intravenous. Anesthetic plan and risks discussed with patient. Plan discussed with CRNA.                   Melba Harmon MD   6/4/2020

## 2020-06-04 NOTE — PROGRESS NOTES
1345 Pt to PACU drowsy, but arouses to name. Placed onto monitors and 4 L NC. HOB slightly elevated. Pt with ABD binder in place  1355 SDS called for family waiting in pt SDS room. Notified will update family when pt ready to go to room  1400 easily awakens. Pt states not really having any pain  1415 states some abd pain, medicating with morphine IV for moderate pain, see MAR.   1455 Report called to [facility] RN, Rubén Lombardi. Daughter called to PACU, at pt bedside.   1508 to [facility]erson via transport, daughter at bedside, on 3 L NC.

## 2020-06-05 LAB
ANION GAP SERPL CALCULATED.3IONS-SCNC: 10 MEQ/L (ref 8–16)
BUN BLDV-MCNC: 13 MG/DL (ref 7–22)
CALCIUM SERPL-MCNC: 8.3 MG/DL (ref 8.5–10.5)
CHLORIDE BLD-SCNC: 106 MEQ/L (ref 98–111)
CO2: 20 MEQ/L (ref 23–33)
CREAT SERPL-MCNC: 0.8 MG/DL (ref 0.4–1.2)
GFR SERPL CREATININE-BSD FRML MDRD: 69 ML/MIN/1.73M2
GLUCOSE BLD-MCNC: 127 MG/DL (ref 70–108)
HCT VFR BLD CALC: 36.4 % (ref 37–47)
HEMOGLOBIN: 11.5 GM/DL (ref 12–16)
POTASSIUM REFLEX MAGNESIUM: 4.7 MEQ/L (ref 3.5–5.2)
SODIUM BLD-SCNC: 136 MEQ/L (ref 135–145)

## 2020-06-05 PROCEDURE — 6370000000 HC RX 637 (ALT 250 FOR IP): Performed by: NURSE PRACTITIONER

## 2020-06-05 PROCEDURE — 6360000002 HC RX W HCPCS: Performed by: SURGERY

## 2020-06-05 PROCEDURE — 1200000000 HC SEMI PRIVATE

## 2020-06-05 PROCEDURE — 6370000000 HC RX 637 (ALT 250 FOR IP): Performed by: SURGERY

## 2020-06-05 PROCEDURE — 2580000003 HC RX 258: Performed by: NURSE PRACTITIONER

## 2020-06-05 PROCEDURE — 2500000003 HC RX 250 WO HCPCS: Performed by: SURGERY

## 2020-06-05 PROCEDURE — 36415 COLL VENOUS BLD VENIPUNCTURE: CPT

## 2020-06-05 PROCEDURE — 80048 BASIC METABOLIC PNL TOTAL CA: CPT

## 2020-06-05 PROCEDURE — 85018 HEMOGLOBIN: CPT

## 2020-06-05 PROCEDURE — 99024 POSTOP FOLLOW-UP VISIT: CPT | Performed by: NURSE PRACTITIONER

## 2020-06-05 PROCEDURE — APPSS30 APP SPLIT SHARED TIME 16-30 MINUTES: Performed by: NURSE PRACTITIONER

## 2020-06-05 PROCEDURE — 85014 HEMATOCRIT: CPT

## 2020-06-05 PROCEDURE — 2580000003 HC RX 258: Performed by: SURGERY

## 2020-06-05 RX ORDER — PRAMIPEXOLE DIHYDROCHLORIDE 0.25 MG/1
0.5 TABLET ORAL 2 TIMES DAILY
Status: DISCONTINUED | OUTPATIENT
Start: 2020-06-05 | End: 2020-06-09 | Stop reason: HOSPADM

## 2020-06-05 RX ORDER — LEVOTHYROXINE SODIUM 0.05 MG/1
50 TABLET ORAL DAILY
Status: DISCONTINUED | OUTPATIENT
Start: 2020-06-05 | End: 2020-06-09 | Stop reason: HOSPADM

## 2020-06-05 RX ORDER — SODIUM CHLORIDE 9 MG/ML
INJECTION, SOLUTION INTRAVENOUS CONTINUOUS
Status: DISCONTINUED | OUTPATIENT
Start: 2020-06-05 | End: 2020-06-08

## 2020-06-05 RX ORDER — ACETAMINOPHEN 325 MG/1
650 TABLET ORAL EVERY 4 HOURS PRN
Status: DISCONTINUED | OUTPATIENT
Start: 2020-06-05 | End: 2020-06-09 | Stop reason: HOSPADM

## 2020-06-05 RX ADMIN — Medication 10 ML: at 20:00

## 2020-06-05 RX ADMIN — PRAMIPEXOLE DIHYDROCHLORIDE 0.5 MG: 0.25 TABLET ORAL at 19:59

## 2020-06-05 RX ADMIN — SODIUM CHLORIDE: 9 INJECTION, SOLUTION INTRAVENOUS at 14:16

## 2020-06-05 RX ADMIN — FAMOTIDINE 20 MG: 10 INJECTION, SOLUTION INTRAVENOUS at 19:59

## 2020-06-05 RX ADMIN — ENOXAPARIN SODIUM 40 MG: 40 INJECTION SUBCUTANEOUS at 07:34

## 2020-06-05 RX ADMIN — HYDROCODONE BITARTRATE AND ACETAMINOPHEN 1 TABLET: 5; 325 TABLET ORAL at 10:19

## 2020-06-05 RX ADMIN — HYDROCODONE BITARTRATE AND ACETAMINOPHEN 1 TABLET: 5; 325 TABLET ORAL at 21:26

## 2020-06-05 RX ADMIN — Medication 10 ML: at 07:35

## 2020-06-05 RX ADMIN — PRAMIPEXOLE DIHYDROCHLORIDE 0.5 MG: 0.25 TABLET ORAL at 09:37

## 2020-06-05 RX ADMIN — FAMOTIDINE 20 MG: 10 INJECTION, SOLUTION INTRAVENOUS at 07:35

## 2020-06-05 RX ADMIN — LEVOTHYROXINE SODIUM 50 MCG: 50 TABLET ORAL at 09:37

## 2020-06-05 ASSESSMENT — PAIN DESCRIPTION - LOCATION
LOCATION: ABDOMEN
LOCATION: ABDOMEN

## 2020-06-05 ASSESSMENT — PAIN DESCRIPTION - PROGRESSION
CLINICAL_PROGRESSION: NOT CHANGED
CLINICAL_PROGRESSION: GRADUALLY IMPROVING
CLINICAL_PROGRESSION: NOT CHANGED
CLINICAL_PROGRESSION: NOT CHANGED

## 2020-06-05 ASSESSMENT — PAIN SCALES - GENERAL
PAINLEVEL_OUTOF10: 2
PAINLEVEL_OUTOF10: 4
PAINLEVEL_OUTOF10: 3
PAINLEVEL_OUTOF10: 5
PAINLEVEL_OUTOF10: 4
PAINLEVEL_OUTOF10: 4

## 2020-06-05 ASSESSMENT — PAIN DESCRIPTION - ORIENTATION
ORIENTATION: LEFT;RIGHT;MID
ORIENTATION: LEFT;MID;RIGHT

## 2020-06-05 ASSESSMENT — PAIN DESCRIPTION - ONSET
ONSET: ON-GOING
ONSET: ON-GOING

## 2020-06-05 ASSESSMENT — PAIN DESCRIPTION - DESCRIPTORS
DESCRIPTORS: ACHING
DESCRIPTORS: ACHING;CRAMPING

## 2020-06-05 ASSESSMENT — PAIN DESCRIPTION - FREQUENCY
FREQUENCY: INTERMITTENT
FREQUENCY: INTERMITTENT

## 2020-06-05 ASSESSMENT — PAIN - FUNCTIONAL ASSESSMENT
PAIN_FUNCTIONAL_ASSESSMENT: PREVENTS OR INTERFERES SOME ACTIVE ACTIVITIES AND ADLS
PAIN_FUNCTIONAL_ASSESSMENT: PREVENTS OR INTERFERES SOME ACTIVE ACTIVITIES AND ADLS

## 2020-06-05 ASSESSMENT — PAIN DESCRIPTION - PAIN TYPE
TYPE: SURGICAL PAIN
TYPE: ACUTE PAIN;SURGICAL PAIN

## 2020-06-05 NOTE — OP NOTE
[address] [place] [address]                                OPERATIVE REPORT    PATIENT NAME: Meli Whitley                     :        1940  MED REC NO:   [id number]                           ROOM:       0168  ACCOUNT NO:   [de-identified]                           ADMIT DATE: 2020  PROVIDER:     Cole Sorensen M.D.    DATE OF PROCEDURE:  2020    PREOPERATIVE DIAGNOSIS:  Unresectable ascending colon/hepatic flexure  polyp/mass. POSTOPERATIVE DIAGNOSIS:  Unresectable ascending colon/hepatic flexure  polyp/mass. OPERATION PERFORMED:  Robotic right colectomy. SURGEON:  Cole Sorensen MD    ASSISTANT:  Beth Nunez. TIM Salgado    ANESTHESIA:  General/local.    ESTIMATED BLOOD LOSS:  20 mL. DRAINS:  None. COMPLICATIONS:  None. DISPOSITION:  Stable to the recovery room. INDICATIONS:  The patient is a 80-year-old female who underwent a  colonoscopy. She was found to have a flat polyp/mass in the ascending  colon. No malignancy found, but unable to be resected. Biopsies  demonstrated tubular adenoma. Both operative and nonoperative  intervention plans versus repeat colonoscopies were discussed. Risks of  surgery were further discussed. Some of the risks included, but were  not limited to, bleeding, infection, the need for reoperation, severe  chronic postoperative pain or numbness, major vascular or nerve injury,  cardiopulmonary complications, anesthetic complications, seroma/hematoma  formation, wound breakdown, trocar site herniation, anastomotic leak,  anastomotic bleed, anastomotic stricture, chronic pain and death were  discussed. After all of the questions were answered in their entirety  and the patient was completely aware of the current situation, she and  family elected to proceed with the procedure.     DESCRIPTION OF THE PROCEDURE:  After informed consent was signed and  placed on the chart, 06/04/2020 13:44:10       T: 06/04/2020 15:20:43     [email or web address]  Job#: [id number]     Doc#: [id number]    CC:

## 2020-06-06 ENCOUNTER — APPOINTMENT (OUTPATIENT)
Dept: CT IMAGING | Age: 80
DRG: 331 | End: 2020-06-06
Attending: SURGERY
Payer: MEDICARE

## 2020-06-06 ENCOUNTER — APPOINTMENT (OUTPATIENT)
Dept: GENERAL RADIOLOGY | Age: 80
DRG: 331 | End: 2020-06-06
Attending: SURGERY
Payer: MEDICARE

## 2020-06-06 LAB
ANION GAP SERPL CALCULATED.3IONS-SCNC: 8 MEQ/L (ref 8–16)
BUN BLDV-MCNC: 11 MG/DL (ref 7–22)
CALCIUM SERPL-MCNC: 8.7 MG/DL (ref 8.5–10.5)
CHLORIDE BLD-SCNC: 105 MEQ/L (ref 98–111)
CO2: 24 MEQ/L (ref 23–33)
CREAT SERPL-MCNC: 0.7 MG/DL (ref 0.4–1.2)
EKG ATRIAL RATE: 68 BPM
EKG P AXIS: 42 DEGREES
EKG P-R INTERVAL: 170 MS
EKG Q-T INTERVAL: 394 MS
EKG QRS DURATION: 102 MS
EKG QTC CALCULATION (BAZETT): 418 MS
EKG R AXIS: -54 DEGREES
EKG T AXIS: 43 DEGREES
EKG VENTRICULAR RATE: 68 BPM
GFR SERPL CREATININE-BSD FRML MDRD: 81 ML/MIN/1.73M2
GLUCOSE BLD-MCNC: 103 MG/DL (ref 70–108)
HCT VFR BLD CALC: 38.5 % (ref 37–47)
HEMOGLOBIN: 12 GM/DL (ref 12–16)
POTASSIUM SERPL-SCNC: 4.4 MEQ/L (ref 3.5–5.2)
SODIUM BLD-SCNC: 137 MEQ/L (ref 135–145)
TROPONIN T: < 0.01 NG/ML
TROPONIN T: < 0.01 NG/ML

## 2020-06-06 PROCEDURE — 93005 ELECTROCARDIOGRAM TRACING: CPT | Performed by: SURGERY

## 2020-06-06 PROCEDURE — 2580000003 HC RX 258: Performed by: NURSE PRACTITIONER

## 2020-06-06 PROCEDURE — 6360000004 HC RX CONTRAST MEDICATION: Performed by: NURSE PRACTITIONER

## 2020-06-06 PROCEDURE — 85014 HEMATOCRIT: CPT

## 2020-06-06 PROCEDURE — 2500000003 HC RX 250 WO HCPCS: Performed by: SURGERY

## 2020-06-06 PROCEDURE — 99024 POSTOP FOLLOW-UP VISIT: CPT | Performed by: SURGERY

## 2020-06-06 PROCEDURE — 1200000000 HC SEMI PRIVATE

## 2020-06-06 PROCEDURE — 84484 ASSAY OF TROPONIN QUANT: CPT

## 2020-06-06 PROCEDURE — 85018 HEMOGLOBIN: CPT

## 2020-06-06 PROCEDURE — 80048 BASIC METABOLIC PNL TOTAL CA: CPT

## 2020-06-06 PROCEDURE — 71275 CT ANGIOGRAPHY CHEST: CPT

## 2020-06-06 PROCEDURE — 36415 COLL VENOUS BLD VENIPUNCTURE: CPT

## 2020-06-06 PROCEDURE — 71045 X-RAY EXAM CHEST 1 VIEW: CPT

## 2020-06-06 PROCEDURE — 6360000002 HC RX W HCPCS: Performed by: SURGERY

## 2020-06-06 PROCEDURE — 2580000003 HC RX 258: Performed by: SURGERY

## 2020-06-06 PROCEDURE — 6370000000 HC RX 637 (ALT 250 FOR IP): Performed by: SURGERY

## 2020-06-06 RX ADMIN — PRAMIPEXOLE DIHYDROCHLORIDE 0.5 MG: 0.25 TABLET ORAL at 21:59

## 2020-06-06 RX ADMIN — SODIUM CHLORIDE: 9 INJECTION, SOLUTION INTRAVENOUS at 15:19

## 2020-06-06 RX ADMIN — HYDROCODONE BITARTRATE AND ACETAMINOPHEN 1 TABLET: 5; 325 TABLET ORAL at 16:30

## 2020-06-06 RX ADMIN — ENOXAPARIN SODIUM 40 MG: 40 INJECTION SUBCUTANEOUS at 08:23

## 2020-06-06 RX ADMIN — PRAMIPEXOLE DIHYDROCHLORIDE 0.5 MG: 0.25 TABLET ORAL at 08:23

## 2020-06-06 RX ADMIN — SODIUM CHLORIDE: 9 INJECTION, SOLUTION INTRAVENOUS at 03:01

## 2020-06-06 RX ADMIN — FAMOTIDINE 20 MG: 10 INJECTION, SOLUTION INTRAVENOUS at 08:23

## 2020-06-06 RX ADMIN — HYDROCODONE BITARTRATE AND ACETAMINOPHEN 1 TABLET: 5; 325 TABLET ORAL at 01:34

## 2020-06-06 RX ADMIN — FAMOTIDINE 20 MG: 10 INJECTION, SOLUTION INTRAVENOUS at 21:59

## 2020-06-06 RX ADMIN — LEVOTHYROXINE SODIUM 50 MCG: 50 TABLET ORAL at 07:15

## 2020-06-06 RX ADMIN — HYDROCODONE BITARTRATE AND ACETAMINOPHEN 1 TABLET: 5; 325 TABLET ORAL at 12:35

## 2020-06-06 RX ADMIN — Medication 10 ML: at 08:23

## 2020-06-06 RX ADMIN — IOPAMIDOL 80 ML: 755 INJECTION, SOLUTION INTRAVENOUS at 14:48

## 2020-06-06 RX ADMIN — HYDROCODONE BITARTRATE AND ACETAMINOPHEN 1 TABLET: 5; 325 TABLET ORAL at 08:30

## 2020-06-06 ASSESSMENT — PAIN DESCRIPTION - FREQUENCY
FREQUENCY: CONTINUOUS
FREQUENCY: CONTINUOUS

## 2020-06-06 ASSESSMENT — PAIN DESCRIPTION - DESCRIPTORS
DESCRIPTORS: RADIATING;PRESSURE
DESCRIPTORS: RADIATING

## 2020-06-06 ASSESSMENT — PAIN SCALES - GENERAL
PAINLEVEL_OUTOF10: 4
PAINLEVEL_OUTOF10: 7
PAINLEVEL_OUTOF10: 7
PAINLEVEL_OUTOF10: 4
PAINLEVEL_OUTOF10: 5
PAINLEVEL_OUTOF10: 8
PAINLEVEL_OUTOF10: 8
PAINLEVEL_OUTOF10: 4
PAINLEVEL_OUTOF10: 5

## 2020-06-06 ASSESSMENT — PAIN DESCRIPTION - ORIENTATION
ORIENTATION: LEFT;UPPER
ORIENTATION: MID;LEFT

## 2020-06-06 ASSESSMENT — PAIN DESCRIPTION - LOCATION
LOCATION: ARM
LOCATION: ARM;CHEST;SHOULDER

## 2020-06-06 ASSESSMENT — PAIN DESCRIPTION - PAIN TYPE
TYPE: ACUTE PAIN
TYPE: ACUTE PAIN

## 2020-06-06 ASSESSMENT — PAIN DESCRIPTION - ONSET
ONSET: GRADUAL
ONSET: ON-GOING

## 2020-06-06 NOTE — PLAN OF CARE
Problem: Falls - Risk of:  Goal: Will remain free from falls  Description: Will remain free from falls  6/6/2020 0310 by Katja Hollingsworth RN  Note: Patient free from falls this shift. Non skid socks on. Bed alarm on, bed in low position with wheels locked. Call light in reach and instructed to use when assistance is needed to go to the bathroom. Problem: Pain:  Goal: Pain level will decrease  Description: Pain level will decrease  6/6/2020 0321 by Katja Hollingsworth RN  Outcome: Ongoing  Note: Cold therapy/ice pack used this shift. Prn Norco given for pain. Problem: Discharge Planning:  Goal: Discharged to appropriate level of care  Description: Discharged to appropriate level of care  6/6/2020 0321 by Katja Hollingsworth RN  Outcome: Ongoing  Note: Patient plans to discharge home at a later date. No needs or concerns for discharge voiced this shift. Problem: Bowel Function - Altered:  Goal: Bowel elimination is within specified parameters  Description: Bowel elimination is within specified parameters  6/6/2020 0321 by Katja Hollingsworth RN  Outcome: Ongoing  Note: Patient is passing gas occasionally and belching. Hypoactive bowels and no BM this shift. Problem: Infection - Surgical Site:  Goal: Will show no infection signs and symptoms  Description: Will show no infection signs and symptoms  6/6/2020 0321 by Katja Hollingsworth RN  Outcome: Ongoing  Note: No signs or symptoms of infection noted this shift. Problem: Venous Thromboembolism:  Goal: Will show no signs or symptoms of venous thromboembolism  Description: Will show no signs or symptoms of venous thromboembolism  Outcome: Ongoing  Note: No signs or symptoms of DVT noted this shift. SCD's on while in bed. Care plan reviewed with patient. Patient verbalizes understanding of the plan of care and contributes to goal setting.

## 2020-06-06 NOTE — PROGRESS NOTES
[facility]  Postoperative Progress Note    Pt Name: Sixto Up Record Number: [id number]  Date of Birth 1940   Today's Date: 6/6/2020    ASSESSMENT   1. POD # 2 Status post robotic right colectomy   2. Unresectable ascending colon/hepatic flexure polyp/mass  3. Hypothyroidism   4. Inspiratory pain  5. Hx of DVT   has a past medical history of Diverticulosis, Fatty liver, GERD (gastroesophageal reflux disease), Hiatal hernia, Hx of blood clots, Hyperlipidemia, Hypertension, Hypothyroidism, Incontinence, Osteoarthritis, Parkinson's disease ([address] PONV (postoperative nausea and vomiting), Prolonged emergence from general anesthesia, Stress fracture, Tubular adenoma, and Vertigo. PLAN   1. Full liquids. Await bowel function. 2. Pain control  3. Resume home medications  4. Up as tolerated  5. Encouraged incentive spirometer  6. Pathology pending   7. CT PE protocol   8. troponin  SUBJECTIVE   Patient was stable overnight. She is on supplemental oxygen for poor respiratory drive. She says her pain is controlled, she complains only of chest and retrosternal pain with deep inspiration. She has had a DVT in the past.  She has not passed flatus or had a bowel movement.   CURRENT MEDICATIONS   Scheduled Meds:   levothyroxine  50 mcg Oral Daily    pramipexole  0.5 mg Oral BID    metroNIDAZOLE  500 mg Intravenous 60 Min Pre-Op    scopolamine  1 patch Transdermal Once    sodium chloride flush  10 mL Intravenous 2 times per day    enoxaparin  40 mg Subcutaneous Daily    famotidine (PEPCID) injection  20 mg Intravenous BID     Continuous Infusions:   sodium chloride 75 mL/hr at 06/06/20 0301     PRN Meds:.acetaminophen, sodium chloride flush, promethazine **OR** ondansetron, morphine **OR** morphine, HYDROcodone 5 mg - acetaminophen **OR** HYDROcodone 5 mg - acetaminophen  OBJECTIVE   CURRENT VITALS:  height is 5' (1.524 m) and weight is 175 lb 9.6 oz (79.7 kg). Her oral temperature is 99.6 °F (37.6 °C). Her blood pressure is 114/48 (abnormal) and her pulse is 67. Her respiration is 20 and oxygen saturation is 95%. Temperature Range (24h):Temp: 99.6 °F (37.6 °C) Temp  Av.7 °F (37.1 °C)  Min: 97.8 °F (36.6 °C)  Max: 99.6 °F (37.6 °C)  BP Range (47F): Systolic (28FCQ), HIA:809 , Min:114 , OOE:330     Diastolic (60YHV), KMO:01, Min:48, Max:64    Pulse Range (24h): Pulse  Av.4  Min: 57  Max: 74  Respiration Range (24h): Resp  Av.4  Min: 16  Max: 20  Current Pulse Ox (24h):  SpO2: 95 %  Pulse Ox Range (24h):  SpO2  Av.2 %  Min: 89 %  Max: 95 %  Oxygen Amount and Delivery: O2 Flow Rate (L/min): 2 L/min  Incentive Spirometry Tx:            GENERAL: alert, no distress  LUNGS: Air movement bilaterally, splinting with deep inspiration. HEART: normal rate  ABDOMEN: softly distended, incisional tenderness, bowel sounds hypoactive  INCISION: healing well, no significant drainage, no significant erythema  EXTERMITY: no cyanosis, clubbing or edema  In: 2847.7 [P.O.:1160; I.V.:1687.7]  Out: 2800 [Urine:2800]  Date 20 0000 - 20 2359   Shift 4540-7850 2580-3245 1007-7973 24 Hour Total   INTAKE   P.O.(mL/kg/hr) 400(0.6)   400   I. V.(mL/kg) 734. 6(9.2)   734. 6(9.2)   Shift Total(mL/kg) 1134. 6(14.2)   1134. 6(14.2)   OUTPUT   Urine(mL/kg/hr) 1000(1.6) 900  1900   Shift Total(mL/kg) 1000(12.6) 900(11.3)  1900(23.9)   Weight (kg) 79.7 79.7 79.7 79.7     LABS     Recent Labs     20  0507 20  0540   HGB 11.5* 12.0   HCT 36.4* 38.5    137   K 4.7 4.4    105   CO2 20* 24   BUN 13 11   CREATININE 0.8 0.7   CALCIUM 8.3* 8.7      Pathology pending   RADIOLOGY   No new imaging    Electronically signed by Shaneka Escalera MD on 2020 at 11:43 AM      Electronically signed by Ingrid Kaba MD on 20 at 11:07 AM EDT

## 2020-06-06 NOTE — FLOWSHEET NOTE
06/06/20 1524   Provider Notification   Reason for Communication Review case  (CTA results)   Provider Name [address]   Provider Notification Physician Assistant   Method of Communication Secure Message   Response Waiting for response   Notification Time 1524     1.  No pulmonary emboli are seen. 2. Moderate atelectasis/pneumonia both mid and lower lung fields.

## 2020-06-07 ENCOUNTER — APPOINTMENT (OUTPATIENT)
Dept: GENERAL RADIOLOGY | Age: 80
DRG: 331 | End: 2020-06-07
Attending: SURGERY
Payer: MEDICARE

## 2020-06-07 LAB
ERYTHROCYTE [DISTWIDTH] IN BLOOD BY AUTOMATED COUNT: 13.2 % (ref 11.5–14.5)
ERYTHROCYTE [DISTWIDTH] IN BLOOD BY AUTOMATED COUNT: 46.9 FL (ref 35–45)
HCT VFR BLD CALC: 33.8 % (ref 37–47)
HEMOGLOBIN: 10.7 GM/DL (ref 12–16)
MCH RBC QN AUTO: 30.3 PG (ref 26–33)
MCHC RBC AUTO-ENTMCNC: 31.7 GM/DL (ref 32.2–35.5)
MCV RBC AUTO: 95.8 FL (ref 81–99)
PLATELET # BLD: 148 THOU/MM3 (ref 130–400)
PMV BLD AUTO: 10.4 FL (ref 9.4–12.4)
RBC # BLD: 3.53 MILL/MM3 (ref 4.2–5.4)
WBC # BLD: 8.3 THOU/MM3 (ref 4.8–10.8)

## 2020-06-07 PROCEDURE — 99024 POSTOP FOLLOW-UP VISIT: CPT | Performed by: SURGERY

## 2020-06-07 PROCEDURE — 85027 COMPLETE CBC AUTOMATED: CPT

## 2020-06-07 PROCEDURE — APPSS60 APP SPLIT SHARED TIME 46-60 MINUTES: Performed by: PHYSICIAN ASSISTANT

## 2020-06-07 PROCEDURE — 1200000000 HC SEMI PRIVATE

## 2020-06-07 PROCEDURE — 87205 SMEAR GRAM STAIN: CPT

## 2020-06-07 PROCEDURE — 94669 MECHANICAL CHEST WALL OSCILL: CPT

## 2020-06-07 PROCEDURE — 36415 COLL VENOUS BLD VENIPUNCTURE: CPT

## 2020-06-07 PROCEDURE — 6360000002 HC RX W HCPCS: Performed by: SURGERY

## 2020-06-07 PROCEDURE — 87070 CULTURE OTHR SPECIMN AEROBIC: CPT

## 2020-06-07 PROCEDURE — 2580000003 HC RX 258: Performed by: SURGERY

## 2020-06-07 PROCEDURE — 2500000003 HC RX 250 WO HCPCS: Performed by: SURGERY

## 2020-06-07 PROCEDURE — 2700000000 HC OXYGEN THERAPY PER DAY

## 2020-06-07 PROCEDURE — 2580000003 HC RX 258: Performed by: NURSE PRACTITIONER

## 2020-06-07 PROCEDURE — 94760 N-INVAS EAR/PLS OXIMETRY 1: CPT

## 2020-06-07 PROCEDURE — 6370000000 HC RX 637 (ALT 250 FOR IP): Performed by: SURGERY

## 2020-06-07 RX ADMIN — PRAMIPEXOLE DIHYDROCHLORIDE 0.5 MG: 0.25 TABLET ORAL at 19:46

## 2020-06-07 RX ADMIN — HYDROCODONE BITARTRATE AND ACETAMINOPHEN 1 TABLET: 5; 325 TABLET ORAL at 02:52

## 2020-06-07 RX ADMIN — FAMOTIDINE 20 MG: 10 INJECTION, SOLUTION INTRAVENOUS at 19:44

## 2020-06-07 RX ADMIN — HYDROCODONE BITARTRATE AND ACETAMINOPHEN 1 TABLET: 5; 325 TABLET ORAL at 13:33

## 2020-06-07 RX ADMIN — ONDANSETRON 4 MG: 2 INJECTION INTRAMUSCULAR; INTRAVENOUS at 09:28

## 2020-06-07 RX ADMIN — LEVOTHYROXINE SODIUM 50 MCG: 50 TABLET ORAL at 06:43

## 2020-06-07 RX ADMIN — ENOXAPARIN SODIUM 40 MG: 40 INJECTION SUBCUTANEOUS at 08:05

## 2020-06-07 RX ADMIN — HYDROCODONE BITARTRATE AND ACETAMINOPHEN 1 TABLET: 5; 325 TABLET ORAL at 18:35

## 2020-06-07 RX ADMIN — SODIUM CHLORIDE: 9 INJECTION, SOLUTION INTRAVENOUS at 15:54

## 2020-06-07 RX ADMIN — Medication 10 ML: at 08:07

## 2020-06-07 RX ADMIN — FAMOTIDINE 20 MG: 10 INJECTION, SOLUTION INTRAVENOUS at 08:07

## 2020-06-07 RX ADMIN — PRAMIPEXOLE DIHYDROCHLORIDE 0.5 MG: 0.25 TABLET ORAL at 08:08

## 2020-06-07 RX ADMIN — Medication 10 ML: at 19:45

## 2020-06-07 RX ADMIN — SODIUM CHLORIDE: 9 INJECTION, SOLUTION INTRAVENOUS at 02:38

## 2020-06-07 ASSESSMENT — PAIN SCALES - GENERAL
PAINLEVEL_OUTOF10: 4
PAINLEVEL_OUTOF10: 1
PAINLEVEL_OUTOF10: 4
PAINLEVEL_OUTOF10: 1
PAINLEVEL_OUTOF10: 1
PAINLEVEL_OUTOF10: 4

## 2020-06-07 ASSESSMENT — PAIN DESCRIPTION - LOCATION: LOCATION: CHEST

## 2020-06-07 ASSESSMENT — PAIN DESCRIPTION - PAIN TYPE: TYPE: ACUTE PAIN

## 2020-06-07 ASSESSMENT — PAIN DESCRIPTION - FREQUENCY: FREQUENCY: INTERMITTENT

## 2020-06-07 ASSESSMENT — PAIN DESCRIPTION - DESCRIPTORS: DESCRIPTORS: SORE;ACHING

## 2020-06-07 ASSESSMENT — PAIN DESCRIPTION - ORIENTATION: ORIENTATION: MID;UPPER

## 2020-06-07 ASSESSMENT — PAIN DESCRIPTION - ONSET: ONSET: ON-GOING

## 2020-06-07 NOTE — PROGRESS NOTES
Irma Carrera covering for Dr. Cristóbal Bashir  Postoperative Progress Note    Pt Name: Greg Pradhan Record Number: [id number]  Date of Birth 1940   Today's Date: 6/7/2020  ASSESSMENT   1. POD # 3 Status post robotic right colectomy   2. Unresectable ascending colon/hepatic flexure polyp/mass  3. Hypothyroidism   4. Inspiratory pain, resolved  5. Hx of DVT   has a past medical history of Diverticulosis, Fatty liver, GERD (gastroesophageal reflux disease), Hiatal hernia, Hx of blood clots, Hyperlipidemia, Hypertension, Hypothyroidism, Incontinence, Osteoarthritis, Parkinson's disease ([facility] [address] PONV (postoperative nausea and vomiting), Prolonged emergence from general anesthesia, Stress fracture, Tubular adenoma, and Vertigo. PLAN   1. Full liquids  2. Pain control  3. Home medications resumed  4. Up as tolerated  5. Pathology pending  6. Respiratory cultures  7. Respiratory care evaluation and treatment  8. Aggressive incentive spirometry  9. Acapella  SUBJECTIVE   Patient was stable overnight. Patient has been on up to 4L O2 inpatient for poor respiratory drive. O2 was weaned earlier today but patient is back on 2L presently. Patient is not on O2 at home. She says her pain is controlled. Patient was complaining of chest pain yesterday which has since resolved. CXR, CTA chest, trop were ordered and evaluated yesterday with no acute finding. No PE. She has had a DVT in the past. Patient tolerating full liquid diet.   She has had a BM in the hospital.   CURRENT MEDICATIONS   Scheduled Meds:   levothyroxine  50 mcg Oral Daily    pramipexole  0.5 mg Oral BID    metroNIDAZOLE  500 mg Intravenous 60 Min Pre-Op    scopolamine  1 patch Transdermal Once    sodium chloride flush  10 mL Intravenous 2 times per day    enoxaparin  40 mg Subcutaneous Daily    famotidine (PEPCID) injection  20 mg Intravenous BID     Continuous Infusions:   sodium chloride 75 mL/hr at 06/07/20 0238     PRN Meds:.acetaminophen, sodium chloride flush, promethazine **OR** ondansetron, morphine **OR** morphine, HYDROcodone 5 mg - acetaminophen **OR** HYDROcodone 5 mg - acetaminophen  OBJECTIVE   CURRENT VITALS:  height is 5' (1.524 m) and weight is 175 lb 9.6 oz (79.7 kg). Her oral temperature is 99.3 °F (37.4 °C). Her blood pressure is 107/49 (abnormal) and her pulse is 66. Her respiration is 18 and oxygen saturation is 93%. Temperature Range (24h):Temp: 99.3 °F (37.4 °C) Temp  Av °F (37.2 °C)  Min: 98 °F (36.7 °C)  Max: 99.6 °F (37.6 °C)  BP Range (35V): Systolic (42TGP), ECO:586 , Min:107 , FHD:428     Diastolic (26JLK), VMD:57, Min:43, Max:62    Pulse Range (24h): Pulse  Av.5  Min: 66  Max: 75  Respiration Range (24h): Resp  Av  Min: 16  Max: 20  Current Pulse Ox (24h):  SpO2: 93 %  Pulse Ox Range (24h):  SpO2  Av.3 %  Min: 92 %  Max: 97 %  Oxygen Amount and Delivery: O2 Flow Rate (L/min): 2 L/min  Incentive Spirometry Tx:     Incentive Spirometry Goal (mL): 1000 mL Incentive Spirometry Achieved (mL): 300 mL    GENERAL: Alert, sitting upright in chair. NAD. LUNGS: Lungs clear to auscultation bilaterally with normal work of breathing. HEART: RRR. Peripheral pulses intact. ABDOMEN: Softly distended, incisional tenderness, bowel sounds hypoactive. INCISION: Healing well, no significant drainage, no significant erythema. EXTERMITY: No cyanosis, clubbing or edema. In: 1008.7 [P.O.:400;  I.V.:608.7]  Out: 1525 [Urine:1525]  Date 20 0000 - 20   Shift 4706-1825 5086-2811 4502-4713 24 Hour Total   INTAKE   Shift Total(mL/kg)       OUTPUT   Urine(mL/kg/hr) 400(0.6) 325  725   Shift Total(mL/kg) 400(5) 325(4.1)  725(9.1)   Weight (kg) 79.7 79.7 79.7 79.7     LABS     Recent Labs     20  0507 20  0540 20  0607   WBC  --   --  8.3   HGB 11.5* 12.0 10.7*   HCT 36.4* 38.5 33.8*   PLT  --   --  148    137  --    K 4.7 4.4  --     105  --    CO2 20* 24  --    BUN 13 11  --    CREATININE 0.8 0.7  --    CALCIUM 8.3* 8.7  --       Pathology pending   RADIOLOGY   No new imaging      Electronically signed by César Carranza PA-C on 6/7/2020 at 8:46 AM     Patient seen and evaluated independently on the floor. Some distention. She really does not want more to eat at this point. Encourage activity. And pulmonary hygiene. Agree as documented.

## 2020-06-07 NOTE — PLAN OF CARE
Problem: Falls - Risk of:  Goal: Will remain free from falls  Description: Will remain free from falls  Outcome: Ongoing  Note: Free from falls. Up with 1 assist. Non skid slippers worn. Hourly rounding continued. Call light within reach. Problem: Pain:  Goal: Pain level will decrease  Description: Pain level will decrease  Outcome: Ongoing  Note: Pain 1/10 this morning. Norco 1 tab given prn. Ice pack prn. Abdominal binder in place. Problem: Discharge Planning:  Goal: Discharged to appropriate level of care  Description: Discharged to appropriate level of care  Outcome: Ongoing  Note: Care plan reviewed with patient and she verbalizes understanding of the plan of care and contributes to goal setting. Patient lives home with  and also has family support. Problem: Bowel Function - Altered:  Goal: Bowel elimination is within specified parameters  Description: Bowel elimination is within specified parameters  Outcome: Ongoing  Note: Denies nausea. Tolerating full liquid diet. Had 1 small BM yesterday evening and another overnight. Abdomen softly distended. Hypo bowel sounds. Problem: Fluid Volume - Imbalance:  Goal: Absence of imbalanced fluid volume signs and symptoms  Description: Absence of imbalanced fluid volume signs and symptoms  Outcome: Ongoing  Note: 0.9NS infusing at 75ml/hr. Voids clear yellow urine, about 300ml each void. Problem: Infection - Surgical Site:  Goal: Will show no infection signs and symptoms  Description: Will show no infection signs and symptoms  Outcome: Ongoing  Note: Bandaids removed from surgical site, steri strips remains intact. Oral temp 99.3   WBC  8.3  Will have chlorhexidine bath today. Problem: Venous Thromboembolism:  Goal: Will show no signs or symptoms of venous thromboembolism  Description: Will show no signs or symptoms of venous thromboembolism  Outcome: Ongoing  Note: No sign of DVT. Lovenox given. SCD's worn while in bed.

## 2020-06-08 ENCOUNTER — APPOINTMENT (OUTPATIENT)
Dept: GENERAL RADIOLOGY | Age: 80
DRG: 331 | End: 2020-06-08
Attending: SURGERY
Payer: MEDICARE

## 2020-06-08 PROCEDURE — APPSS30 APP SPLIT SHARED TIME 16-30 MINUTES: Performed by: NURSE PRACTITIONER

## 2020-06-08 PROCEDURE — 94640 AIRWAY INHALATION TREATMENT: CPT

## 2020-06-08 PROCEDURE — 94669 MECHANICAL CHEST WALL OSCILL: CPT

## 2020-06-08 PROCEDURE — 2500000003 HC RX 250 WO HCPCS: Performed by: SURGERY

## 2020-06-08 PROCEDURE — 6360000002 HC RX W HCPCS: Performed by: SURGERY

## 2020-06-08 PROCEDURE — 2580000003 HC RX 258: Performed by: SURGERY

## 2020-06-08 PROCEDURE — 99024 POSTOP FOLLOW-UP VISIT: CPT | Performed by: NURSE PRACTITIONER

## 2020-06-08 PROCEDURE — 94760 N-INVAS EAR/PLS OXIMETRY 1: CPT

## 2020-06-08 PROCEDURE — 6370000000 HC RX 637 (ALT 250 FOR IP): Performed by: NURSE PRACTITIONER

## 2020-06-08 PROCEDURE — 71045 X-RAY EXAM CHEST 1 VIEW: CPT

## 2020-06-08 PROCEDURE — 1200000000 HC SEMI PRIVATE

## 2020-06-08 PROCEDURE — 2580000003 HC RX 258: Performed by: NURSE PRACTITIONER

## 2020-06-08 RX ORDER — IPRATROPIUM BROMIDE AND ALBUTEROL SULFATE 2.5; .5 MG/3ML; MG/3ML
1 SOLUTION RESPIRATORY (INHALATION) EVERY 12 HOURS
Status: DISCONTINUED | OUTPATIENT
Start: 2020-06-08 | End: 2020-06-08

## 2020-06-08 RX ORDER — PANTOPRAZOLE SODIUM 40 MG/1
40 TABLET, DELAYED RELEASE ORAL
Status: DISCONTINUED | OUTPATIENT
Start: 2020-06-09 | End: 2020-06-09 | Stop reason: HOSPADM

## 2020-06-08 RX ORDER — IPRATROPIUM BROMIDE AND ALBUTEROL SULFATE 2.5; .5 MG/3ML; MG/3ML
1 SOLUTION RESPIRATORY (INHALATION)
Status: DISCONTINUED | OUTPATIENT
Start: 2020-06-08 | End: 2020-06-09 | Stop reason: HOSPADM

## 2020-06-08 RX ADMIN — FAMOTIDINE 20 MG: 10 INJECTION, SOLUTION INTRAVENOUS at 07:50

## 2020-06-08 RX ADMIN — ENOXAPARIN SODIUM 40 MG: 40 INJECTION SUBCUTANEOUS at 07:49

## 2020-06-08 RX ADMIN — IPRATROPIUM BROMIDE AND ALBUTEROL SULFATE 1 AMPULE: .5; 3 SOLUTION RESPIRATORY (INHALATION) at 11:30

## 2020-06-08 RX ADMIN — PRAMIPEXOLE DIHYDROCHLORIDE 0.5 MG: 0.25 TABLET ORAL at 07:51

## 2020-06-08 RX ADMIN — Medication 10 ML: at 07:50

## 2020-06-08 RX ADMIN — PRAMIPEXOLE DIHYDROCHLORIDE 0.5 MG: 0.25 TABLET ORAL at 19:34

## 2020-06-08 RX ADMIN — LEVOTHYROXINE SODIUM 50 MCG: 50 TABLET ORAL at 06:31

## 2020-06-08 RX ADMIN — SODIUM CHLORIDE: 9 INJECTION, SOLUTION INTRAVENOUS at 03:50

## 2020-06-08 RX ADMIN — IPRATROPIUM BROMIDE AND ALBUTEROL SULFATE 1 AMPULE: .5; 3 SOLUTION RESPIRATORY (INHALATION) at 18:11

## 2020-06-08 ASSESSMENT — PAIN DESCRIPTION - PROGRESSION

## 2020-06-08 ASSESSMENT — PAIN DESCRIPTION - ORIENTATION
ORIENTATION: MID
ORIENTATION: MID

## 2020-06-08 ASSESSMENT — PAIN SCALES - GENERAL
PAINLEVEL_OUTOF10: 1
PAINLEVEL_OUTOF10: 1

## 2020-06-08 ASSESSMENT — PAIN DESCRIPTION - DESCRIPTORS
DESCRIPTORS: CRAMPING
DESCRIPTORS: CRAMPING

## 2020-06-08 ASSESSMENT — PAIN DESCRIPTION - FREQUENCY
FREQUENCY: INTERMITTENT
FREQUENCY: INTERMITTENT

## 2020-06-08 ASSESSMENT — PAIN - FUNCTIONAL ASSESSMENT
PAIN_FUNCTIONAL_ASSESSMENT: ACTIVITIES ARE NOT PREVENTED
PAIN_FUNCTIONAL_ASSESSMENT: PREVENTS OR INTERFERES SOME ACTIVE ACTIVITIES AND ADLS

## 2020-06-08 ASSESSMENT — PAIN DESCRIPTION - PAIN TYPE
TYPE: ACUTE PAIN
TYPE: ACUTE PAIN

## 2020-06-08 ASSESSMENT — PAIN DESCRIPTION - LOCATION
LOCATION: ABDOMEN
LOCATION: ABDOMEN

## 2020-06-08 ASSESSMENT — PAIN DESCRIPTION - ONSET
ONSET: ON-GOING
ONSET: ON-GOING

## 2020-06-08 NOTE — PLAN OF CARE
Problem: Falls - Risk of:  Goal: Will remain free from falls  Description: Will remain free from falls  6/8/2020 0938 by Krissy Rosado RN  Outcome: Met This Shift  Note: No falls this shift. Safety interventions maintained. 6/7/2020 2127 by Yenni Khan RN  Outcome: Ongoing  Note: Patient free from falls this shift. Gait steady with 1 assist. Alert and oriented x 4, using call light appropriately. Problem: Bowel Function - Altered:  Goal: Bowel elimination is within specified parameters  Description: Bowel elimination is within specified parameters  6/8/2020 0940 by Krissy Rosado RN  Outcome: Met This Shift  Note: Patient is having loose, watery bowel movements   6/7/2020 2127 by Yenni Khan RN  Outcome: Ongoing  Note: Abdomen soft/tender, bowel sounds active. Patient is passing gas, medium watery bowel movement this shift. Patient complaining of cramping.       Problem: Fluid Volume - Imbalance:  Goal: Absence of imbalanced fluid volume signs and symptoms  Description: Absence of imbalanced fluid volume signs and symptoms  Outcome: Met This Shift  Note: Patient's fluids have been discontinued, she is drinking adequately      Problem: Infection - Surgical Site:  Goal: Will show no infection signs and symptoms  Description: Will show no infection signs and symptoms  6/8/2020 0940 by Krissy Rosado RN  Outcome: Met This Shift  Note: Patient shows no signs/symptoms of infection in surgical stab sites   6/7/2020 2127 by Yenni Khan RN  Outcome: Ongoing  Note: 6 abdominal surgical sites with steri strips/bandaids-clean, dry and intact     Problem: Venous Thromboembolism:  Goal: Will show no signs or symptoms of venous thromboembolism  Description: Will show no signs or symptoms of venous thromboembolism  6/8/2020 0940 by Krissy Rosado RN  Outcome: Met This Shift  Note: Patient is not showing any signs/symptoms of a dvt   6/7/2020 2127 by Yenni Khan RN  Outcome: Ongoing  Note: Patient

## 2020-06-08 NOTE — PROGRESS NOTES
[facility]  Postoperative Progress Note    Pt Name: Gurney Matters Record Number: [id number]  Date of Birth 1940   Today's Date: 6/8/2020  ASSESSMENT   1. POD # 4 Status post robotic right colectomy   2. Unresectable ascending colon/hepatic flexure polyp/mass  3. Hypothyroidism   4. Inspiratory pain, resolved  5. Hx of DVT   has a past medical history of Diverticulosis, Fatty liver, GERD (gastroesophageal reflux disease), Hiatal hernia, Hx of blood clots, Hyperlipidemia, Hypertension, Hypothyroidism, Incontinence, Osteoarthritis, Parkinson's disease ([facility] [address] PONV (postoperative nausea and vomiting), Prolonged emergence from general anesthesia, Stress fracture, Tubular adenoma, and Vertigo. PLAN   1. Tolerating full liquids. Okay for soft diet as tolerated. Bowel function returning. 2. Pain control  3. CTA negative  4. Up as tolerated - PT/OT consultation   5. Pathology pending  6. Respiratory culture no growth prelim. Pulmonary toileting. Duoneb every 6 hours. 7. Labs normal yesterday  8. DVT prophylaxis  9. Repeat chest x-ray today  10. Clinically, looks good. Low grade fevers. Supportive care. Will continue to monitor but hopefully home in the next 24-48 hours. SUBJECTIVE   Patient stable. She is off supplemental oxygen now. Still has productive cough. She says her pain is controlled. Tolerating full liquids. Had a BM yesterday. Urinating without issues. Weakness noted will have PT/OT see. Incisions look good.     CURRENT MEDICATIONS   Scheduled Meds:   ipratropium-albuterol  1 ampule Inhalation Q12H    levothyroxine  50 mcg Oral Daily    pramipexole  0.5 mg Oral BID    metroNIDAZOLE  500 mg Intravenous 60 Min Pre-Op    sodium chloride flush  10 mL Intravenous 2 times per day    enoxaparin  40 mg Subcutaneous Daily    famotidine (PEPCID) injection  20 mg Intravenous BID     Continuous Infusions:    PRN Meds:.acetaminophen, sodium chloride

## 2020-06-09 VITALS
HEIGHT: 60 IN | OXYGEN SATURATION: 91 % | WEIGHT: 175.6 LBS | TEMPERATURE: 98.1 F | DIASTOLIC BLOOD PRESSURE: 52 MMHG | SYSTOLIC BLOOD PRESSURE: 142 MMHG | BODY MASS INDEX: 34.47 KG/M2 | HEART RATE: 76 BPM | RESPIRATION RATE: 16 BRPM

## 2020-06-09 LAB
GRAM STAIN RESULT: NORMAL
RESPIRATORY CULTURE: NORMAL

## 2020-06-09 PROCEDURE — 99024 POSTOP FOLLOW-UP VISIT: CPT | Performed by: NURSE PRACTITIONER

## 2020-06-09 PROCEDURE — 97162 PT EVAL MOD COMPLEX 30 MIN: CPT

## 2020-06-09 PROCEDURE — 94760 N-INVAS EAR/PLS OXIMETRY 1: CPT

## 2020-06-09 PROCEDURE — 6370000000 HC RX 637 (ALT 250 FOR IP): Performed by: NURSE PRACTITIONER

## 2020-06-09 PROCEDURE — 97110 THERAPEUTIC EXERCISES: CPT

## 2020-06-09 PROCEDURE — 94640 AIRWAY INHALATION TREATMENT: CPT

## 2020-06-09 PROCEDURE — 6360000002 HC RX W HCPCS: Performed by: SURGERY

## 2020-06-09 PROCEDURE — 94669 MECHANICAL CHEST WALL OSCILL: CPT

## 2020-06-09 PROCEDURE — 6370000000 HC RX 637 (ALT 250 FOR IP): Performed by: PHYSICIAN ASSISTANT

## 2020-06-09 RX ORDER — HYDROCODONE BITARTRATE AND ACETAMINOPHEN 5; 325 MG/1; MG/1
1 TABLET ORAL EVERY 6 HOURS PRN
Qty: 10 TABLET | Refills: 0 | Status: SHIPPED | OUTPATIENT
Start: 2020-06-09 | End: 2020-06-16

## 2020-06-09 RX ADMIN — LEVOTHYROXINE SODIUM 50 MCG: 50 TABLET ORAL at 06:20

## 2020-06-09 RX ADMIN — ENOXAPARIN SODIUM 40 MG: 40 INJECTION SUBCUTANEOUS at 09:29

## 2020-06-09 RX ADMIN — IPRATROPIUM BROMIDE AND ALBUTEROL SULFATE 1 AMPULE: .5; 3 SOLUTION RESPIRATORY (INHALATION) at 07:53

## 2020-06-09 RX ADMIN — PANTOPRAZOLE SODIUM 40 MG: 40 TABLET, DELAYED RELEASE ORAL at 06:19

## 2020-06-09 RX ADMIN — PRAMIPEXOLE DIHYDROCHLORIDE 0.5 MG: 0.25 TABLET ORAL at 09:31

## 2020-06-09 ASSESSMENT — PAIN SCALES - GENERAL: PAINLEVEL_OUTOF10: 0

## 2020-06-09 NOTE — PROGRESS NOTES
[address] [address]  INPATIENT PHYSICAL THERAPY  EVALUATION  [facility] 6E-54/054-A    Time In: 9422  Time Out: 6517  Timed Code Treatment Minutes: 8 Minutes  Minutes: 24          Date: 2020  Patient Name: Amie Garay,  Gender:  female        MRN: [id number]  : 1940  (78 y.o.)      Referring Practitioner: Gisella Javier CNP  Diagnosis: Tubular adenoma  Additional Pertinent Hx: Pt s/p robotic R colectomy . Restrictions/Precautions:  Restrictions/Precautions: Fall Risk  Position Activity Restriction  Other position/activity restrictions: Monitor O2 sats    Subjective:  Chart Reviewed: Yes  Patient assessed for rehabilitation services?: Yes  Family / Caregiver Present: No  Subjective: RN approved session, pt is seated in recliner and agreeable. General:  Overall Orientation Status: Within Functional Limits  Follows Commands: Within Functional Limits    Vision: Within Functional Limits    Hearing: Within functional limits         Pain: denies    Social/Functional History:    Lives With: Spouse  Type of Home: House  Home Layout: One level  Home Access: Stairs to enter with rails  Entrance Stairs - Number of Steps: 6  Home Equipment: Cane, [employer] walker        Ambulation Assistance: Independent  Transfer Assistance: Independent    Active :  Yes     Additional Comments: Pt amb without AD, daughter planning on staying during the day, can stay at night if needed    OBJECTIVE:  Range of Motion:  Bilateral Lower Extremity: WFL    Strength:  Bilateral Lower Extremity: Impaired - grossly 4/5    Balance:  Static Standing Balance: Modified Independent  Dynamic Standing Balance: Stand By Assistance    Bed Mobility:  Not Tested    Transfers:  Sit to Stand: Stand By Assistance  Stand to Sit:Stand By Assistance    Ambulation:  Stand By Assistance  Distance: 250 feet  Surface: Level Tile  Device:No Device  Gait Deviations:  Slow Hallie and Decreased Gait Speed  **O2 sats 87% after gait on

## 2020-06-09 NOTE — DISCHARGE INSTR - ACTIVITY
As tolerated, no heavy lifting greater than 10 pounds, increase activity as tolerated, if taking medication for pain, do not drive, may shower, no tub baths until okayed by physician

## 2020-06-09 NOTE — PROGRESS NOTES
acetaminophen  OBJECTIVE   CURRENT VITALS:  height is 5' (1.524 m) and weight is 175 lb 9.6 oz (79.7 kg). Her oral temperature is 98.7 °F (37.1 °C). Her blood pressure is 146/55 (abnormal) and her pulse is 67. Her respiration is 16 and oxygen saturation is 90%. Temperature Range (24h):Temp: 98.7 °F (37.1 °C) Temp  Av.4 °F (37.4 °C)  Min: 98.7 °F (37.1 °C)  Max: 100.4 °F (38 °C)  BP Range (11P): Systolic (10NGV), SWM:725 , Min:119 , XBO:246     Diastolic (35QMX), JDH:86, Min:47, Max:55    Pulse Range (24h): Pulse  Av.3  Min: 65  Max: 75  Respiration Range (24h): Resp  Av  Min: 16  Max: 18  Current Pulse Ox (24h):  SpO2: 90 %  Pulse Ox Range (24h):  SpO2  Av.2 %  Min: 90 %  Max: 97 %  Oxygen Amount and Delivery: O2 Flow Rate (L/min): 2 L/min  Incentive Spirometry Tx:     Incentive Spirometry Goal (mL): 1000 mL Incentive Spirometry Achieved (mL): 300 mL    GENERAL: Alert, sitting upright in chair. NAD. LUNGS: Lungs clear to auscultation bilaterally, with normal work of breathing, diminished in bases  HEART: RRR. Peripheral pulses intact. ABDOMEN: Soft but distended, incisional tenderness, bowel sounds hypoactive. INCISION: Healing well, no significant drainage, no significant erythema. EXTERMITY: No cyanosis, clubbing or edema. In: 550 [P.O.:550]  Out: 1600 [Urine:1600]  Date 20 0000 - 20   Shift 7507-0741 2142-0136 0947-8169 24 Hour Total   INTAKE   P.O. 200   200   I. V.(mL/kg/hr) 0   0   Shift Total(mL/kg) 200(2.5)   200(2.5)   OUTPUT   Urine(mL/kg/hr) 300   300   Shift Total(mL/kg) 300(3.8)   300(3.8)   Weight (kg) 79.7 79.7 79.7 79.7     LABS     Recent Labs     20  0607   WBC 8.3   HGB 10.7*   HCT 33.8*         Pathology pending   RADIOLOGY     PROCEDURE: XR CHEST PORTABLE       CLINICAL INFORMATION: low grade fevers, atelectasis postoperative.       COMPARISON: 2020.       TECHNIQUE: AP upright view of the chest.       FINDINGS:   There is persistent left basilar consolidation, unchanged compared to prior exam. There are stable minimal patchy opacities at the right lung base. The cardiac silhouette is partially obscured, unchanged compared to prior exam. Visualized osseous    structures appear grossly intact.           Impression   Stable left basilar consolidation.                   **This report has been created using voice recognition software. It may contain minor errors which are inherent in voice recognition technology. **       Final report electronically signed by Dr. Sallie Mcallister MD on 6/8/2020 11:06 AM     Electronically signed by RISSA Latif CNP on 6/9/2020 at 7:54 AM     Patient seen and examined independently by me early this Laura. Above discussed and I agree with Kyle Erwin CNP. See my additional comments below for updated orders and plan. Labs, cultures, and radiographs where available were reviewed. I discussed patient concerns with the patient's nurse and instructions were given. Please see our orders for the updated patient care plan. -Tolerating diet. Bowel function returned. Strength better. Pulmonary toileting improved. Vital signs stable. Home today. Pathology reviewed. Follow-up in office.     Electronically signed by Lauren Wynne MD on 6/9/2020 at 4:58 PM

## 2020-06-09 NOTE — DISCHARGE SUMMARY
Karla Watson [address] [facility]       Pt Name: Arron Roberts  MRN: [id number]  YOB: 1940  Primary Care Physician: Rosario Anderson MD    Admit date:  6/4/2020  8:59 AM      Discharge date:  6/9/2020 11:27 AM    Admitting Diagnosis:   1. Unresectable ascending colon/hepatic flexure polyp/mass    Discharge Diagnosis:   1. Robotic right colectomy  2. Tubular adenoma    Admitting Service: General Surgery, Austin Delaney MD.    Consultants:  None    History and Physical:  Patient ID: Arron Roberts is a 78 y.o. female.           Chief Complaint   Patient presents with    Surgical Consult       new patient--ref by Dr. Jason Howard for large tubular adenoma-needs surgical resection      HPI   Clair Lesch is a 60-year-old female who I was asked to see in consultation secondary to an unresectable a sending colon polyp/mass. Biopsy demonstrated tubular adenoma. Dr. Jason Howard is concern for developing malignancy. Patient admits to right-sided abdominal discomfort. Mild bloating/distention. No generalized abdominal pain. Normal bowel function. No hematochezia or melena. No urinary complaints. Tolerating diet. No unexplained weight loss. No fever, chills or sweats. Patient admits to previous cholecystectomy and appendectomy. Denies any significant family history in regards to colorectal disease that she is aware of. She states she would like to proceed with resection if at all possible. Review of Systems   Constitutional: Negative for activity change, appetite change, chills, diaphoresis, fatigue, fever and unexpected weight change. HENT: Negative for congestion, dental problem, drooling, ear discharge, ear pain, facial swelling, hearing loss, mouth sores, nosebleeds, postnasal drip, rhinorrhea, sinus pressure, sneezing, sore throat, tinnitus, trouble swallowing and voice change.    Eyes: Negative for photophobia, pain, discharge, redness, itching Inability: Never true    Transportation needs       Medical: Not on file       Non-medical: Not on file   Tobacco Use    Smoking status: Former Smoker       Packs/day: 0.25       Years: 1.00       Pack years: 0.25       Types: Cigarettes       Last attempt to quit: 1959       Years since quittin.8    Smokeless tobacco: Never Used   Substance and Sexual Activity    Alcohol use: No       Alcohol/week: 0.0 standard drinks    Drug use: No    Sexual activity: Never   Lifestyle    Physical activity       Days per week: Not on file       Minutes per session: Not on file    Stress: Not on file   Relationships    Social connections       Talks on phone: Not on file       Gets together: Not on file       Attends Religion service: Not on file       Active member of club or organization: Not on file       Attends meetings of clubs or organizations: Not on file       Relationship status: Not on file    Intimate partner violence       Fear of current or ex partner: Not on file       Emotionally abused: Not on file       Physically abused: Not on file       Forced sexual activity: Not on file   Other Topics Concern    Not on file   Social History Narrative    Not on file            Vitals:     20 0916   BP: 138/72   Pulse:     Resp:     Temp:     SpO2:        Body mass index is 37.87 kg/m². Objective:   Physical Exam   Constitutional:   General: She is not in acute distress. Appearance: She is well-developed. She is not ill-appearing, toxic-appearing or diaphoretic. HENT:   Head: Normocephalic and atraumatic. Not macrocephalic and not microcephalic. No raccoon eyes, Kaba's sign, abrasion, contusion, right periorbital erythema, left periorbital erythema or laceration. Right Ear: External ear normal.   Left Ear: External ear normal.   Nose: Nose normal.   Mouth/Throat:   Pharynx: No oropharyngeal exudate. Eyes:   General: No scleral icterus. Right eye: No discharge. Left eye: No discharge.  05/07/2020     K 4.1 05/07/2020      05/07/2020     CO2 26 05/07/2020                Lab Results   Component Value Date     CREATININE 0.7 05/07/2020                Lab Results   Component Value Date     WBC 3.9 (L) 02/03/2020     HGB 13.6 02/03/2020     HCT 43.3 02/03/2020     MCV 94.3 02/03/2020      02/03/2020      Imaging -   Narrative   PROCEDURE: LIVER ULTRASOUND WITH DOPPLER ANALYSIS       CLINICAL INFORMATION: RUQ abdominal pain       TECHNIQUE: Multiple sonographic images of the upper abdomen were obtained with specific attention given to the liver. A few images of the pancreas, and right kidney were also obtained. Color Doppler imaging of the main vessels in and about the liver was    performed along with Spectral Doppler analysis.        FINDINGS:        Liver - L= 16.4 cm    Gallbladder - surgically removed    Common Duct - 1 cm           Liver: The liver is normal in size, contour, and echogenicity. No lesions are seen. No abnormally dilated bile ducts are seen. Color Doppler imaging of the major vessels in and about the liver and spectral Doppler waveforms reveal no abnormalities. Spectral Doppler waveforms right hepatic artery, hepatic veins, portal vein, and inferior vena cava are normal. Portal venous flow is hepatopedal.       pancreas, right kidney: Unremarkable.       Common bile duct: Normal in caliber. .       Gallbladder : Surgically absent.               Impression   Normal liver ultrasound with Doppler analysis.             **This report has been created using voice recognition software. It may contain minor errors which are inherent in voice recognition technology. **       Final report electronically signed by Dr. Luz Elena Pope on 2/7/2020 9:40 AM                 Patient Active Problem List   Diagnosis    Pure hypercholesterolemia    Vitamin D deficiency    Hypothyroidism    Osteopenia    Parkinson disease ([facility] [address]    Restless leg syndrome    Personal history of its root, heading around where the right branch of  the middle colic was taken and then the transverse colon, close to  midline was then taken with an [place] LUI 60 blue load. Omentum was  then divided. The more superior attachments were taken just inferior to  the stomach and then it continued laterally to meet the dissection there  at the most lateral aspect there at the hepatic flexure. At this point,  the specimen was then completely freed near the lateral end of natural  position until extraction time at the end of the case. The ileum was  then slightly mobilized further and this easily laid next to the  transverse colon. Its antimesenteric border was reapproximated to the  taenia of the transverse colon with an interrupted 3-0 Vicryl suture. 3  mL of Firefly had been given to ensure adequate blood flow, in which  there was to the ends of the transverse colon as well as the ileum. Enterotomies were then made both in the transverse colon and the ileum  with a monopolar scissors. [place] LUI 60 blue load was brought in,  closed, fired, and the anastomosis was formed. The enterotomy was then  closed with a running 3-0 V-Loc x2. Anastomosis appeared to be widely  patent. Adequate blood flow. Good hemostasis. No twisting or torsion  of the mesentery. Anastomosis was then covered with VISTASEAL to ensure  hemostasis and also to give it some added support. No other  abnormalities again were noted in the abdomen and a 5-mm trocar was then  placed there at the midline at the suprapubic region and grasped the  specimen with a grasper. Instruments otherwise were then removed, robot  undocked, and scrubbed back into the table. Small midline incision was  then made there at the suprapubic region around the 5-mm trocar site. Wound protector placed. Specimen removed. This was sent to pathology  for permanent. Wound protector twisted and clamped with a Jill and the  abdomen re-insufflated.   One last [id number]  Today's Date: 6/9/2020    GENERAL ANESTHESIA OR SEDATION  1. Do not drive or operate hazardous machinery for 24 hours. 2. Do not make important business or personal decisions for 24 hours. 3. Do not drink alcoholic beverages or use tobacco for 24 hours. ACTIVITY INSTRUCTIONS:  [] Rest today. Resume light to normal activity tomorrow.   [] You may resume normal activity tomorrow. Do not engage in strenuous activity that may place stress on your incision. [x] Do not drive for 3-5 days or while taking the pain medication. Avoid heavy lifting, tugging, pullings greater than 10 lbs until seen in the office. DIET INSTRUCTIONS:  [x]Begin with clear liquids. If not nauseated, may increase to a low-fat diet when you desire. Greasy and spicy foods are not advised. [x]Regular diet as tolerated. Smaller, more frequent meals. MEDICATIONS  [x]Prescription sent with you to be used as directed. []Lortab   [x]Norco   []Percocet   []Tylenol #3   []Oxycontin  Do not drink alcohol or drive while taking these medications. You may experience dizziness or drowsiness with these medications. You may also experience constipation which can be relieved with stool softners or laxatives. **Pain medication at discharge - use only as prescribed- refills may be available to you at your follow up appointments if needed and warranted. Narcotics should be used for only short term and we highly encouraged our patients to wean off appropriately and use other means for pain such as non pharmacologic measures and over the counter tylenol or ibuprofen if no restrictions apply. We do  know that surgical pain is real and will not hesitate to help eliminate some of your discomfort. However we will not be able to completely make you pain free and it is important to determine what pain level is tolerable for you **  [x]You may resume your daily prescription medication schedule unless otherwise specified.     Use Colace and MiraLAX asneeded to prevent constipation. Do not allow yourself to become constipated. Drink at least 64 ounces of liquids per day. WOUND/DRESSING INSTRUCTIONS:  Always ensure you and your care giver clean hands before and after caring for the wound. [] Keep dressing clean and dry for 48 hours. Change when soiled or wet. [x] Allow steri-strips to fall off on their own. [x] Ice operative site for 20 minutes 4 times a day as needed. [x] May wash over incision in shower, but do not soak in a bath.  [] Take sitz bath for 20 minutes twice daily and after bowel movements. [x] Keep the abdominal binder in place during the day. May remove to shower and at night. ABDOMINAL/LAPAROSCOPIC SURGERY  [x]You are encouraged to get up and move around as this helps with circulation, prevents blood clots from forming and speeds up the healing process. Call the office if you develop pain or swelling in your legs. Do not massage sore muscles in the legs. [x]Breath deeply and cough from time to time. This helps to clear your lungs and helps prevent pneumonia. [x]Supporting your incision with a pillow or your hand helps to minimize discomfort and pain. [x]Laparoscopic patients may develop shoulder pain in the first 48 hours from the gas used during the procedure a heating pad may help alleviate this discomfort. FOLLOW-UP CARE. SPECIFICALLY WATCH FOR:   Fever over 101 degrees by mouth   Increased redness, warmth, hardness at operative site. Blood soaked dressing (small amounts of oozing may be normal.)   Increased or progressive drainage from the surgical area   Inability to urinate or blood in the urine   Pain not relieved by the medications ordered   Persistent nausea and/or vomiting, unable to retain fluids. Pain or swelling in your legs. Shortness of breath. Call the office if you develop any of the above symptoms.      FOLLOW-UP APPOINTMENT   [x]1 week   []2 weeks:    []Other    Call my office if you have any problem that concerns you [phone]. After hours, you can reach the answering service via the office phone number. IF YOU NEED IMMEDIATE ATTENTION, GO TO THE EMERGENCY ROOM AND YOUR DOCTOR WILL BE CONTACTED. Prepared by Michelle Edouard CNP for   Sara Stanford MD [place] [address] [facility] [id number]    Discharge Medications:        Medication List      START taking these medications    HYDROcodone-acetaminophen 5-325 MG per tablet  Commonly known as:  NORCO  Take 1 tablet by mouth every 6 hours as needed for Pain for up to 7 days. CONTINUE taking these medications    ACETAMINOPHEN PO     levothyroxine 50 MCG tablet  Commonly known as:  SYNTHROID  TAKE 1 TABLET DAILY FOR LOW THYROID     NONFORMULARY     omeprazole 40 MG delayed release capsule  Commonly known as:  PRILOSEC     pramipexole 0.5 MG tablet  Commonly known as:  MIRAPEX  TAKE 1 TABLET TWICE A DAY     rosuvastatin 10 MG tablet  Commonly known as:  Crestor  Take 1 tablet by mouth nightly     Vitamin D3 50 MCG (2000 UT) Caps        STOP taking these medications    metroNIDAZOLE 500 MG tablet  Commonly known as:  FLAGYL     pravastatin 40 MG tablet  Commonly known as:  Pravachol           Where to Get Your Medications      These medications were sent to [facility] [address] ([place]), [place] - [address] [facility] [phone] Alexandro Vidales [phone]  [address] [place] [address]    Phone:  [phone]   · HYDROcodone-acetaminophen 5-325 MG per tablet       Follow-up:  in the next few weeks with Dustin Macias MD  Follow up with RISSA Stevenson CNP in 1 week.     Hali Garcia CNP   Electronincally signed 6/9/2020 at 3:51 PM    A total of 35 minutes was spent in preparing the patient for discharge with greater than 50% of the time involved with education, counseling and coordinating care

## 2020-06-09 NOTE — FLOWSHEET NOTE
06/08/20 1942   Provider Notification   Reason for Communication New orders   Provider Name [address]   Provider Notification Physician Assistant   Method of Communication Secure Message   Response See orders   Notification Time 1942   iv out, pepcid order changed to oral protonix

## 2020-06-10 ENCOUNTER — TELEPHONE (OUTPATIENT)
Dept: FAMILY MEDICINE CLINIC | Age: 80
End: 2020-06-10

## 2020-06-11 RX ORDER — PRAMIPEXOLE DIHYDROCHLORIDE 0.5 MG/1
TABLET ORAL
Qty: 180 TABLET | Refills: 3 | Status: SHIPPED | OUTPATIENT
Start: 2020-06-11 | End: 2020-07-20 | Stop reason: SDUPTHER

## 2020-06-11 NOTE — ADT AUTH CERT
(gastroesophageal reflux disease), Hiatal hernia, Hx of blood clots, Hyperlipidemia, Hypertension, Hypothyroidism, Incontinence, Osteoarthritis, Parkinson's disease ([address] PONV (postoperative nausea and vomiting), Prolonged emergence from general anesthesia, Stress fracture, Tubular adenoma, and Vertigo. PLAN   1. Full liquids   2. Pain control   3. Home medications resumed   4. Up as tolerated   5. Pathology pending   6. Respiratory cultures   7. Respiratory care evaluation and treatment   8. Aggressive incentive spirometry   9. Acapella      MEDS: lovenox sc x1, pepcid iv x2, synthroid po x1, mirapex po x2, ivf 75hr, norco po x3, zofran iv x1       6/8    Patient stable. She is off supplemental oxygen now. Still has productive cough. She says her pain is controlled. Tolerating full liquids. Had a BM yesterday. Urinating without issues. Weakness noted will have PT/OT see. Incisions look good.        CURRENT VITALS:  height is 5' (1.524 m) and weight is 175 lb 9.6 oz (79.7 kg). Her oral temperature is 99.2 °F (37.3 °C). Her blood pressure is 125/53 (abnormal) and her pulse is 65. Her respiration is 18 and oxygen saturation is 97%. ABDOMEN: Soft but distended, incisional tenderness, bowel sounds hypoactive. INCISION: Healing well, no significant drainage, no significant erythema. ASSESSMENT   1. POD # 4 Status post robotic right colectomy    2. Unresectable ascending colon/hepatic flexure polyp/mass   3. Hypothyroidism    4. Inspiratory pain, resolved   5. Hx of DVT    has a past medical history of Diverticulosis, Fatty liver, GERD (gastroesophageal reflux disease), Hiatal hernia, Hx of blood clots, Hyperlipidemia, Hypertension, Hypothyroidism, Incontinence, Osteoarthritis, Parkinson's disease ([address] PONV (postoperative nausea and vomiting), Prolonged emergence from general anesthesia, Stress fracture, Tubular adenoma, and Vertigo. PLAN   1. Tolerating full liquids. Okay for soft diet as tolerated.

## 2020-06-17 ENCOUNTER — OFFICE VISIT (OUTPATIENT)
Dept: SURGERY | Age: 80
End: 2020-06-17

## 2020-06-17 VITALS
DIASTOLIC BLOOD PRESSURE: 74 MMHG | HEART RATE: 76 BPM | BODY MASS INDEX: 33.77 KG/M2 | HEIGHT: 60 IN | TEMPERATURE: 97.2 F | OXYGEN SATURATION: 98 % | RESPIRATION RATE: 20 BRPM | SYSTOLIC BLOOD PRESSURE: 116 MMHG | WEIGHT: 172 LBS

## 2020-06-17 PROCEDURE — 99024 POSTOP FOLLOW-UP VISIT: CPT | Performed by: NURSE PRACTITIONER

## 2020-06-17 ASSESSMENT — ENCOUNTER SYMPTOMS
CONSTIPATION: 0
EYE DISCHARGE: 0
FACIAL SWELLING: 0
WHEEZING: 0
CHEST TIGHTNESS: 0
VOMITING: 0
RECTAL PAIN: 0
COLOR CHANGE: 0
DIARRHEA: 0
SINUS PRESSURE: 0
EYE PAIN: 0
CHOKING: 0
COUGH: 0
EYE REDNESS: 0
RHINORRHEA: 0
STRIDOR: 0
PHOTOPHOBIA: 0
TROUBLE SWALLOWING: 0
SORE THROAT: 0
EYE ITCHING: 0
APNEA: 0
VOICE CHANGE: 0
NAUSEA: 0
BLOOD IN STOOL: 0
SHORTNESS OF BREATH: 0
ABDOMINAL PAIN: 0
BACK PAIN: 0
ANAL BLEEDING: 0
ABDOMINAL DISTENTION: 0

## 2020-06-17 NOTE — PROGRESS NOTES
current facility-administered medications for this visit. Allergies   Allergen Reactions    Penicillins      Unsure of reaction, childhood    Lipitor [Atorvastatin] Other (See Comments)     Muscle aches       Subjective:     Review of Systems   Constitutional: Positive for activity change and fatigue. Negative for appetite change, chills, diaphoresis, fever and unexpected weight change. HENT: Negative for congestion, dental problem, drooling, ear discharge, ear pain, facial swelling, hearing loss, mouth sores, nosebleeds, postnasal drip, rhinorrhea, sinus pressure, sneezing, sore throat, tinnitus, trouble swallowing and voice change. Eyes: Negative for photophobia, pain, discharge, redness, itching and visual disturbance. Respiratory: Negative for apnea, cough, choking, chest tightness, shortness of breath, wheezing and stridor. Cardiovascular: Negative for chest pain, palpitations and leg swelling. Gastrointestinal: Negative for abdominal distention, abdominal pain, anal bleeding, blood in stool, constipation, diarrhea, nausea, rectal pain and vomiting. Genitourinary: Negative for decreased urine volume, difficulty urinating, dyspareunia, dysuria, enuresis, flank pain, frequency, genital sores, hematuria, menstrual problem, pelvic pain, urgency, vaginal bleeding, vaginal discharge and vaginal pain. Musculoskeletal: Negative for arthralgias, back pain, gait problem, joint swelling, myalgias, neck pain and neck stiffness. Skin: Negative for color change, pallor, rash and wound. Neurological: Negative for dizziness, tremors, seizures, syncope, facial asymmetry, speech difficulty, weakness, light-headedness, numbness and headaches. Hematological: Negative for adenopathy. Does not bruise/bleed easily. Psychiatric/Behavioral: Negative for agitation, behavioral problems, confusion, decreased concentration, dysphoric mood, hallucinations, self-injury, sleep disturbance and suicidal ideas.  The patient is not nervous/anxious and is not hyperactive. Objective:   /74 (Site: Right Upper Arm, Position: Sitting, Cuff Size: Medium Adult)   Pulse 76   Temp 97.2 °F (36.2 °C) (Temporal)   Resp 20   Ht 5' (1.524 m)   Wt 172 lb (78 kg)   SpO2 98%   BMI 33.59 kg/m²     Physical Exam  Vitals signs reviewed. Constitutional:       General: She is not in acute distress. Appearance: Normal appearance. She is well-developed. She is not ill-appearing or toxic-appearing. HENT:      Head: Normocephalic and atraumatic. Right Ear: Hearing and external ear normal.      Left Ear: Hearing and external ear normal.      Nose: Nose normal.      Mouth/Throat:      Mouth: Mucous membranes are not pale, not dry and not cyanotic. Eyes:      General: Lids are normal.   Neck:      Musculoskeletal: Normal range of motion and neck supple. Trachea: Trachea and phonation normal.   Cardiovascular:      Rate and Rhythm: Normal rate and regular rhythm. Pulses: Normal pulses. Heart sounds: S1 normal and S2 normal.   Pulmonary:      Effort: Pulmonary effort is normal. No tachypnea, bradypnea, accessory muscle usage or respiratory distress. Breath sounds: Normal breath sounds. No decreased breath sounds, wheezing or rales. Chest:      Chest wall: No tenderness. Abdominal:      General: Bowel sounds are normal. There is no distension. Palpations: Abdomen is soft. There is no mass. Tenderness: There is abdominal tenderness. Musculoskeletal: Normal range of motion. General: No tenderness. Skin:     General: Skin is warm and dry. Findings: No abrasion, bruising, burn, ecchymosis, erythema, laceration, lesion or rash. Neurological:      Mental Status: She is alert and oriented to person, place, and time. Motor: No tremor, atrophy or abnormal muscle tone.       Coordination: Coordination normal.      Gait: Gait normal.      Deep Tendon Reflexes: Reflexes are

## 2020-06-30 NOTE — PROGRESS NOTES
 BLADDER SUSPENSION      CATARACT REMOVAL  2015   [facility] COLONOSCOPY      Dr. Rose Marie Hagen- polyps removed, Dr. Bryanna Ca  2020    Dr. Levi Cerrato Right 2016    plate and screw in lower right leg     HEMICOLECTOMY Right 2020    ROBOTIC RIGHT COLECTOMY performed by Dexter Torres MD at [address] [place] [address] Drive,[facility] [place]    Plate inserted into rt. leg    POLYPECTOMY      [facility]- Dr. Hugo Flores  2020    Dr. Denney Narrow release       Family History   Problem Relation Age of Onset    Parkinsonism Sister     Cancer Father     Colon Cancer Brother     Lung Cancer Brother     Heart Disease Paternal [de-identified]         >58 yo       Social History     Tobacco Use    Smoking status: Former Smoker     Packs/day: 0.25     Years: 1.00     Pack years: 0.25     Types: Cigarettes     Last attempt to quit: 1959     Years since quittin.0    Smokeless tobacco: Never Used   Substance Use Topics    Alcohol use: No     Alcohol/week: 0.0 standard drinks      Current Outpatient Medications   Medication Sig Dispense Refill    pramipexole (MIRAPEX) 0.5 MG tablet TAKE 1 TABLET TWICE A  tablet 3    Cholecalciferol (VITAMIN D3) 50 MCG (2000 UT) CAPS Take by mouth Currently taking 5000 IU gel tab daily      omeprazole (PRILOSEC) 40 MG delayed release capsule TAKE 1 CAPSULE BY MOUTH ONCE DAILY      NONFORMULARY Respire Pink Micro Sleep Appliance      rosuvastatin (CRESTOR) 10 MG tablet Take 1 tablet by mouth nightly 90 tablet 3    levothyroxine (SYNTHROID) 50 MCG tablet TAKE 1 TABLET DAILY FOR LOW THYROID 90 tablet 3    ACETAMINOPHEN PO Take 1,000 mg by mouth daily as needed (pain)        No current facility-administered medications for this visit.       Allergies   Allergen Reactions    Penicillins Unsure of reaction, childhood    Lipitor [Atorvastatin] Other (See Comments)     Muscle aches       Subjective:     Review of Systems   Constitutional: Positive for activity change and appetite change. Negative for chills, diaphoresis, fatigue, fever and unexpected weight change. HENT: Negative for congestion, dental problem, drooling, ear discharge, ear pain, facial swelling, hearing loss, mouth sores, nosebleeds, postnasal drip, rhinorrhea, sinus pressure, sneezing, sore throat, tinnitus, trouble swallowing and voice change. Eyes: Negative for photophobia, pain, discharge, redness, itching and visual disturbance. Respiratory: Negative for apnea, cough, choking, chest tightness, shortness of breath, wheezing and stridor. Cardiovascular: Negative for chest pain, palpitations and leg swelling. Gastrointestinal: Negative for abdominal distention, abdominal pain, anal bleeding, blood in stool, constipation, diarrhea, nausea, rectal pain and vomiting. Endocrine: Negative. Genitourinary: Negative for decreased urine volume, difficulty urinating, dyspareunia, dysuria, enuresis, flank pain, frequency, genital sores, hematuria, menstrual problem, pelvic pain, urgency, vaginal bleeding, vaginal discharge and vaginal pain. Musculoskeletal: Negative for arthralgias, back pain, gait problem, joint swelling, myalgias, neck pain and neck stiffness. Skin: Negative for color change, pallor, rash and wound. Allergic/Immunologic: Negative. Neurological: Negative for dizziness, tremors, seizures, syncope, facial asymmetry, speech difficulty, weakness, light-headedness, numbness and headaches. Hematological: Negative. Negative for adenopathy. Does not bruise/bleed easily. Psychiatric/Behavioral: Negative. Negative for agitation, behavioral problems, confusion, decreased concentration, dysphoric mood, hallucinations, self-injury, sleep disturbance and suicidal ideas.  The patient is not nervous/anxious and is not hyperactive. Objective:   /74 (Site: Right Upper Arm, Position: Sitting, Cuff Size: Medium Adult)   Pulse 81   Temp 97 °F (36.1 °C) (Temporal)   Resp 20   Wt 167 lb (75.8 kg)   SpO2 96%   BMI 32.61 kg/m²     Physical Exam  Vitals signs reviewed. Constitutional:       General: She is not in acute distress. Appearance: Normal appearance. She is well-developed. She is not ill-appearing or toxic-appearing. HENT:      Head: Normocephalic and atraumatic. Right Ear: Hearing and external ear normal.      Left Ear: Hearing and external ear normal.      Nose: Nose normal.      Mouth/Throat:      Mouth: Mucous membranes are not pale, not dry and not cyanotic. Eyes:      General: Lids are normal.   Neck:      Musculoskeletal: Normal range of motion and neck supple. Trachea: Trachea and phonation normal.   Cardiovascular:      Rate and Rhythm: Normal rate and regular rhythm. Pulses: Normal pulses. Heart sounds: S1 normal and S2 normal.   Pulmonary:      Effort: Pulmonary effort is normal. No tachypnea, bradypnea, accessory muscle usage or respiratory distress. Breath sounds: Normal breath sounds. No decreased breath sounds, wheezing or rales. Chest:      Chest wall: No tenderness. Abdominal:      General: Bowel sounds are normal. There is no distension. Palpations: Abdomen is soft. There is no mass. Tenderness: There is no abdominal tenderness. Musculoskeletal: Normal range of motion. General: No tenderness. Skin:     General: Skin is warm and dry. Findings: No abrasion, bruising, burn, ecchymosis, erythema, laceration, lesion or rash. Neurological:      Mental Status: She is alert and oriented to person, place, and time. Motor: No tremor, atrophy or abnormal muscle tone. Coordination: Coordination normal.      Gait: Gait normal.      Deep Tendon Reflexes: Reflexes are normal and symmetric. Psychiatric:         Speech: Speech normal.         Behavior: Behavior normal.         Thought Content: Thought content normal.       Patient Active Problem List   Diagnosis    Pure hypercholesterolemia    Vitamin D deficiency    Hypothyroidism    Osteopenia    Parkinson disease ([facility] [address]    Restless leg syndrome    Personal history of DVT (deep vein thrombosis)    Acute diverticulitis    Left lower quadrant pain    Hepatic steatosis    Tubular adenoma    S/P partial resection of colon     Assessment:     1. Status post robotic right colectomy secondary to tubular adenoma  2. GERD    Plan:     1. Abdomen benign. Incisions are healing well without signs of infection. Continue wound care as directed. 2. Appetite doing well. Continue diet as tolerated. High protein supplements encouraged. 3. Bowel function doing well. Stool softeners as needed. 4. Wear abdominal binder for comfort. Off and on as needed throughout the day. 5. Off narcotics. Tylenol as needed for discomfort. 6. Lifting/activity restrictions discussed with patient. Questions answered. 7. Follow up in 4 weeks. Signs and symptoms reviewed with patient that would be concerning and need her to return to office for re-evaluation. Patient states she will call if she has questions or concerns. 8. Follow up with GI as directed. Reviewed pathology again. No questions.       Electronically signed by RISSA Harp CNP on 7/5/2020 at 2:56 PM

## 2020-07-01 ENCOUNTER — OFFICE VISIT (OUTPATIENT)
Dept: SURGERY | Age: 80
End: 2020-07-01

## 2020-07-01 VITALS
TEMPERATURE: 97 F | WEIGHT: 167 LBS | SYSTOLIC BLOOD PRESSURE: 120 MMHG | HEART RATE: 81 BPM | RESPIRATION RATE: 20 BRPM | BODY MASS INDEX: 32.61 KG/M2 | OXYGEN SATURATION: 96 % | DIASTOLIC BLOOD PRESSURE: 74 MMHG

## 2020-07-01 PROCEDURE — 99024 POSTOP FOLLOW-UP VISIT: CPT | Performed by: NURSE PRACTITIONER

## 2020-07-05 ASSESSMENT — ENCOUNTER SYMPTOMS
VOICE CHANGE: 0
SHORTNESS OF BREATH: 0
ABDOMINAL DISTENTION: 0
EYE ITCHING: 0
FACIAL SWELLING: 0
RHINORRHEA: 0
NAUSEA: 0
EYE PAIN: 0
WHEEZING: 0
VOMITING: 0
SINUS PRESSURE: 0
COUGH: 0
STRIDOR: 0
EYE DISCHARGE: 0
CHOKING: 0
APNEA: 0
BLOOD IN STOOL: 0
RECTAL PAIN: 0
SORE THROAT: 0
ALLERGIC/IMMUNOLOGIC NEGATIVE: 1
TROUBLE SWALLOWING: 0
EYE REDNESS: 0
CONSTIPATION: 0
CHEST TIGHTNESS: 0
ABDOMINAL PAIN: 0
COLOR CHANGE: 0
ANAL BLEEDING: 0
PHOTOPHOBIA: 0
BACK PAIN: 0
DIARRHEA: 0

## 2020-07-20 ENCOUNTER — VIRTUAL VISIT (OUTPATIENT)
Dept: NEUROLOGY | Age: 80
End: 2020-07-20
Payer: MEDICARE

## 2020-07-20 PROCEDURE — 99213 OFFICE O/P EST LOW 20 MIN: CPT | Performed by: PSYCHIATRY & NEUROLOGY

## 2020-07-20 RX ORDER — PRAMIPEXOLE DIHYDROCHLORIDE 0.5 MG/1
TABLET ORAL
Qty: 180 TABLET | Refills: 3 | Status: SHIPPED | OUTPATIENT
Start: 2020-07-20 | End: 2021-08-20 | Stop reason: SDUPTHER

## 2020-07-20 NOTE — PROGRESS NOTES
2020    TELEHEALTH EVALUATION -- Audio/Visual (During [facility] public health emergency)    HPI:    Ab Naik (:  1940) has requested an audio/video evaluation for the following concern(s):  Follow-up for Parkinson's, RLS symptoms. The patient is doing well since last evaluation she denies any falls, dysphagia, or hallucination. She is tolerating Mirapex 0.5 mg twice a day. She is sleeping better after using mouthguard. She is recovering from a hand surgery. She is evaluated via video link to discuss plan of care going forward. Review of Systems   Constitutional: Negative. Musculoskeletal: Negative. Skin: Negative. Prior to Visit Medications    Medication Sig Taking?  Authorizing Provider   pramipexole (MIRAPEX) 0.5 MG tablet TAKE 1 TABLET TWICE A DAY  RISSA Montaño - CNP   Cholecalciferol (VITAMIN D3) 50 MCG (2000 UT) CAPS Take by mouth Currently taking 5000 IU gel tab daily  Historical Provider, MD   omeprazole (PRILOSEC) 40 MG delayed release capsule TAKE 1 CAPSULE BY MOUTH ONCE DAILY  Historical Provider, MD   NONFORMULARY Respire Pink Micro Sleep Appliance  Historical Provider, MD   rosuvastatin (CRESTOR) 10 MG tablet Take 1 tablet by mouth nightly  Hazel Cook MD   levothyroxine (SYNTHROID) 50 MCG tablet TAKE 1 TABLET DAILY FOR LOW THYROID  Hazel Cook MD   ACETAMINOPHEN PO Take 1,000 mg by mouth daily as needed (pain)   Historical Provider, MD   pravastatin (PRAVACHOL) 40 MG tablet Take 1 tablet by mouth every evening  Hazel Cook MD       Social History     Tobacco Use    Smoking status: Former Smoker     Packs/day: 0.25     Years: 1.00     Pack years: 0.25     Types: Cigarettes     Last attempt to quit: 1959     Years since quittin.0    Smokeless tobacco: Never Used   Substance Use Topics    Alcohol use: No     Alcohol/week: 0.0 standard drinks    Drug use: No        Past Medical History:   Diagnosis Date    Diverticulosis  Fatty liver     GERD (gastroesophageal reflux disease)     was on prilosec in past    Hiatal hernia     Hx of blood clots     Hyperlipidemia     Hypertension     Hypothyroidism     Incontinence     urinary    Osteoarthritis     Parkinson's disease (HCC)     PONV (postoperative nausea and vomiting)     Prolonged emergence from general anesthesia     Stress fracture     left distal radius    Tubular adenoma     colononoscopy - Dr. Orvilla Heimlich; large sigmoid polyp colonoscopy     Vertigo    ,   Past Surgical History:   Procedure Laterality Date    BLADDER SUSPENSION      CATARACT REMOVAL  2015   Elliot Erin      COLONOSCOPY      Dr. Orvilla Heimlich- polyps removed, Dr. Meagan Horan  2020    Dr. Amandeep Jorge Right 2016    plate and screw in lower right leg     HEMICOLECTOMY Right 2020    ROBOTIC RIGHT COLECTOMY performed by Kobi Horowitz MD at [address] [place] [address],[facility] [place]    Plate inserted into rt. leg    POLYPECTOMY      [facility]- Dr. Esther Young  2020    Dr. Hilda Vail release   ,   Social History     Tobacco Use    Smoking status: Former Smoker     Packs/day: 0.25     Years: 1.00     Pack years: 0.25     Types: Cigarettes     Last attempt to quit: 1959     Years since quittin.0    Smokeless tobacco: Never Used   Substance Use Topics    Alcohol use: No     Alcohol/week: 0.0 standard drinks    Drug use: No   ,   Family History   Problem Relation Age of Onset    Parkinsonism Sister     Cancer Father     Colon Cancer Brother     Lung Cancer Brother     Heart Disease Paternal Grandmother         >56 yo       PHYSICAL EXAMINATION:  [ INSTRUCTIONS:  \"[x]\" Indicates a positive item  \"[]\" Indicates a negative item  -- DELETE ALL ITEMS NOT EXAMINED]  Vital Signs: (As obtained by patient/caregiver or practitioner observation)    Blood pressure-  Heart rate-    Respiratory rate-    Temperature-  Pulse oximetry-     Constitutional: [x] Appears well-developed and well-nourished [x] No apparent distress      [] Abnormal-   Mental status  [x] Alert and awake  [x] Oriented to person/place/time [x]Able to follow commands      Eyes:  EOM    [x]  Normal  [] Abnormal-  Sclera  [x]  Normal  [] Abnormal -         Discharge [x]  None visible  [] Abnormal -    HENT:   [x] Normocephalic, atraumatic. [] Abnormal   [x] Mouth/Throat: Mucous membranes are moist.     External Ears [x] Normal  [] Abnormal-     Neck: [x] No visualized mass     Pulmonary/Chest: [x] Respiratory effort normal.  [x] No visualized signs of difficulty breathing or respiratory distress        [] Abnormal-      Musculoskeletal:   [] Normal gait with no signs of ataxia         [x] Normal range of motion of neck        [] Abnormal-       Neurological:        [x] No Facial Asymmetry (Cranial nerve 7 motor function) (limited exam to video visit)          [x] No gaze palsy        [] Abnormal-         Skin:        [x] No significant exanthematous lesions or discoloration noted on facial skin         [] Abnormal-            Psychiatric:       [x] Normal Affect [x] No Hallucinations        [] Abnormal-     Other pertinent observable physical exam findings-     ASSESSMENT/PLAN:  1. Parkinson disease ([address]  2. Restless leg syndrome   she is doing well. She is sleeping better, with mouth guard. She is recovering from hand surgery. She denies any falls dysphagia, hallucination. She is tolerating the Mirapex well. She is otherwise active physically. She requested refills. After detailed discussion with patient we agreed on the following plan. 1. Continue with Mirapex 0.5 mg twice a day. Refills given. 2. Stay Active. 3.  Follow up in 12 months or sooner if needed. 4. Call if any questions or concerns.     Return in about 1 year (around 7/20/2021). Vamsi Brown is a 78 y.o. female being evaluated by a Virtual Visit (video visit) encounter to address concerns as mentioned above. A caregiver was present when appropriate. Due to this being a TeleHealth encounter ([facility]), evaluation of the following organ systems was limited: Vitals/Constitutional/EENT/Resp/CV/GI//MS/Neuro/Skin/Heme-Lymph-Imm. Pursuant to the emergency declaration under the [address] [place] [address] authority and the [employer] Act, this Virtual Visit was conducted with patient's (and/or legal guardian's) consent, to reduce the patient's risk of exposure to COVID-19 and provide necessary medical care. The patient (and/or legal guardian) has also been advised to contact this office for worsening conditions or problems, and seek emergency medical treatment and/or call 911 if deemed necessary. Patient identification was verified at the start of the visit: Yes    Total time spent on this encounter: 15 min    Services were provided through a video synchronous discussion virtually to substitute for in-person clinic visit. Patient and provider were located at their individual homes. --Paty Samuel MD on 7/20/2020 at 12:40 PM    An electronic signature was used to authenticate this note.

## 2020-07-20 NOTE — PATIENT INSTRUCTIONS
1. Continue with Mirapex 0.5 mg twice a day. Refills given. 2. Stay Active. 3.  Follow up in 12 months or sooner if needed. 4. Call if any questions or concerns.

## 2020-07-26 NOTE — PROGRESS NOTES
[address] [facility] [id number]  Dept: [phone]  Dept Fax: [phone]  Loc: [phone]    Visit Date: 7/27/2020    Lin Light is a 78 y.o. female who presents today for:  Chief Complaint   Patient presents with    Post-Op Check     3 week f/u--s/p Robotic right colectomy 6/4/20       HPI:     SEUN Patiño is a 70-year old female patient who presents for follow up status post robotic right colectomy 7 weeks ago with Dr. Keeley Grady. Pathology demonstrated tubular adenoma and diverticulosis. Continues to do really well. Has very minimal intermittent discomforts. No real pain per patient. Has not taken anything OTC for pain. Abdominal incisions are healing. Robotic sites are clean, dry and intact without signs of infection. Lower extraction site healing without signs of infection. Patient denies any drainage or increasing redness. Appetite back to normal. Tolerating diet well. Denies any nausea. No emesis. Bowel function doing well. She is going daily without stool softeners. No abdominal bloating or cramping. No melena or hematochezia. No fevers or chills. No urinary complaints. No SOB or chest pain. No lightheadedness or dizziness. No calf pain or swelling. Fatigue continues to improve.     Past Medical History:   Diagnosis Date    Diverticulosis     Fatty liver     GERD (gastroesophageal reflux disease)     was on prilosec in past    Hiatal hernia     Hx of blood clots     Hyperlipidemia     Hypertension     Hypothyroidism     Incontinence     urinary    Osteoarthritis     Parkinson's disease ([facility] [address]     PONV (postoperative nausea and vomiting)     Prolonged emergence from general anesthesia     Stress fracture     left distal radius    Tubular adenoma     colononoscopy 2010- Dr. Jose G Rodriguez; large sigmoid polyp colonoscopy 2016    Vertigo       Past Surgical History:   Procedure Laterality Date    BLADDER appetite change, chills, diaphoresis, fever and unexpected weight change. HENT: Negative for congestion, dental problem, drooling, ear discharge, ear pain, facial swelling, hearing loss, mouth sores, nosebleeds, postnasal drip, rhinorrhea, sinus pressure, sneezing, sore throat, tinnitus, trouble swallowing and voice change. Eyes: Negative for photophobia, pain, discharge, redness, itching and visual disturbance. Respiratory: Negative for apnea, cough, choking, chest tightness, shortness of breath, wheezing and stridor. Cardiovascular: Negative for chest pain, palpitations and leg swelling. Gastrointestinal: Negative for abdominal distention, abdominal pain, anal bleeding, blood in stool, constipation, diarrhea, nausea, rectal pain and vomiting. Endocrine: Negative. Genitourinary: Negative for decreased urine volume, difficulty urinating, dyspareunia, dysuria, enuresis, flank pain, frequency, genital sores, hematuria, menstrual problem, pelvic pain, urgency, vaginal bleeding, vaginal discharge and vaginal pain. Musculoskeletal: Negative for arthralgias, back pain, gait problem, joint swelling, myalgias, neck pain and neck stiffness. Skin: Negative for color change, pallor, rash and wound. Allergic/Immunologic: Negative. Neurological: Negative for dizziness, tremors, seizures, syncope, facial asymmetry, speech difficulty, weakness, light-headedness, numbness and headaches. Hematological: Negative. Negative for adenopathy. Does not bruise/bleed easily. Psychiatric/Behavioral: Negative. Negative for agitation, behavioral problems, confusion, decreased concentration, dysphoric mood, hallucinations, self-injury, sleep disturbance and suicidal ideas. The patient is not nervous/anxious and is not hyperactive.         Objective:   /60 (Site: Left Upper Arm, Position: Sitting, Cuff Size: Medium Adult)   Pulse 78   Temp 97.8 °F (36.6 °C) (Temporal)   Resp 18   Ht 5' (1.524 m)   Wt 170 lb 8 oz (77.3 kg)   SpO2 96%   BMI 33.30 kg/m²     Physical Exam  Vitals signs reviewed. Constitutional:       General: She is not in acute distress. Appearance: Normal appearance. She is well-developed. She is not ill-appearing or toxic-appearing. HENT:      Head: Normocephalic and atraumatic. Right Ear: Hearing and external ear normal.      Left Ear: Hearing and external ear normal.      Nose: Nose normal.      Mouth/Throat:      Mouth: Mucous membranes are not pale, not dry and not cyanotic. Eyes:      General: Lids are normal.   Neck:      Musculoskeletal: Normal range of motion and neck supple. Trachea: Trachea and phonation normal.   Cardiovascular:      Rate and Rhythm: Normal rate and regular rhythm. Pulses: Normal pulses. Heart sounds: S1 normal and S2 normal.   Pulmonary:      Effort: Pulmonary effort is normal. No tachypnea, bradypnea, accessory muscle usage or respiratory distress. Breath sounds: Normal breath sounds. No decreased breath sounds, wheezing or rales. Chest:      Chest wall: No tenderness. Abdominal:      General: Bowel sounds are normal. There is no distension. Palpations: Abdomen is soft. There is no mass. Tenderness: There is no abdominal tenderness. Musculoskeletal: Normal range of motion. General: No tenderness. Skin:     General: Skin is warm and dry. Findings: No abrasion, bruising, burn, ecchymosis, erythema, laceration, lesion or rash. Neurological:      Mental Status: She is alert and oriented to person, place, and time. Motor: No tremor, atrophy or abnormal muscle tone. Coordination: Coordination normal.      Gait: Gait normal.      Deep Tendon Reflexes: Reflexes are normal and symmetric. Psychiatric:         Speech: Speech normal.         Behavior: Behavior normal.         Thought Content:  Thought content normal.       Patient Active Problem List   Diagnosis    Pure hypercholesterolemia    Vitamin D deficiency    Hypothyroidism    Osteopenia    Parkinson disease ([facility] [address]    Restless leg syndrome    Personal history of DVT (deep vein thrombosis)    Acute diverticulitis    Left lower quadrant pain    Hepatic steatosis    Tubular adenoma    S/P partial resection of colon     Assessment:     1. Status post robotic right colectomy secondary to tubular adenoma  2. Obesity    Plan:     1. Abdomen benign. Incisions are well healed. 2. Appetite and bowel function doing well. No stool softeners needed. 3. Dietary modifications as needed. Continue AHA diet as tolerated. 4. Denies much pain. OTC tylenol as needed. 5. Increase activity as tolerated  6. Pathology reviewed and discussed again. Follow up for colonoscopy per GI recommendations. 7. Follow up as needed with us. Signs and symptoms reviewed with patient that would be concerning and need her to return to office for re-evaluation. Patient states she will call if she has questions or concerns.   8. Follow up with PCP as directed       Electronically signed by RISSA Gill CNP on 7/28/2020 at 4:25 PM

## 2020-07-27 ENCOUNTER — OFFICE VISIT (OUTPATIENT)
Dept: SURGERY | Age: 80
End: 2020-07-27

## 2020-07-27 VITALS
DIASTOLIC BLOOD PRESSURE: 60 MMHG | BODY MASS INDEX: 33.47 KG/M2 | OXYGEN SATURATION: 96 % | SYSTOLIC BLOOD PRESSURE: 138 MMHG | HEART RATE: 78 BPM | RESPIRATION RATE: 18 BRPM | HEIGHT: 60 IN | WEIGHT: 170.5 LBS | TEMPERATURE: 97.8 F

## 2020-07-27 PROCEDURE — 99024 POSTOP FOLLOW-UP VISIT: CPT | Performed by: NURSE PRACTITIONER

## 2020-07-28 ASSESSMENT — ENCOUNTER SYMPTOMS
VOICE CHANGE: 0
COLOR CHANGE: 0
SHORTNESS OF BREATH: 0
ABDOMINAL DISTENTION: 0
ANAL BLEEDING: 0
STRIDOR: 0
BACK PAIN: 0
EYE ITCHING: 0
RECTAL PAIN: 0
PHOTOPHOBIA: 0
CONSTIPATION: 0
EYE REDNESS: 0
CHEST TIGHTNESS: 0
SORE THROAT: 0
ABDOMINAL PAIN: 0
CHOKING: 0
EYE PAIN: 0
BLOOD IN STOOL: 0
APNEA: 0
DIARRHEA: 0
EYE DISCHARGE: 0
FACIAL SWELLING: 0
NAUSEA: 0
VOMITING: 0
TROUBLE SWALLOWING: 0
COUGH: 0
ALLERGIC/IMMUNOLOGIC NEGATIVE: 1
WHEEZING: 0
RHINORRHEA: 0
SINUS PRESSURE: 0

## 2020-10-15 ENCOUNTER — NURSE ONLY (OUTPATIENT)
Dept: FAMILY MEDICINE CLINIC | Age: 80
End: 2020-10-15
Payer: MEDICARE

## 2020-10-15 PROCEDURE — G0008 ADMIN INFLUENZA VIRUS VAC: HCPCS | Performed by: FAMILY MEDICINE

## 2020-10-15 PROCEDURE — 90694 VACC AIIV4 NO PRSRV 0.5ML IM: CPT | Performed by: FAMILY MEDICINE

## 2020-10-15 NOTE — PROGRESS NOTES
Immunizations Administered     Name Date Dose Route    Influenza, Quadv, adjuvanted, 65 yrs +, IM, PF (Fluad) 10/15/2020 0.5 mL Intramuscular    Site: Deltoid- Left    Lot: [id number]    NDC: 78123-602-76          VIS GIVEN. CONSENT SIGNED  PATIENT TOLERATED WELL.

## 2020-11-18 RX ORDER — LEVOTHYROXINE SODIUM 0.05 MG/1
TABLET ORAL
Qty: 90 TABLET | Refills: 0 | Status: SHIPPED | OUTPATIENT
Start: 2020-11-18 | End: 2021-02-15

## 2020-11-18 RX ORDER — ROSUVASTATIN CALCIUM 10 MG/1
TABLET, COATED ORAL
Qty: 90 TABLET | Refills: 0 | Status: SHIPPED | OUTPATIENT
Start: 2020-11-18 | End: 2021-02-15

## 2020-12-10 ENCOUNTER — OFFICE VISIT (OUTPATIENT)
Dept: FAMILY MEDICINE CLINIC | Age: 80
End: 2020-12-10
Payer: MEDICARE

## 2020-12-10 VITALS
WEIGHT: 171 LBS | RESPIRATION RATE: 18 BRPM | DIASTOLIC BLOOD PRESSURE: 64 MMHG | HEIGHT: 60 IN | TEMPERATURE: 97.5 F | SYSTOLIC BLOOD PRESSURE: 128 MMHG | BODY MASS INDEX: 33.57 KG/M2 | HEART RATE: 76 BPM | OXYGEN SATURATION: 95 %

## 2020-12-10 PROCEDURE — 99214 OFFICE O/P EST MOD 30 MIN: CPT | Performed by: FAMILY MEDICINE

## 2020-12-10 NOTE — PROGRESS NOTES
Oralia Simpson is a [de-identified] y.o. female who presents today for:   Chief Complaint   Patient presents with    Diarrhea     Past month          HPI:      HPI     Here for routine check    Had R partial colectomy  Occasional diarrhea  Feels diarrhea related to dietary indiscretions  Notes some increased sensitivity with certain foods since the colectomy with occ diarrhea  Dr. Dana Simeon did colonoscopy ealrier this year    HTN- BP controlled. No cp/sob/ha/dizziness. Tolerating meds well with no side effects. Parkinson's stable  Follows with neurology      Hypothyroidism: Patient complains of hypothyroidism. Symptoms include none. Patient denies change in energy level, heat / cold intolerance, nervousness and palpitations. Onset of symptoms was several years ago. Symptoms have stabilized. Patient's medications, allergies, past medical, surgical, social,and family histories were reviewed and updated as appropriate. Outpatient Medications Prior to Visit   Medication Sig Dispense Refill    Cholecalciferol (VITAMIN D3) 125 MCG (5000 UT) TABS Take by mouth      Fexofenadine HCl (ALLEGRA ALLERGY PO) Take by mouth      levothyroxine (SYNTHROID) 50 MCG tablet TAKE 1 TABLET DAILY FOR LOW THYROID 90 tablet 0    rosuvastatin (CRESTOR) 10 MG tablet TAKE 1 TABLET NIGHTLY 90 tablet 0    pramipexole (MIRAPEX) 0.5 MG tablet TAKE 1 TABLET TWICE A  tablet 3    NONFORMULARY Respire Pink Micro Sleep Appliance      ACETAMINOPHEN PO Take 1,000 mg by mouth daily as needed (pain)        No facility-administered medications prior to visit. Subjective:     Review of Systems   Constitutional: Negative for activity change, appetite change, chills, diaphoresis, fatigue, fever and unexpected weight change. HENT: Negative for congestion. Respiratory: Negative for cough, shortness of breath, wheezing and stridor. Cardiovascular: Negative for chest pain, palpitations and leg swelling. Gastrointestinal: Negative for abdominal pain, blood in stool, constipation, nausea and vomiting. Genitourinary: Negative for difficulty urinating. Musculoskeletal: Negative. Skin: Negative. Neurological: Negative for headaches. Psychiatric/Behavioral: Negative. Objective:     Vitals:    12/10/20 1443   BP: 128/64   Site: Left Upper Arm   Position: Sitting   Cuff Size: Large Adult   Pulse: 76   Resp: 18   Temp: 97.5 °F (36.4 °C)   TempSrc: Temporal   SpO2: 95%   Weight: 171 lb (77.6 kg)   Height: 5' (1.524 m)     Wt Readings from Last 3 Encounters:   12/10/20 171 lb (77.6 kg)   07/27/20 170 lb 8 oz (77.3 kg)   07/01/20 167 lb (75.8 kg)     Physical Exam  Vitals signs and nursing note reviewed. Constitutional:       General: She is not in acute distress. Appearance: She is well-developed. She is not diaphoretic. HENT:      Head: Normocephalic and atraumatic. Right Ear: Tympanic membrane, ear canal and external ear normal.      Left Ear: Tympanic membrane, ear canal and external ear normal.      Nose: Nose normal.      Mouth/Throat:      Pharynx: Uvula midline. Eyes:      General:         Right eye: No discharge. Left eye: No discharge. Conjunctiva/sclera: Conjunctivae normal.   Neck:      Musculoskeletal: Normal range of motion and neck supple. Thyroid: No thyromegaly. Trachea: No tracheal deviation. Cardiovascular:      Rate and Rhythm: Normal rate and regular rhythm. Heart sounds: Normal heart sounds. No murmur. No friction rub. No gallop. Pulmonary:      Effort: Pulmonary effort is normal. No respiratory distress. Breath sounds: Normal breath sounds. No stridor. No wheezing or rales. Abdominal:      General: There is no distension. Palpations: Abdomen is soft. There is no mass. Tenderness: There is no abdominal tenderness. There is no guarding or rebound. Lymphadenopathy:      Cervical: No cervical adenopathy.    Skin: General: Skin is warm and dry. Findings: No rash. Neurological:      Mental Status: She is alert and oriented to person, place, and time. Psychiatric:         Behavior: Behavior normal.         Thought Content: Thought content normal.         Judgment: Judgment normal.           Assessment/Plan:      Diagnosis Orders   1. Parkinson disease ([facility] [address]     2. S/P partial resection of colon     3. Tubular adenoma     4. Acquired hypothyroidism     5. Pure hypercholesterolemia       Discussed doing diet diary  If symptoms persist or worsen, to let me or GI know    Get labs checked that were ordered, including thyroid given diarrhea        Patient given educational materials- see patient instructions. Discussed use, benefit, and side effects of prescribedmedications. All patient questions answered. Pt voiced understanding. Reviewedhealth maintenance. Discussed medications, diet and exercise. Patient agreed withtreatment plan. Follow up as directed. Return in about 1 year (around 12/10/2021).       (Please notethat portions of this note were completed with a voice recognition program. Effortswere made to edit the dictations but occasionally words are mis-transcribed.)

## 2020-12-12 ENCOUNTER — TELEPHONE (OUTPATIENT)
Dept: FAMILY MEDICINE CLINIC | Age: 80
End: 2020-12-12

## 2020-12-12 ASSESSMENT — ENCOUNTER SYMPTOMS
COUGH: 0
BLOOD IN STOOL: 0
SHORTNESS OF BREATH: 0
WHEEZING: 0
VOMITING: 0
ABDOMINAL PAIN: 0
STRIDOR: 0
NAUSEA: 0
CONSTIPATION: 0

## 2020-12-14 ENCOUNTER — NURSE ONLY (OUTPATIENT)
Dept: LAB | Age: 80
End: 2020-12-14

## 2020-12-14 LAB
ALBUMIN SERPL-MCNC: 4.2 G/DL (ref 3.5–5.1)
ALP BLD-CCNC: 69 U/L (ref 38–126)
ALT SERPL-CCNC: 14 U/L (ref 11–66)
ANION GAP SERPL CALCULATED.3IONS-SCNC: 9 MEQ/L (ref 8–16)
AST SERPL-CCNC: 19 U/L (ref 5–40)
BASOPHILS # BLD: 0.6 %
BASOPHILS ABSOLUTE: 0 THOU/MM3 (ref 0–0.1)
BILIRUB SERPL-MCNC: 0.7 MG/DL (ref 0.3–1.2)
BUN BLDV-MCNC: 13 MG/DL (ref 7–22)
CALCIUM SERPL-MCNC: 10.5 MG/DL (ref 8.5–10.5)
CHLORIDE BLD-SCNC: 102 MEQ/L (ref 98–111)
CHOLESTEROL, FASTING: 177 MG/DL (ref 100–199)
CO2: 30 MEQ/L (ref 23–33)
CREAT SERPL-MCNC: 0.7 MG/DL (ref 0.4–1.2)
EOSINOPHIL # BLD: 1.8 %
EOSINOPHILS ABSOLUTE: 0.1 THOU/MM3 (ref 0–0.4)
ERYTHROCYTE [DISTWIDTH] IN BLOOD BY AUTOMATED COUNT: 12.9 % (ref 11.5–14.5)
ERYTHROCYTE [DISTWIDTH] IN BLOOD BY AUTOMATED COUNT: 44.5 FL (ref 35–45)
GFR SERPL CREATININE-BSD FRML MDRD: 80 ML/MIN/1.73M2
GLUCOSE FASTING: 93 MG/DL (ref 70–108)
HCT VFR BLD CALC: 42.9 % (ref 37–47)
HDLC SERPL-MCNC: 51 MG/DL
HEMOGLOBIN: 13.9 GM/DL (ref 12–16)
IMMATURE GRANS (ABS): 0.01 THOU/MM3 (ref 0–0.07)
IMMATURE GRANULOCYTES: 0.2 %
LDL CHOLESTEROL CALCULATED: 78 MG/DL
LYMPHOCYTES # BLD: 49.1 %
LYMPHOCYTES ABSOLUTE: 2.4 THOU/MM3 (ref 1–4.8)
MCH RBC QN AUTO: 30.5 PG (ref 26–33)
MCHC RBC AUTO-ENTMCNC: 32.4 GM/DL (ref 32.2–35.5)
MCV RBC AUTO: 94.1 FL (ref 81–99)
MONOCYTES # BLD: 6.7 %
MONOCYTES ABSOLUTE: 0.3 THOU/MM3 (ref 0.4–1.3)
NUCLEATED RED BLOOD CELLS: 0 /100 WBC
PLATELET # BLD: 192 THOU/MM3 (ref 130–400)
PMV BLD AUTO: 11.1 FL (ref 9.4–12.4)
POTASSIUM SERPL-SCNC: 4.3 MEQ/L (ref 3.5–5.2)
RBC # BLD: 4.56 MILL/MM3 (ref 4.2–5.4)
SEG NEUTROPHILS: 41.6 %
SEGMENTED NEUTROPHILS ABSOLUTE COUNT: 2 THOU/MM3 (ref 1.8–7.7)
SODIUM BLD-SCNC: 141 MEQ/L (ref 135–145)
TOTAL PROTEIN: 6.8 G/DL (ref 6.1–8)
TRIGLYCERIDE, FASTING: 241 MG/DL (ref 0–199)
TSH SERPL DL<=0.05 MIU/L-ACNC: 4.01 UIU/ML (ref 0.4–4.2)
WBC # BLD: 4.9 THOU/MM3 (ref 4.8–10.8)

## 2021-02-13 DIAGNOSIS — R53.82 CHRONIC FATIGUE: ICD-10-CM

## 2021-02-13 DIAGNOSIS — E03.9 ACQUIRED HYPOTHYROIDISM: ICD-10-CM

## 2021-02-13 DIAGNOSIS — E78.00 PURE HYPERCHOLESTEROLEMIA: ICD-10-CM

## 2021-02-15 RX ORDER — LEVOTHYROXINE SODIUM 0.05 MG/1
TABLET ORAL
Qty: 90 TABLET | Refills: 3 | Status: SHIPPED | OUTPATIENT
Start: 2021-02-15 | End: 2022-02-10

## 2021-02-15 RX ORDER — ROSUVASTATIN CALCIUM 10 MG/1
TABLET, COATED ORAL
Qty: 90 TABLET | Refills: 3 | Status: SHIPPED | OUTPATIENT
Start: 2021-02-15 | End: 2022-02-10

## 2021-02-25 ENCOUNTER — IMMUNIZATION (OUTPATIENT)
Dept: PRIMARY CARE CLINIC | Age: 81
End: 2021-02-25
Payer: MEDICARE

## 2021-02-25 PROCEDURE — 91301 COVID-19, MODERNA VACCINE 100MCG/0.5ML DOSE: CPT | Performed by: FAMILY MEDICINE

## 2021-02-25 PROCEDURE — 0012A PR IMM ADMN SARSCOV2 100 MCG/0.5 ML 2ND DOSE: CPT | Performed by: FAMILY MEDICINE

## 2021-03-18 ENCOUNTER — OFFICE VISIT (OUTPATIENT)
Dept: FAMILY MEDICINE CLINIC | Age: 81
End: 2021-03-18
Payer: MEDICARE

## 2021-03-18 VITALS
TEMPERATURE: 98.1 F | SYSTOLIC BLOOD PRESSURE: 124 MMHG | HEART RATE: 66 BPM | DIASTOLIC BLOOD PRESSURE: 80 MMHG | WEIGHT: 170 LBS | BODY MASS INDEX: 33.2 KG/M2 | RESPIRATION RATE: 14 BRPM

## 2021-03-18 DIAGNOSIS — R42 VERTIGO: Primary | ICD-10-CM

## 2021-03-18 DIAGNOSIS — K29.00 ACUTE GASTRITIS WITHOUT HEMORRHAGE, UNSPECIFIED GASTRITIS TYPE: ICD-10-CM

## 2021-03-18 DIAGNOSIS — I82.441 ACUTE DVT OF RIGHT TIBIAL VEIN (HCC): ICD-10-CM

## 2021-03-18 DIAGNOSIS — G20 PARKINSON DISEASE (HCC): ICD-10-CM

## 2021-03-18 PROCEDURE — 99214 OFFICE O/P EST MOD 30 MIN: CPT | Performed by: NURSE PRACTITIONER

## 2021-03-18 RX ORDER — MECLIZINE HYDROCHLORIDE 25 MG/1
25 TABLET ORAL 3 TIMES DAILY PRN
Qty: 20 TABLET | Refills: 0 | Status: SHIPPED | OUTPATIENT
Start: 2021-03-18 | End: 2021-03-28

## 2021-03-18 ASSESSMENT — ENCOUNTER SYMPTOMS
EYES NEGATIVE: 1
RESPIRATORY NEGATIVE: 1
GASTROINTESTINAL NEGATIVE: 1

## 2021-03-18 NOTE — PROGRESS NOTES
Adrianna Ken is a [de-identified] y.o. female whopresents today for :  Chief Complaint   Patient presents with    Dizziness     discuss medication options. Patient states ongoing for about 4 years after hitting head on a branch. HPI:     HPI  Pt here with issues with int vertigo. This has been an issue for some time. Lately has been flared. Pt attributes it to eating more salty potato chips lately. She also has int periods of epigastric abd pain.   Pt attributes it to  Prior surgery       Patient Active Problem List   Diagnosis    Pure hypercholesterolemia    Vitamin D deficiency    Hypothyroidism    Osteopenia    Parkinson disease ([address]    Restless leg syndrome    Personal history of DVT (deep vein thrombosis)    Acute diverticulitis    Left lower quadrant pain    Hepatic steatosis    Tubular adenoma    S/P partial resection of colon    Acute DVT of right tibial vein (HCC)        Past Medical History:   Diagnosis Date    Diverticulosis     Fatty liver     GERD (gastroesophageal reflux disease)     was on prilosec in past    Hiatal hernia     Hx of blood clots     Hyperlipidemia     Hypertension     Hypothyroidism     Incontinence     urinary    Osteoarthritis     Parkinson's disease ([address]     PONV (postoperative nausea and vomiting)     Prolonged emergence from general anesthesia     Stress fracture     left distal radius    Tubular adenoma     colononoscopy 2010- Dr. Bishnu Baeza; large sigmoid polyp colonoscopy 2016    Vertigo       Past Surgical History:   Procedure Laterality Date    BLADDER SUSPENSION      CATARACT REMOVAL  05 23 2015   Elena García      COLONOSCOPY  2010, june 2016    Dr. Bishnu Baeza- polyps removed, Dr. Davon Brady    COLONOSCOPY  03/2020    Dr. Huan Tabares Right 08/01/2016    plate and screw in lower right leg     HEMICOLECTOMY Right 6/4/2020    ROBOTIC RIGHT COLECTOMY performed by Katie Lagos MD at [facility]    OTHER SURGICAL HISTORY Right 2016    Plate inserted into rt. leg    [facility]      [facility] Breezy Maradiaga  2020    Dr. Rizwan Cam release     Family History   Problem Relation Age of Onset    Parkinsonism Sister     Cancer Father     Colon Cancer Brother     Lung Cancer Brother     Heart Disease Paternal [de-identified]         >56 yo     Social History     Tobacco Use    Smoking status: Former Smoker     Packs/day: 0.25     Years: 1.00     Pack years: 0.25     Types: Cigarettes     Quit date: 1959     Years since quittin.7    Smokeless tobacco: Never Used   Substance Use Topics    Alcohol use: No     Alcohol/week: 0.0 standard drinks      Current Outpatient Medications   Medication Sig Dispense Refill    meclizine (ANTIVERT) 25 MG tablet Take 1 tablet by mouth 3 times daily as needed for Dizziness 20 tablet 0    rosuvastatin (CRESTOR) 10 MG tablet TAKE 1 TABLET NIGHTLY 90 tablet 3    levothyroxine (SYNTHROID) 50 MCG tablet TAKE 1 TABLET DAILY FOR LOW THYROID 90 tablet 3    Cholecalciferol (VITAMIN D3) 125 MCG (5000 UT) TABS Take by mouth      Fexofenadine HCl (ALLEGRA ALLERGY PO) Take by mouth      pramipexole (MIRAPEX) 0.5 MG tablet TAKE 1 TABLET TWICE A  tablet 3    NONFORMULARY Respire Pink Micro Sleep Appliance      ACETAMINOPHEN PO Take 1,000 mg by mouth daily as needed (pain)        No current facility-administered medications for this visit.       Allergies   Allergen Reactions    Penicillins      Unsure of reaction, childhood    Lipitor [Atorvastatin] Other (See Comments)     Muscle aches     Health Maintenance   Topic Date Due    Shingles Vaccine (2 of 3) 2016    Annual Wellness Visit (AWV)  04/15/2021    Lipid screen  2021    TSH testing  2021    Colon cancer screen colonoscopy  2023    DTaP/Tdap/Td vaccine (2 - Td) 10/19/2028    DEXA (modify frequency per FRAX score)  Completed    Flu vaccine  Completed    Pneumococcal 65+ years Vaccine  Completed    COVID-19 Vaccine  Completed    Hepatitis A vaccine  Aged Out    Hepatitis B vaccine  Aged Out    Hib vaccine  Aged Out    Meningococcal (ACWY) vaccine  Aged Out       Subjective:     Review of Systems   Constitutional: Negative. HENT: Negative. Eyes: Negative. Respiratory: Negative. Cardiovascular: Negative. Gastrointestinal: Negative. Musculoskeletal: Negative. Skin: Negative. Neurological: Positive for dizziness. Objective:     Vitals:    03/18/21 1047   BP: 124/80   Site: Left Upper Arm   Position: Sitting   Cuff Size: Medium Adult   Pulse: 66   Resp: 14   Temp: 98.1 °F (36.7 °C)   TempSrc: Temporal   Weight: 170 lb (77.1 kg)       Physical Exam  Constitutional:       Appearance: She is well-developed. HENT:      Head: Normocephalic. Right Ear: Tympanic membrane and external ear normal.      Left Ear: Tympanic membrane and external ear normal.      Nose: Nose normal.   Neck:      Musculoskeletal: Normal range of motion and neck supple. Cardiovascular:      Rate and Rhythm: Normal rate and regular rhythm. Heart sounds: Normal heart sounds. No murmur. No friction rub. No gallop. Pulmonary:      Effort: Pulmonary effort is normal.      Breath sounds: Normal breath sounds. No wheezing or rales. Abdominal:      General: Bowel sounds are normal.      Palpations: Abdomen is soft. Tenderness: There is abdominal tenderness in the left upper quadrant. There is no guarding. Musculoskeletal: Normal range of motion. Lymphadenopathy:      Cervical: No cervical adenopathy. Skin:     General: Skin is warm. Neurological:      Mental Status: She is alert and oriented to person, place, and time. Deep Tendon Reflexes: Reflexes are normal and symmetric. Comments:  With laying and turning head to her right had momentary vertigo           Assessment: Diagnosis Orders   1. Vertigo  meclizine (ANTIVERT) 25 MG tablet   2. Parkinson disease ([facility] [address]     3. Acute DVT of right tibial vein ([facility] [address]     4. Acute gastritis without hemorrhage, unspecified gastritis type         Plan:      No follow-ups on file. No orders of the defined types were placed in this encounter. Orders Placed This Encounter   Medications    meclizine (ANTIVERT) 25 MG tablet     Sig: Take 1 tablet by mouth 3 times daily as needed for Dizziness     Dispense:  20 tablet     Refill:  0        Discussed low salt diet  Prn antivert  For her stomach. Advised to try a 2 week course of nexium. If helps issue is likely gastritis   Patient given educational materials - seepatient instructions. Discussed use, benefit, and side effects of prescribed medications. All patient questions answered. Pt voiced understanding. Patient agreed withtreatment plan. Follow up as directed.      Electronically signed by RISSA Lucero CNP on 3/18/2021 at 1:25 PM

## 2021-05-13 ENCOUNTER — NURSE TRIAGE (OUTPATIENT)
Dept: OTHER | Facility: CLINIC | Age: 81
End: 2021-05-13

## 2021-05-13 NOTE — TELEPHONE ENCOUNTER
Received call from Dina at pre-service center [facility]) MARVA DANG II.MARDARIEL with [employer] Complaint. Brief description of triage: right sided headache    Triage indicates for patient to PCP today or tomorrow. Informed to go to urgent care or ED if unable to get appt. Care advice provided, patient verbalizes understanding; denies any other questions or concerns; instructed to call back for any new or worsening symptoms. Writer provided warm [facility] for appointment scheduling. Attention Provider: Thank you for allowing me to participate in the care of your patient. The patient was connected to triage in response to information provided to the [facility]. Please do not respond through this encounter as the response is not directed to a shared pool. Reason for Disposition   New headache and age > 48    Answer Assessment - Initial Assessment Questions  1. LOCATION: \"Where does it hurt? \"       Below right temple just behind the ear. Radiating from front of the ear to the back. 2. ONSET: \"When did the headache start? \" (Minutes, hours or days)       2 days ago 2 episodes, yesterday 2 episodes, 4 episodes today. Lasting a few seconds each episode. 3. PATTERN: \"Does the pain come and go, or has it been constant since it started? \"      Come and go. 4. SEVERITY: \"How bad is the pain? \" and \"What does it keep you from doing? \"  (e.g., Scale 1-10; mild, moderate, or severe)    - MILD (1-3): doesn't interfere with normal activities     - MODERATE (4-7): interferes with normal activities or awakens from sleep     - SEVERE (8-10): excruciating pain, unable to do any normal activities         7-8/10 during episode. Does not take any pain medication. 5. RECURRENT SYMPTOM: \"Have you ever had headaches before? \" If so, ask: \"When was the last time? \" and \"What happened that time? \"       Denies history of same pain. History of parkinson's on right side.     6. CAUSE: \"What do you think is causing the headache? \"      Unsure. 7. MIGRAINE: \"Have you been diagnosed with migraine headaches? \" If so, ask: \"Is this headache similar? \"       Denies     8. HEAD INJURY: \"Has there been any recent injury to the head? \"       Denies    9. OTHER SYMPTOMS: \"Do you have any other symptoms? \" (fever, stiff neck, eye pain, sore throat, cold symptoms)      Denies inner ear pain. Denies drainage from ear. Denies pain into neck or jaw.     Protocols used: HEADACHE-ADULT-[place]

## 2021-05-14 ENCOUNTER — OFFICE VISIT (OUTPATIENT)
Dept: FAMILY MEDICINE CLINIC | Age: 81
End: 2021-05-14
Payer: MEDICARE

## 2021-05-14 ENCOUNTER — HOSPITAL ENCOUNTER (OUTPATIENT)
Dept: MRI IMAGING | Age: 81
Discharge: HOME | End: 2021-05-14
Payer: MEDICARE

## 2021-05-14 VITALS
DIASTOLIC BLOOD PRESSURE: 80 MMHG | SYSTOLIC BLOOD PRESSURE: 156 MMHG | HEART RATE: 72 BPM | OXYGEN SATURATION: 96 % | BODY MASS INDEX: 33.79 KG/M2 | WEIGHT: 173 LBS | RESPIRATION RATE: 14 BRPM | TEMPERATURE: 97.2 F

## 2021-05-14 DIAGNOSIS — G20 PARKINSON DISEASE (HCC): ICD-10-CM

## 2021-05-14 DIAGNOSIS — R51.9 NEW ONSET HEADACHE: ICD-10-CM

## 2021-05-14 DIAGNOSIS — R53.83 FATIGUE, UNSPECIFIED TYPE: ICD-10-CM

## 2021-05-14 DIAGNOSIS — R51.9 NEW ONSET HEADACHE: Primary | ICD-10-CM

## 2021-05-14 LAB
ALBUMIN SERPL-MCNC: 4.3 G/DL (ref 3.5–5.1)
ALP BLD-CCNC: 60 U/L (ref 38–126)
ALT SERPL-CCNC: 14 U/L (ref 11–66)
ANION GAP SERPL CALCULATED.3IONS-SCNC: 12 MEQ/L (ref 8–16)
AST SERPL-CCNC: 20 U/L (ref 5–40)
BASOPHILS # BLD: 0.9 %
BASOPHILS ABSOLUTE: 0 THOU/MM3 (ref 0–0.1)
BILIRUB SERPL-MCNC: 0.4 MG/DL (ref 0.3–1.2)
BUN BLDV-MCNC: 17 MG/DL (ref 7–22)
CALCIUM SERPL-MCNC: 9.9 MG/DL (ref 8.5–10.5)
CHLORIDE BLD-SCNC: 106 MEQ/L (ref 98–111)
CO2: 25 MEQ/L (ref 23–33)
CREAT SERPL-MCNC: 0.7 MG/DL (ref 0.4–1.2)
EOSINOPHIL # BLD: 1.1 %
EOSINOPHILS ABSOLUTE: 0 THOU/MM3 (ref 0–0.4)
ERYTHROCYTE [DISTWIDTH] IN BLOOD BY AUTOMATED COUNT: 13 % (ref 11.5–14.5)
ERYTHROCYTE [DISTWIDTH] IN BLOOD BY AUTOMATED COUNT: 45.6 FL (ref 35–45)
GFR SERPL CREATININE-BSD FRML MDRD: 80 ML/MIN/1.73M2
GLUCOSE BLD-MCNC: 94 MG/DL (ref 70–108)
HCT VFR BLD CALC: 44.1 % (ref 37–47)
HEMOGLOBIN: 13.7 GM/DL (ref 12–16)
IMMATURE GRANS (ABS): 0.01 THOU/MM3 (ref 0–0.07)
IMMATURE GRANULOCYTES: 0.2 %
LYMPHOCYTES # BLD: 37.5 %
LYMPHOCYTES ABSOLUTE: 1.7 THOU/MM3 (ref 1–4.8)
MCH RBC QN AUTO: 29.6 PG (ref 26–33)
MCHC RBC AUTO-ENTMCNC: 31.1 GM/DL (ref 32.2–35.5)
MCV RBC AUTO: 95.2 FL (ref 81–99)
MONOCYTES # BLD: 6.8 %
MONOCYTES ABSOLUTE: 0.3 THOU/MM3 (ref 0.4–1.3)
NUCLEATED RED BLOOD CELLS: 0 /100 WBC
PLATELET # BLD: 234 THOU/MM3 (ref 130–400)
PMV BLD AUTO: 10.3 FL (ref 9.4–12.4)
POTASSIUM SERPL-SCNC: 4.6 MEQ/L (ref 3.5–5.2)
RBC # BLD: 4.63 MILL/MM3 (ref 4.2–5.4)
SEDIMENTATION RATE, ERYTHROCYTE: 16 MM/HR (ref 0–20)
SEG NEUTROPHILS: 53.5 %
SEGMENTED NEUTROPHILS ABSOLUTE COUNT: 2.4 THOU/MM3 (ref 1.8–7.7)
SODIUM BLD-SCNC: 143 MEQ/L (ref 135–145)
TOTAL PROTEIN: 7.2 G/DL (ref 6.1–8)
TSH SERPL DL<=0.05 MIU/L-ACNC: 2.17 UIU/ML (ref 0.4–4.2)
WBC # BLD: 4.4 THOU/MM3 (ref 4.8–10.8)

## 2021-05-14 PROCEDURE — 70553 MRI BRAIN STEM W/O & W/DYE: CPT

## 2021-05-14 PROCEDURE — 36415 COLL VENOUS BLD VENIPUNCTURE: CPT | Performed by: NURSE PRACTITIONER

## 2021-05-14 PROCEDURE — 99214 OFFICE O/P EST MOD 30 MIN: CPT | Performed by: NURSE PRACTITIONER

## 2021-05-14 PROCEDURE — 6360000004 HC RX CONTRAST MEDICATION: Performed by: NURSE PRACTITIONER

## 2021-05-14 PROCEDURE — A9579 GAD-BASE MR CONTRAST NOS,1ML: HCPCS | Performed by: NURSE PRACTITIONER

## 2021-05-14 RX ORDER — PROPRANOLOL HCL 60 MG
60 CAPSULE, EXTENDED RELEASE 24HR ORAL DAILY
Qty: 30 CAPSULE | Refills: 3 | Status: SHIPPED | OUTPATIENT
Start: 2021-05-14 | End: 2021-05-28

## 2021-05-14 RX ORDER — GABAPENTIN 100 MG/1
100 CAPSULE ORAL 3 TIMES DAILY
Qty: 90 CAPSULE | Refills: 3 | Status: SHIPPED | OUTPATIENT
Start: 2021-05-14 | End: 2021-05-28

## 2021-05-14 RX ADMIN — GADOTERIDOL 15 ML: 279.3 INJECTION, SOLUTION INTRAVENOUS at 14:42

## 2021-05-14 ASSESSMENT — ENCOUNTER SYMPTOMS
EYES NEGATIVE: 1
RESPIRATORY NEGATIVE: 1
GASTROINTESTINAL NEGATIVE: 1

## 2021-05-14 ASSESSMENT — PATIENT HEALTH QUESTIONNAIRE - PHQ9
2. FEELING DOWN, DEPRESSED OR HOPELESS: 0
SUM OF ALL RESPONSES TO PHQ QUESTIONS 1-9: 0

## 2021-05-14 NOTE — PROGRESS NOTES
Herb Bolaños is a [de-identified] y.o. female whopresents today for :  Chief Complaint   Patient presents with    Headache       HPI:     HPI  Pt reports on Sunday had a sharp sudden pain [place] the temporal side of her right head. Last about 5 seconds. Will get a few throughout the day. Will strike suddenly and then resolve. Pt reports doesn't get headaches. Pain is a 10 when it occurs.   Pain did awake her this am.       Patient Active Problem List   Diagnosis    Pure hypercholesterolemia    Vitamin D deficiency    Hypothyroidism    Osteopenia    Parkinson disease ([address]    Restless leg syndrome    Personal history of DVT (deep vein thrombosis)    Acute diverticulitis    Left lower quadrant pain    Hepatic steatosis    Tubular adenoma    S/P partial resection of colon    Acute DVT of right tibial vein (HCC)        Past Medical History:   Diagnosis Date    Diverticulosis     Fatty liver     GERD (gastroesophageal reflux disease)     was on prilosec in past    Hiatal hernia     Hx of blood clots     Hyperlipidemia     Hypertension     Hypothyroidism     Incontinence     urinary    Osteoarthritis     Parkinson's disease ([address]     PONV (postoperative nausea and vomiting)     Prolonged emergence from general anesthesia     Stress fracture     left distal radius    Tubular adenoma     colononoscopy 2010- Dr. Meghann Bravo; large sigmoid polyp colonoscopy 2016    Vertigo       Past Surgical History:   Procedure Laterality Date    BLADDER SUSPENSION      CATARACT REMOVAL  05 23 2015   Kenny Baldwin      COLONOSCOPY  2010, june 2016    Dr. Meghann Bravo- polyps removed, Dr. Evelin Jhaveri    COLONOSCOPY  03/2020    Dr. Bertha Curtis Right 08/01/2016    plate and screw in lower right leg     HEMICOLECTOMY Right 6/4/2020    ROBOTIC RIGHT COLECTOMY performed by Kellie Lindquist MD at [address] [place] [address] Right 08/23/2016    Plate inserted into rt. leg    POLYPECTOMY      [facility]- [facility]. Jaime Farris  2020    Dr. Andrew Scanlon release     Family History   Problem Relation Age of Onset    Parkinsonism Sister     Cancer Father     Colon Cancer Brother     Lung Cancer Brother     Heart Disease Paternal Grandmother         >58 yo     Social History     Tobacco Use    Smoking status: Former Smoker     Packs/day: 0.25     Years: 1.00     Pack years: 0.25     Types: Cigarettes     Quit date: 1959     Years since quittin.8    Smokeless tobacco: Never Used   Substance Use Topics    Alcohol use: No     Alcohol/week: 0.0 standard drinks      Current Outpatient Medications   Medication Sig Dispense Refill    propranolol (INDERAL LA) 60 MG extended release capsule Take 1 capsule by mouth daily 30 capsule 3    gabapentin (NEURONTIN) 100 MG capsule Take 1 capsule by mouth 3 times daily for 30 days. 90 capsule 3    rosuvastatin (CRESTOR) 10 MG tablet TAKE 1 TABLET NIGHTLY 90 tablet 3    levothyroxine (SYNTHROID) 50 MCG tablet TAKE 1 TABLET DAILY FOR LOW THYROID 90 tablet 3    Cholecalciferol (VITAMIN D3) 125 MCG (5000 UT) TABS Take by mouth      pramipexole (MIRAPEX) 0.5 MG tablet TAKE 1 TABLET TWICE A  tablet 3    NONFORMULARY Respire Pink Micro Sleep Appliance      ACETAMINOPHEN PO Take 1,000 mg by mouth daily as needed (pain)       Fexofenadine HCl (ALLEGRA ALLERGY PO) Take by mouth       No current facility-administered medications for this visit.       Allergies   Allergen Reactions    Penicillins      Unsure of reaction, childhood    Lipitor [Atorvastatin] Other (See Comments)     Muscle aches     Health Maintenance   Topic Date Due    Shingles Vaccine (2 of 3) 2016    Annual Wellness Visit (AWV)  04/15/2021    Lipid screen  2021    TSH testing  2021    Colon cancer screen colonoscopy  2023    DTaP/Tdap/Td vaccine (2 - Td) 10/19/2028  DEXA (modify frequency per FRAX score)  Completed    Flu vaccine  Completed    Pneumococcal 65+ years Vaccine  Completed    COVID-19 Vaccine  Completed    Hepatitis A vaccine  Aged Out    Hepatitis B vaccine  Aged Out    Hib vaccine  Aged Out    Meningococcal (ACWY) vaccine  Aged Out       Subjective:     Review of Systems   Constitutional: Negative. HENT: Negative. Eyes: Negative. Respiratory: Negative. Cardiovascular: Negative. Gastrointestinal: Negative. Musculoskeletal: Negative. Skin: Negative. Neurological: Positive for headaches. Objective:     Vitals:    05/14/21 0820 05/14/21 0843   BP: (!) 158/74 (!) 156/80   Site: Left Upper Arm    Position: Sitting    Cuff Size: Medium Adult    Pulse: 72    Resp: 14    Temp: 97.2 °F (36.2 °C)    TempSrc: Temporal    SpO2: 96%    Weight: 173 lb (78.5 kg)        Physical Exam  Constitutional:       Appearance: She is well-developed. HENT:      Head: Normocephalic. Right Ear: Tympanic membrane and external ear normal.      Left Ear: Tympanic membrane and external ear normal.      Nose: Nose normal.   Neck:      Musculoskeletal: Normal range of motion and neck supple. Cardiovascular:      Rate and Rhythm: Normal rate and regular rhythm. Heart sounds: Normal heart sounds. No murmur. No friction rub. No gallop. Pulmonary:      Effort: Pulmonary effort is normal.      Breath sounds: Normal breath sounds. No wheezing or rales. Abdominal:      General: Bowel sounds are normal.      Palpations: Abdomen is soft. Tenderness: There is no abdominal tenderness. There is no guarding. Musculoskeletal: Normal range of motion. Lymphadenopathy:      Cervical: No cervical adenopathy. Skin:     General: Skin is warm. Neurological:      Mental Status: She is alert and oriented to person, place, and time. Deep Tendon Reflexes: Reflexes are normal and symmetric. Assessment:      Diagnosis Orders   1.  New

## 2021-05-28 ENCOUNTER — OFFICE VISIT (OUTPATIENT)
Dept: FAMILY MEDICINE CLINIC | Age: 81
End: 2021-05-28
Payer: MEDICARE

## 2021-05-28 VITALS
TEMPERATURE: 97.9 F | HEART RATE: 58 BPM | BODY MASS INDEX: 34.02 KG/M2 | RESPIRATION RATE: 18 BRPM | WEIGHT: 174.2 LBS | SYSTOLIC BLOOD PRESSURE: 124 MMHG | DIASTOLIC BLOOD PRESSURE: 78 MMHG

## 2021-05-28 DIAGNOSIS — R51.9 NEW ONSET HEADACHE: ICD-10-CM

## 2021-05-28 DIAGNOSIS — J30.1 SEASONAL ALLERGIC RHINITIS DUE TO POLLEN: Primary | ICD-10-CM

## 2021-05-28 DIAGNOSIS — G20 PARKINSON DISEASE (HCC): ICD-10-CM

## 2021-05-28 PROCEDURE — 96372 THER/PROPH/DIAG INJ SC/IM: CPT | Performed by: NURSE PRACTITIONER

## 2021-05-28 PROCEDURE — 99214 OFFICE O/P EST MOD 30 MIN: CPT | Performed by: NURSE PRACTITIONER

## 2021-05-28 RX ORDER — TRIAMCINOLONE ACETONIDE 40 MG/ML
40 INJECTION, SUSPENSION INTRA-ARTICULAR; INTRAMUSCULAR ONCE
Status: COMPLETED | OUTPATIENT
Start: 2021-05-28 | End: 2021-05-28

## 2021-05-28 RX ADMIN — TRIAMCINOLONE ACETONIDE 40 MG: 40 INJECTION, SUSPENSION INTRA-ARTICULAR; INTRAMUSCULAR at 09:45

## 2021-05-28 SDOH — ECONOMIC STABILITY: FOOD INSECURITY: WITHIN THE PAST 12 MONTHS, THE FOOD YOU BOUGHT JUST DIDN'T LAST AND YOU DIDN'T HAVE MONEY TO GET MORE.: NEVER TRUE

## 2021-05-28 ASSESSMENT — SOCIAL DETERMINANTS OF HEALTH (SDOH): HOW HARD IS IT FOR YOU TO PAY FOR THE VERY BASICS LIKE FOOD, HOUSING, MEDICAL CARE, AND HEATING?: NOT HARD AT ALL

## 2021-05-28 ASSESSMENT — ENCOUNTER SYMPTOMS
GASTROINTESTINAL NEGATIVE: 1
RESPIRATORY NEGATIVE: 1
EYES NEGATIVE: 1

## 2021-05-28 NOTE — PROGRESS NOTES
Jonathan Brown MD at [address] Right 2016    Plate inserted into rt. leg    POLYPECTOMY      [facility]- Dr. Amador Collier  2020    Dr. Jose Cordero release     Family History   Problem Relation Age of Onset    Parkinsonism Sister     Cancer Father     Colon Cancer Brother     Lung Cancer Brother     Heart Disease Paternal Grandmother         >58 yo     Social History     Tobacco Use    Smoking status: Former Smoker     Packs/day: 0.25     Years: 1.00     Pack years: 0.25     Types: Cigarettes     Quit date: 1959     Years since quittin.9    Smokeless tobacco: Never Used   Substance Use Topics    Alcohol use: No     Alcohol/week: 0.0 standard drinks      Current Outpatient Medications   Medication Sig Dispense Refill    rosuvastatin (CRESTOR) 10 MG tablet TAKE 1 TABLET NIGHTLY 90 tablet 3    levothyroxine (SYNTHROID) 50 MCG tablet TAKE 1 TABLET DAILY FOR LOW THYROID 90 tablet 3    Cholecalciferol (VITAMIN D3) 125 MCG (5000 UT) TABS Take by mouth      Fexofenadine HCl (ALLEGRA ALLERGY PO) Take by mouth      pramipexole (MIRAPEX) 0.5 MG tablet TAKE 1 TABLET TWICE A  tablet 3    NONFORMULARY Respire Pink Micro Sleep Appliance      ACETAMINOPHEN PO Take 1,000 mg by mouth daily as needed (pain)        No current facility-administered medications for this visit.      Allergies   Allergen Reactions    Penicillins      Unsure of reaction, childhood    Lipitor [Atorvastatin] Other (See Comments)     Muscle aches     Health Maintenance   Topic Date Due    Shingles Vaccine (2 of 3) 2016    Annual Wellness Visit (AWV)  04/15/2021    Lipid screen  2021    TSH testing  2022    Colon cancer screen colonoscopy  2023    DTaP/Tdap/Td vaccine (2 - Td) 10/19/2028    DEXA (modify frequency per FRAX score)  Completed    Flu vaccine  Completed    Pneumococcal 65+ years Vaccine Completed    COVID-19 Vaccine  Completed    Hepatitis A vaccine  Aged Out    Hepatitis B vaccine  Aged Out    Hib vaccine  Aged Out    Meningococcal (ACWY) vaccine  Aged Out       Subjective:     Review of Systems   Constitutional: Negative. HENT: Positive for congestion. Eyes: Negative. Respiratory: Negative. Cardiovascular: Negative. Gastrointestinal: Negative. Musculoskeletal: Negative. Skin: Negative. Neurological: Negative. Objective:     Vitals:    05/28/21 0847   BP: 124/78   Site: Left Upper Arm   Position: Sitting   Cuff Size: Large Adult   Pulse: 58   Resp: 18   Temp: 97.9 °F (36.6 °C)   TempSrc: Temporal   Weight: 174 lb 3.2 oz (79 kg)       Physical Exam  Constitutional:       Appearance: She is well-developed. HENT:      Head: Normocephalic. Right Ear: Tympanic membrane and external ear normal.      Ears:        Nose: Nose normal.   Cardiovascular:      Rate and Rhythm: Normal rate and regular rhythm. Heart sounds: Normal heart sounds. No murmur heard. No friction rub. No gallop. Pulmonary:      Effort: Pulmonary effort is normal.      Breath sounds: Normal breath sounds. No wheezing or rales. Abdominal:      General: Bowel sounds are normal.      Palpations: Abdomen is soft. Tenderness: There is no abdominal tenderness. There is no guarding. Musculoskeletal:         General: Normal range of motion. Cervical back: Normal range of motion and neck supple. Lymphadenopathy:      Cervical: No cervical adenopathy. Skin:     General: Skin is warm. Neurological:      Mental Status: She is alert and oriented to person, place, and time. Deep Tendon Reflexes: Reflexes are normal and symmetric. Assessment:      Diagnosis Orders   1. Seasonal allergic rhinitis due to pollen  triamcinolone acetonide (KENALOG-40) injection 40 mg   2. Parkinson disease ([address]     3. New onset headache         Plan:      No follow-ups on file.      No orders of the defined types were placed in this encounter. Orders Placed This Encounter   Medications    triamcinolone acetonide (KENALOG-40) injection 40 mg      Ok to stop inderal  Kenalog injection for congestion  Advised to call back directly if there are further questions, or if these symptoms fail to improve as anticipated or worsen. Patient given educational materials - seepatient instructions. Discussed use, benefit, and side effects of prescribed medications. All patient questions answered. Pt voiced understanding. Patient agreed withtreatment plan. Follow up as directed.      Electronically signed by RISSA Coffman CNP on 5/28/2021 at 1:04 PM

## 2021-06-22 ENCOUNTER — HOSPITAL ENCOUNTER (EMERGENCY)
Age: 81
Discharge: HOME | End: 2021-06-22
Attending: EMERGENCY MEDICINE
Payer: MEDICARE

## 2021-06-22 ENCOUNTER — APPOINTMENT (OUTPATIENT)
Dept: CT IMAGING | Age: 81
End: 2021-06-22
Payer: MEDICARE

## 2021-06-22 VITALS
OXYGEN SATURATION: 97 % | HEIGHT: 60 IN | TEMPERATURE: 96.9 F | WEIGHT: 170 LBS | BODY MASS INDEX: 33.38 KG/M2 | RESPIRATION RATE: 17 BRPM | DIASTOLIC BLOOD PRESSURE: 56 MMHG | HEART RATE: 58 BPM | SYSTOLIC BLOOD PRESSURE: 150 MMHG

## 2021-06-22 DIAGNOSIS — R42 DIZZINESS: Primary | ICD-10-CM

## 2021-06-22 LAB
ALBUMIN SERPL-MCNC: 3.6 GM/DL (ref 3.4–5)
ALP BLD-CCNC: 73 U/L (ref 46–116)
ALT SERPL-CCNC: 29 U/L (ref 14–63)
ANION GAP: 8 MEQ/L (ref 8–16)
AST SERPL-CCNC: 22 U/L (ref 15–37)
BASOPHILS # BLD: 0.2 % (ref 0–3)
BILIRUB SERPL-MCNC: 0.7 MG/DL (ref 0.2–1)
BUN BLDV-MCNC: 16 MG/DL (ref 7–18)
CHLORIDE BLD-SCNC: 103 MEQ/L (ref 98–107)
CO2: 29 MEQ/L (ref 21–32)
CREAT SERPL-MCNC: 1 MG/DL (ref 0.6–1.3)
EOSINOPHILS RELATIVE PERCENT: 1.2 % (ref 0–4)
GFR, ESTIMATED: 57 ML/MIN/1.73M2
GLUCOSE BLD-MCNC: 114 MG/DL (ref 74–106)
HCT VFR BLD CALC: 42.3 % (ref 37–47)
HEMOGLOBIN: 13.9 GM/DL (ref 12–16)
LYMPHOCYTES # BLD: 16 % (ref 15–47)
MAGNESIUM: 2.1 MG/DL (ref 1.8–2.4)
MCH RBC QN AUTO: 30.2 PG (ref 27–31)
MCHC RBC AUTO-ENTMCNC: 32.9 GM/DL (ref 33–37)
MCV RBC AUTO: 91.9 FL (ref 81–99)
MONOCYTES: 4.9 % (ref 0–12)
PDW BLD-RTO: 14.2 % (ref 11.5–14.5)
PLATELET # BLD: 183 THOU/MM3 (ref 130–400)
PMV BLD AUTO: 7.4 FL (ref 7.4–10.4)
POC CALCIUM: 9.1 MG/DL (ref 8.5–10.1)
POTASSIUM SERPL-SCNC: 4 MEQ/L (ref 3.5–5.1)
RBC # BLD: 4.61 MILL/MM3 (ref 4.2–5.4)
SEGS: 77.7 % (ref 43–75)
SODIUM BLD-SCNC: 140 MEQ/L (ref 136–145)
TOTAL PROTEIN: 7.2 GM/DL (ref 6.4–8.2)
WBC # BLD: 6.2 THOU/MM3 (ref 4.8–10.8)

## 2021-06-22 PROCEDURE — 83735 ASSAY OF MAGNESIUM: CPT

## 2021-06-22 PROCEDURE — 6360000002 HC RX W HCPCS: Performed by: EMERGENCY MEDICINE

## 2021-06-22 PROCEDURE — 85025 COMPLETE CBC W/AUTO DIFF WBC: CPT

## 2021-06-22 PROCEDURE — 99284 EMERGENCY DEPT VISIT MOD MDM: CPT

## 2021-06-22 PROCEDURE — 36415 COLL VENOUS BLD VENIPUNCTURE: CPT

## 2021-06-22 PROCEDURE — 70450 CT HEAD/BRAIN W/O DYE: CPT

## 2021-06-22 PROCEDURE — 80053 COMPREHEN METABOLIC PANEL: CPT

## 2021-06-22 PROCEDURE — 96374 THER/PROPH/DIAG INJ IV PUSH: CPT

## 2021-06-22 RX ORDER — ONDANSETRON 2 MG/ML
4 INJECTION INTRAMUSCULAR; INTRAVENOUS ONCE
Status: COMPLETED | OUTPATIENT
Start: 2021-06-22 | End: 2021-06-22

## 2021-06-22 RX ORDER — MECLIZINE HYDROCHLORIDE 25 MG/1
25 TABLET ORAL 3 TIMES DAILY PRN
Qty: 15 TABLET | Refills: 0 | Status: SHIPPED | OUTPATIENT
Start: 2021-06-22 | End: 2021-07-07

## 2021-06-22 RX ORDER — MECLIZINE HYDROCHLORIDE CHEWABLE TABLETS 25 MG/1
25 TABLET, CHEWABLE ORAL ONCE
Status: COMPLETED | OUTPATIENT
Start: 2021-06-22 | End: 2021-06-22

## 2021-06-22 RX ORDER — ONDANSETRON 4 MG/1
4 TABLET, ORALLY DISINTEGRATING ORAL EVERY 8 HOURS PRN
Qty: 15 TABLET | Refills: 0 | Status: SHIPPED | OUTPATIENT
Start: 2021-06-22

## 2021-06-22 RX ADMIN — ONDANSETRON 4 MG: 2 INJECTION INTRAMUSCULAR; INTRAVENOUS at 08:15

## 2021-06-22 RX ADMIN — MECLIZINE HYDROCHLORIDE CHEWABLE TABLETS 25 MG: 25 TABLET, CHEWABLE ORAL at 08:08

## 2021-06-22 NOTE — ED PROVIDER NOTES
[facility], [place]  [address] [facility],[address] [address]  Phone: [address]    Chief Complaint   Patient presents with    Dizziness    Nausea       HPI    Vidal De Souza is a [de-identified] y.o. female who presents above-noted complaint. Patient's been doing pretty well. She has had some neuro issues dizziness in the past including vertigo. She has Parkinson's. Family thinks her Parkinson's are acting up more lately the last few weeks. She subsequently last night before bed had a dizzy spell. Went to bed. Rolled over in bed at 5:00 in the morning and had another episode of dizziness and nausea. She difficulty ambulating today.   Denies severe headache fever chills other issues or problems    PAST MEDICAL HISTORY    Past Medical History:   Diagnosis Date    Diverticulosis     Fatty liver     GERD (gastroesophageal reflux disease)     was on prilosec in past    Hiatal hernia     Hx of blood clots     Hyperlipidemia     Hypertension     Hypothyroidism     Incontinence     urinary    Osteoarthritis     Parkinson's disease (HCC)     PONV (postoperative nausea and vomiting)     Prolonged emergence from general anesthesia     Stress fracture     left distal radius    Tubular adenoma     colononoscopy 2010- Dr. Merry Franco; large sigmoid polyp colonoscopy 2016    Vertigo        SURGICAL HISTORY    Past Surgical History:   Procedure Laterality Date    BLADDER SUSPENSION      CATARACT REMOVAL  05 23 2015   Suha Sheridan      COLONOSCOPY  2010, june 2016    Dr. Merry Franco- polyps removed, Dr. Lizbet Cowan  03/2020    Dr. Jessa Alejo Right 08/01/2016    plate and screw in lower right leg     HEMICOLECTOMY Right 6/4/2020    ROBOTIC RIGHT COLECTOMY performed by Simon Singh MD at [address] [place] 08/23/2016    Plate inserted into rt. leg    POLYPECTOMY      [facility]- Dr. Eli Vieira  2020    Dr. Dipti Garcia release       CURRENT MEDICATIONS    Current Outpatient Rx   Medication Sig Dispense Refill    meclizine (ANTIVERT) 25 MG tablet Take 1 tablet by mouth 3 times daily as needed for Dizziness 15 tablet 0    ondansetron (ZOFRAN ODT) 4 MG disintegrating tablet Take 1 tablet by mouth every 8 hours as needed for Nausea or Vomiting 15 tablet 0    rosuvastatin (CRESTOR) 10 MG tablet TAKE 1 TABLET NIGHTLY 90 tablet 3    levothyroxine (SYNTHROID) 50 MCG tablet TAKE 1 TABLET DAILY FOR LOW THYROID 90 tablet 3    Cholecalciferol (VITAMIN D3) 125 MCG (5000 UT) TABS Take by mouth      Fexofenadine HCl (ALLEGRA ALLERGY PO) Take by mouth      pramipexole (MIRAPEX) 0.5 MG tablet TAKE 1 TABLET TWICE A  tablet 3    NONFORMULARY Respire Pink Micro Sleep Appliance      ACETAMINOPHEN PO Take 1,000 mg by mouth daily as needed (pain)          ALLERGIES    Allergies   Allergen Reactions    Penicillins      Unsure of reaction, childhood    Lipitor [Atorvastatin] Other (See Comments)     Muscle aches       FAMILY HISTORY    Family History   Problem Relation Age of Onset    Parkinsonism Sister     Cancer Father     Colon Cancer Brother     Lung Cancer Brother     Heart Disease Paternal Grandmother         >56 yo       SOCIAL HISTORY    Social History     Socioeconomic History    Marital status:      Spouse name: Amari Robledo Number of children: 2    Years of education: 15    Highest education level: None   Occupational History    Occupation: retired   Tobacco Use    Smoking status: Former Smoker     Packs/day: 0.25     Years: 1.00     Pack years: 0.25     Types: Cigarettes     Quit date: 1959     Years since quittin.9    Smokeless tobacco: Never Used   Vaping Use    Vaping Use: Never used   Substance and Sexual Activity    Alcohol use: No     Alcohol/week: 0.0 standard drinks    Drug use: No    Sexual activity: Never   Other Topics Concern    None   Social History Narrative    None     Social Determinants of Health     Financial Resource Strain: Low Risk     Difficulty of Paying Living Expenses: Not hard at all   Food Insecurity: No Food Insecurity    Worried About Running Out of Food in the Last Year: Never true    Aniya of Food in the Last Year: Never true   Transportation Needs:     Lack of Transportation (Medical):  Lack of Transportation (Non-Medical):    Physical Activity:     Days of Exercise per Week:     Minutes of Exercise per Session:    Stress:     Feeling of Stress :    Social Connections:     Frequency of Communication with Friends and Family:     Frequency of Social Gatherings with Friends and Family:     Attends Hinduism Services:     Active Member of Clubs or Organizations:     Attends Club or Organization Meetings:     Marital Status:    Intimate Partner Violence:     Fear of Current or Ex-Partner:     Emotionally Abused:     Physically Abused:     Sexually Abused:        REVIEW OF SYSTEMS    Positive for dizziness. And nausea. No neck pain chest pain or shortness of breath. Positive for restless leg syndrome tremulous legs. All systems negative except as marked. PHYSICAL EXAM    VITAL SIGNS: BP (!) 146/74   Pulse 55   Temp 96.9 °F (36.1 °C) (Temporal)   Resp 11   Ht 5' (1.524 m)   Wt 170 lb (77.1 kg)   SpO2 95%   BMI 33.20 kg/m²    Constitutional:  Alert not toxic or ill, able to give coherent history, sitting calmly looking straight ahead. HENT: COVID mask protection in place normocephalic, Atraumatic, mask lowered to assess  Bilateral external ears normal, Oropharynx moist, No oral exudates, Nose normal.  Cervical Spine: Normal range of motion,  No stridor.  No tenderness, Supple,  Eyes:  No discharge or  Swelling,Conjunctiva normal, PERRL, EOMI,  Respiratory: No respiratory distress, Normal breath sounds, No wheezing, No chest tenderness. Cardiovascular:  Normal heart rate, Normal rhythm, No murmurs, No rubs, No gallops. GI:  No reproducible pain, Bowel sounds normal, Soft, No masses, No pulsatile masses. No tenderness  Musculoskeletal:  Intact distal pulses, No edema, No tenderness, No cyanosis, No clubbing. Good range of motion in all major joints. No tenderness to palpation or major deformities noted. Back:No tenderness. Integument:  Warm, Dry, No erythema, No rash (on exposed areas)   Lymphatic:  No lymphadenopathy noted. Neurologic:  Alert & oriented x 3, Normal motor function, Normal sensory function, No focal deficits noted. Finger-nose is normal  Psychiatric:  Affect normal, Judgment normal, Mood normal.     EKG                      RADIOLOGY    CT HEAD WO CONTRAST   Final Result       1. Stable CT appearance the brain. No CT evidence of an acute process. 2. Moderate severity chronic small vessel ischemic changes. 3. Given the patient's symptoms, a brain MRI is recommended. **This report has been created using voice recognition software. It may contain minor errors which are inherent in voice recognition technology. **      Final report electronically signed by Dr. Bashir Cabello on 6/22/2021 8:47 AM          PROCEDURES    none      CONSULTS:  None      CRITICAL CARE:  None    SCREENINGS  BP (!) 146/74   Pulse 55   Temp 96.9 °F (36.1 °C) (Temporal)   Resp 11   Ht 5' (1.524 m)   Wt 170 lb (77.1 kg)   SpO2 95%   BMI 33.20 kg/m²        Screening For Hypertension and Follow-up (#317)   previously diagnosed with hypertension and not applicable for screen      Screening For Tobacco Use and Cessation Intervention (#226):   reports that she quit smoking about 61 years ago. Her smoking use included cigarettes. She has a 0.25 pack-year smoking history.  She has never used smokeless tobacco.  Non-smoker not applicable for screen    ED COURSE & MEDICAL DECISION MAKING    Pertinent Labs &

## 2021-06-22 NOTE — ED NOTES
Pt. Resting quietly in semi-davis's postion, daughter at bedside. Denies any needs at this time.       Mary Crump RN  06/22/21 3261

## 2021-06-22 NOTE — ED NOTES
Pt. Presents to ED via private auto, assisted into w/c and taken to Tr. 1, placed on continous cardiac monitor. Pt. C/o severe vertigo since last night prior to going to bed.       Marilu Granados RN  06/22/21 4596

## 2021-06-23 ENCOUNTER — TELEPHONE (OUTPATIENT)
Dept: NEUROLOGY | Age: 81
End: 2021-06-23

## 2021-06-23 NOTE — TELEPHONE ENCOUNTER
Patient called stating she is having increase in RLS symptoms over the past couple of months. She is taking Mirapex 0.5 mg 2 times a day. She is asking if the medication can be increased. Please advise. Thank you.

## 2021-06-23 NOTE — TELEPHONE ENCOUNTER
I suggest changing the Mirapex to 0.5 mg three times a day. (use own stock for now). Update us on how its doing before changing with pharmacy.    Syeda Kinney MD

## 2021-06-25 ENCOUNTER — TELEPHONE (OUTPATIENT)
Dept: FAMILY MEDICINE CLINIC | Age: 81
End: 2021-06-25

## 2021-06-25 NOTE — TELEPHONE ENCOUNTER
----- Message from Bria Hanna sent at 6/23/2021  3:45 PM EDT -----  Subject: Appointment Request    Reason for Call: Routine ED Follow Up Visit    QUESTIONS  Type of Appointment? Established Patient  Reason for appointment request? No appointments available during search  Additional Information for Provider? Ishmaeltopher Rojas was at the ED 6/22/21 and would   like a follow up appointment with Dr. Marylou Rivera understands   that her Dr. Fabricio Peralta is no longer at the office and would like to see Dr. Echeverria Form was seen in the ED for dizziness at the [facility] Emergency   Care  ---------------------------------------------------------------------------  --------------  [address]  What is the best way for the office to contact you? OK to leave message on   voicemail  Preferred Call Back Phone Number? [phone]  ---------------------------------------------------------------------------  --------------  SCRIPT ANSWERS  Relationship to Patient? Self  Appointment reason? Well Care/Follow Ups  Select a Well Care/Follow Ups appointment reason? Adult ED Follow Up   [Emergency Room, Emergency Department]  (Patient requests to see provider urgently. )? No  Do you have any questions for your primary care provider that need to be   answered prior to your appointment? No  Have you been diagnosed with, awaiting test results for, or told that you   are suspected of having COVID-19 (Coronavirus)? (If patient has tested   negative or was tested as a requirement for work, school, or travel and   not based on symptoms, answer no)? No  Do you currently have flu-like symptoms including fever or chills, cough,   shortness of breath, difficulty breathing, or new loss of taste or smell? No  Have you had close contact with someone with COVID-19 in the last 14 days? No  (Service Expert  click yes below to proceed with [employer] As Usual   Scheduling)?  Yes

## 2021-07-01 ENCOUNTER — OFFICE VISIT (OUTPATIENT)
Dept: FAMILY MEDICINE CLINIC | Age: 81
End: 2021-07-01
Payer: MEDICARE

## 2021-07-01 VITALS
WEIGHT: 173 LBS | BODY MASS INDEX: 33.79 KG/M2 | RESPIRATION RATE: 14 BRPM | TEMPERATURE: 97 F | DIASTOLIC BLOOD PRESSURE: 68 MMHG | SYSTOLIC BLOOD PRESSURE: 138 MMHG | OXYGEN SATURATION: 98 % | HEART RATE: 65 BPM

## 2021-07-01 DIAGNOSIS — R42 VERTIGO: ICD-10-CM

## 2021-07-01 DIAGNOSIS — G20 PARKINSON DISEASE (HCC): Primary | ICD-10-CM

## 2021-07-01 PROCEDURE — 99214 OFFICE O/P EST MOD 30 MIN: CPT | Performed by: NURSE PRACTITIONER

## 2021-07-01 PROCEDURE — 1111F DSCHRG MED/CURRENT MED MERGE: CPT | Performed by: NURSE PRACTITIONER

## 2021-07-01 ASSESSMENT — ENCOUNTER SYMPTOMS
RESPIRATORY NEGATIVE: 1
GASTROINTESTINAL NEGATIVE: 1
EYES NEGATIVE: 1

## 2021-07-01 NOTE — PROGRESS NOTES
Shy Rousseau is a [de-identified] y.o. female whopresents today for :  Chief Complaint   Patient presents with    Follow-Up from Hospital     dizziness -     Bleeding/Bruising     left upper arm x 3 weeks       HPI:     HPI  Pt on 6/22 had severe vertigo. Would not subside. Went to ER. Pt had neg ct scan  Given zofran and antivert. Still has periods of vertigo and also periods of unsteadiness.   Does have parkinsons as well      Patient Active Problem List   Diagnosis    Pure hypercholesterolemia    Vitamin D deficiency    Hypothyroidism    Osteopenia    Parkinson disease ([address]    Restless leg syndrome    Personal history of DVT (deep vein thrombosis)    Acute diverticulitis    Left lower quadrant pain    Hepatic steatosis    Tubular adenoma    S/P partial resection of colon    Acute DVT of right tibial vein (HCC)        Past Medical History:   Diagnosis Date    Diverticulosis     Fatty liver     GERD (gastroesophageal reflux disease)     was on prilosec in past    Hiatal hernia     Hx of blood clots     Hyperlipidemia     Hypertension     Hypothyroidism     Incontinence     urinary    Osteoarthritis     Parkinson's disease ([address]     PONV (postoperative nausea and vomiting)     Prolonged emergence from general anesthesia     Stress fracture     left distal radius    Tubular adenoma     colononoscopy 2010- Dr. Anibal Ochoa; large sigmoid polyp colonoscopy 2016    Vertigo       Past Surgical History:   Procedure Laterality Date    BLADDER SUSPENSION      CATARACT REMOVAL  05 23 2015   Merary Aleman      COLONOSCOPY  2010, june 2016    Dr. Anibal Ochoa- polyps removed, Dr. Juan Draper    COLONOSCOPY  03/2020    Dr. Marcus Christianson Right 08/01/2016    plate and screw in lower right leg     HEMICOLECTOMY Right 6/4/2020    ROBOTIC RIGHT COLECTOMY performed by Hugo Bailey MD at [place] [address] Right 08/23/2016    Plate inserted into rt. leg    POLYPECTOMY      [facility]- Dr. Kassidy Black  2020    Dr. Amadou Healy release     Family History   Problem Relation Age of Onset    Parkinsonism Sister     Cancer Father     Colon Cancer Brother     Lung Cancer Brother     Heart Disease Paternal Grandmother         >58 yo     Social History     Tobacco Use    Smoking status: Former Smoker     Packs/day: 0.25     Years: 1.00     Pack years: 0.25     Types: Cigarettes     Quit date: 1959     Years since quittin.0    Smokeless tobacco: Never Used   Substance Use Topics    Alcohol use: No     Alcohol/week: 0.0 standard drinks      Current Outpatient Medications   Medication Sig Dispense Refill    meclizine (ANTIVERT) 25 MG tablet Take 1 tablet by mouth 3 times daily as needed for Dizziness 15 tablet 0    ondansetron (ZOFRAN ODT) 4 MG disintegrating tablet Take 1 tablet by mouth every 8 hours as needed for Nausea or Vomiting 15 tablet 0    rosuvastatin (CRESTOR) 10 MG tablet TAKE 1 TABLET NIGHTLY 90 tablet 3    levothyroxine (SYNTHROID) 50 MCG tablet TAKE 1 TABLET DAILY FOR LOW THYROID 90 tablet 3    Cholecalciferol (VITAMIN D3) 125 MCG (5000 UT) TABS Take by mouth      Fexofenadine HCl (ALLEGRA ALLERGY PO) Take by mouth      pramipexole (MIRAPEX) 0.5 MG tablet TAKE 1 TABLET TWICE A DAY (Patient taking differently: 0.5 mg Taking 1 tablet three times a day) 180 tablet 3    NONFORMULARY Respire Pink Micro Sleep Appliance      ACETAMINOPHEN PO Take 1,000 mg by mouth daily as needed (pain)        No current facility-administered medications for this visit.      Allergies   Allergen Reactions    Penicillins      Unsure of reaction, childhood    Lipitor [Atorvastatin] Other (See Comments)     Muscle aches     Health Maintenance   Topic Date Due    Shingles Vaccine (2 of 3) 2016    Annual Wellness Visit (AWV)  04/15/2021    Flu vaccine (1) 09/01/2021    Lipid screen  12/14/2021    TSH testing  05/14/2022    Colon cancer screen colonoscopy  03/02/2023    DTaP/Tdap/Td vaccine (2 - Td or Tdap) 10/19/2028    DEXA (modify frequency per FRAX score)  Completed    Pneumococcal 65+ years Vaccine  Completed    COVID-19 Vaccine  Completed    Hepatitis A vaccine  Aged Out    Hepatitis B vaccine  Aged Out    Hib vaccine  Aged Out    Meningococcal (ACWY) vaccine  Aged Out       Subjective:     Review of Systems   Constitutional: Negative. HENT: Negative. Eyes: Negative. Respiratory: Negative. Cardiovascular: Negative. Gastrointestinal: Negative. Musculoskeletal: Negative. Skin: Negative. Neurological: Positive for dizziness, tremors and light-headedness. Objective:     Vitals:    07/01/21 1536   BP: 138/68   Site: Right Upper Arm   Position: Sitting   Cuff Size: Large Adult   Pulse: 65   Resp: 14   Temp: 97 °F (36.1 °C)   TempSrc: Temporal   SpO2: 98%   Weight: 173 lb (78.5 kg)       Physical Exam  Constitutional:       Appearance: She is well-developed. HENT:      Head: Normocephalic. Right Ear: Tympanic membrane and external ear normal.      Left Ear: Tympanic membrane and external ear normal.      Nose: Nose normal.   Cardiovascular:      Rate and Rhythm: Normal rate and regular rhythm. Heart sounds: Normal heart sounds. No murmur heard. No friction rub. No gallop. Pulmonary:      Effort: Pulmonary effort is normal.      Breath sounds: Normal breath sounds. No wheezing or rales. Abdominal:      General: Bowel sounds are normal.      Palpations: Abdomen is soft. Tenderness: There is no abdominal tenderness. There is no guarding. Musculoskeletal:         General: Normal range of motion. Arms:       Cervical back: Normal range of motion and neck supple. Lymphadenopathy:      Cervical: No cervical adenopathy. Skin:     General: Skin is warm.    Neurological:      Mental Status: She is alert and oriented to person, place, and time. Deep Tendon Reflexes: Reflexes are normal and symmetric. Comments: Tremor to right leg           Assessment:      Diagnosis Orders   1. Parkinson disease ([facility]     2. Vertigo  External Referral To Physical Therapy    [place] DISCHARGE MEDS RECONCILED W/ CURRENT OUTPATIENT MED LIST       Plan:      No follow-ups on file. Orders Placed This Encounter   Procedures    External Referral To Physical Therapy     Referral Priority:   Routine     Referral Type:   Eval and Treat     Referral Location:   [address] [place] [address] Medicine     Requested Specialty:   Physical Therapy     Number of Visits Requested:   1    [place] DISCHARGE MEDS RECONCILED W/ CURRENT OUTPATIENT MED LIST     No orders of the defined types were placed in this encounter. I suspect combination of vertigo and parkinson. Recommend to get PT  Patient given educational materials - seepatient instructions. Discussed use, benefit, and side effects of prescribed medications. All patient questions answered. Pt voiced understanding. Patient agreed withtreatment plan. Follow up as directed.      Electronically signed by RISSA Daniel CNP on 7/1/2021 at 5:36 PM

## 2021-07-12 ENCOUNTER — TELEPHONE (OUTPATIENT)
Dept: NEUROLOGY | Age: 81
End: 2021-07-12

## 2021-07-12 NOTE — TELEPHONE ENCOUNTER
Try taking two tablets of her own stock of Mirapex at night in place of one. Update us on how it works.    Richard Guy MD

## 2021-07-12 NOTE — TELEPHONE ENCOUNTER
Patient called stating she is taking Mirapex 0.5 mg 3 times a day. She feels the increase in medication is helping, but she continues to have leg jumping at night. Please advise. Thank you.

## 2021-07-19 ENCOUNTER — TELEPHONE (OUTPATIENT)
Dept: NEUROLOGY | Age: 81
End: 2021-07-19

## 2021-07-19 NOTE — TELEPHONE ENCOUNTER
Patient called stating she is taking Mirapex 0.5 mg in am and afternoon and 1 mg at night for the past 1 week. She started getting nausea that is getting worse after the dose increase. Spoke with Rox Teague CNP who stated to go back on previous dose of Mriapex 0.5 m g 3 times a day and update office in 1 week. Per Rox Teague CNP, may consider Neupro patch as next step. Patient notified and verbalized understanding.

## 2021-08-12 ENCOUNTER — OFFICE VISIT (OUTPATIENT)
Dept: NEUROLOGY | Age: 81
End: 2021-08-12
Payer: MEDICARE

## 2021-08-12 VITALS
SYSTOLIC BLOOD PRESSURE: 132 MMHG | DIASTOLIC BLOOD PRESSURE: 82 MMHG | HEART RATE: 71 BPM | OXYGEN SATURATION: 98 % | WEIGHT: 175 LBS | BODY MASS INDEX: 34.36 KG/M2 | HEIGHT: 60 IN

## 2021-08-12 DIAGNOSIS — R79.9 ABNORMAL FINDING OF BLOOD CHEMISTRY, UNSPECIFIED: ICD-10-CM

## 2021-08-12 DIAGNOSIS — R42 DIZZINESS: ICD-10-CM

## 2021-08-12 DIAGNOSIS — G20 PARKINSON DISEASE (HCC): Primary | ICD-10-CM

## 2021-08-12 DIAGNOSIS — G25.81 RESTLESS LEG SYNDROME: ICD-10-CM

## 2021-08-12 PROCEDURE — 99214 OFFICE O/P EST MOD 30 MIN: CPT | Performed by: NURSE PRACTITIONER

## 2021-08-12 NOTE — PROGRESS NOTES
NEUROLOGY OUT PATIENT FOLLOW UP NOTE:  8/12/20218:36 AM    Shy Rousseau is here for follow up for parkinson's disease, restless leg syndrome. ROS:  Respiratory : no cough, no shortness of breath  Cardiac: no chest pain. No palpitations. Renal : no flank pain, no hematuria    Skin: no rash      Allergies   Allergen Reactions    Penicillins      Unsure of reaction, childhood    Lipitor [Atorvastatin] Other (See Comments)     Muscle aches       Current Outpatient Medications:     rosuvastatin (CRESTOR) 10 MG tablet, TAKE 1 TABLET NIGHTLY, Disp: 90 tablet, Rfl: 3    levothyroxine (SYNTHROID) 50 MCG tablet, TAKE 1 TABLET DAILY FOR LOW THYROID, Disp: 90 tablet, Rfl: 3    Cholecalciferol (VITAMIN D3) 125 MCG (5000 UT) TABS, Take by mouth, Disp: , Rfl:     pramipexole (MIRAPEX) 0.5 MG tablet, TAKE 1 TABLET TWICE A DAY (Patient taking differently: 0.5 mg 3 times daily Taking 1 tablet three times a day), Disp: 180 tablet, Rfl: 3    NONFORMULARY, Respire Pink Micro Sleep Appliance, Disp: , Rfl:     ACETAMINOPHEN PO, Take 1,000 mg by mouth daily as needed (pain) , Disp: , Rfl:     ondansetron (ZOFRAN ODT) 4 MG disintegrating tablet, Take 1 tablet by mouth every 8 hours as needed for Nausea or Vomiting (Patient not taking: Reported on 8/12/2021), Disp: 15 tablet, Rfl: 0    Fexofenadine HCl (ALLEGRA ALLERGY PO), Take by mouth (Patient not taking: Reported on 8/12/2021), Disp: , Rfl:     I reviewed the past medical history, allergies, medications, social history and family history. PE:   Vitals:    08/12/21 0823   BP: 132/82   Site: Left Upper Arm   Position: Sitting   Cuff Size: Medium Adult   Pulse: 71   SpO2: 98%   Weight: 175 lb (79.4 kg)   Height: 5' (1.524 m)     General Appearance:  awake, alert, oriented, in no acute distress  Gen: NAD, Language is Intact. Skin: no rash, lesion, dry  to touch. warm  Head: no rash, no icterus  Neck: There is no carotid bruits. The Neck is supple.  There is no neck lymphadenopathy. Neuro: CN 2-12 grossly intact with no focal deficits. Power 5/5 Throughout symmetric, Reflexes are  symmetric. Long tracts are intact. Cerebellar exam is Intact. Sensory exam is intact to light touch. Gait is intact. There is mild bradykinesia, there is no resting tremor noted. No hypophonia. Her blink rate is intact. Musculoskeletal:  Has no hand arthritis, no limitation of ROM in any of the four extremities. Lower extremities no edema  The abdomen is soft,  intact bowel sounds.          DATA:      Results for orders placed or performed during the hospital encounter of 06/22/21   Comprehensive Metabolic Panel   Result Value Ref Range    Glucose 114 (H) 74 - 106 mg/dl    CREATININE 1.0 0.6 - 1.3 mg/dl    BUN 16 7 - 18 mg/dl    Sodium 140 136 - 145 meq/l    Potassium 4.0 3.5 - 5.1 meq/l    Chloride 103 98 - 107 meq/l    CO2 29 21 - 32 meq/l    POC CALCIUM 9.1 8.5 - 10.1 mg/dl    AST 22 15 - 37 U/L    Alkaline Phosphatase 73 46 - 116 U/L    Total Protein 7.2 6.4 - 8.2 gm/dl    Albumin 3.6 3.4 - 5.0 gm/dl    Total Bilirubin 0.7 0.2 - 1.0 mg/dl    ALT 29 14 - 63 U/L   Magnesium   Result Value Ref Range    Magnesium 2.1 1.8 - 2.4 mg/dl   CBC Auto Differential   Result Value Ref Range    WBC 6.2 4.8 - 10.8 thou/mm3    RBC 4.61 4.20 - 5.40 mill/mm3    Hemoglobin 13.9 12.0 - 16.0 gm/dl    Hematocrit 42.3 37.0 - 47.0 %    MCV 91.9 81.0 - 99.0 fL    MCH 30.2 27.0 - 31.0 pg    MCHC 32.9 (L) 33.0 - 37.0 gm/dl    RDW 14.2 11.5 - 14.5 %    Platelets 149 057 - 233 thou/mm3    MPV 7.4 7.4 - 10.4 fL    SEGS 77.7 (H) 43.0 - 75.0 %    Lymphocytes 16.0 15.0 - 47.0 %    Monocytes 4.9 0.0 - 12.0 %    Eosinophils % 1.2 0.0 - 4.0 %    Basophils 0.2 0.0 - 3.0 %   Glomerular Filtration Rate, Estimated   Result Value Ref Range    GFR, Estimated 57 (A) ml/min/1.73m2   ANION GAP   Result Value Ref Range    Anion Gap 8.0 8.0 - 16.0 meq/l            Results for orders placed during the hospital encounter of 05/14/21    MRI BRAIN W WO CONTRAST    Narrative  PROCEDURE: MRI BRAIN W WO CONTRAST    CLINICAL INFORMATIONNew onset headache. New-onset severe episodes of headaches on the top of her head. Symptoms for 6 days. Doesn't normally get headaches. COMPARISON: Head CT 4/20/2016. TECHNIQUE: Multiplanar and multiple spin echo T1 and T2-weighted images were obtained through the brain before and after the administration of intravenous contrast.    FINDINGS:        The diffusion-weighted images are normal. The brain volume is mildly reduced. .There are no intra-or extra-axial collections. There is no hydrocephalus, midline shift or mass effect. On the FLAIR and T2-weighted sequences, there is a moderate amount of signal hyperintensity scattered in the deep and subcortical white matter of the brain. The pattern and distribution is most consistent with moderate severity chronic small vessel  ischemic changes. On the gradient echo T2-weighted images, there is no susceptibility artifact present. There is no abnormal enhancement in the brain. The major intracranial vascular flow voids are present. The midline craniocervical junction structures are normal.  The brainstem and pituitary gland are normal.    Impression  1. No acute findings. 2. Moderate severity chronic small vessel ischemic changes. 3. Mild global volume loss. **This report has been created using voice recognition software. It may contain minor errors which are inherent in voice recognition technology. **      Final report electronically signed by Dr. Osiris Carney on 5/14/2021 3:36 PM    No results found for this or any previous visit. Results for orders placed during the hospital encounter of 06/22/21    CT HEAD WO CONTRAST    Narrative  PROCEDURE: CT HEAD WO CONTRAST    CLINICAL INFORMATION: Dizziness. Dizziness which began yesterday. This has worsened today. Nausea. Off balance. Unable to walk. COMPARISON: Head CT 4/20/2016.  Brain MRI 5/14/2021. TECHNIQUE: Noncontrast 5 mm axial images were obtained through the brain. Sagittal and coronal reconstructions were obtained. All CT scans at this facility use dose modulation, iterative reconstruction, and/or weight-based dosing when appropriate to reduce radiation dose to as low as reasonably achievable. FINDINGS:    There is mild global volume loss. There is no hemorrhage. There are no intra-or extra-axial collections. There is no hydrocephalus, midline shift or mass effect. There is a moderate amount of patchy abnormal low attenuation in the white matter the brain consistent with chronic small  vessel ischemic changes. There is no blurring of the gray-white matter differentiation. There is no gross abnormality in the brainstem and posterior fossa. The paranasal sinuses and mastoid air cells are normally aerated. There is no suspicious calvarial abnormality. Impression  1. Stable CT appearance the brain. No CT evidence of an acute process. 2. Moderate severity chronic small vessel ischemic changes. 3. Given the patient's symptoms, a brain MRI is recommended. **This report has been created using voice recognition software. It may contain minor errors which are inherent in voice recognition technology. **    Final report electronically signed by Dr. Nayan Dunlap on 6/22/2021 8:47 AM         Assessment:     Diagnosis Orders   1. Parkinson disease ([facility] [address]  MRI BRAIN WO CONTRAST    Iron    Iron Binding Capacity    Ferritin   2. Restless leg syndrome  MRI BRAIN WO CONTRAST    Iron    Iron Binding Capacity    Ferritin   3. Dizziness  MRI BRAIN WO CONTRAST    Iron    Iron Binding Capacity    Ferritin   4. Abnormal finding of blood chemistry, unspecified   Iron        She feels her restless leg syndrome is worse. She is having more urge to move her legs worse at night. She tried the mirapex at higher dose, however she felt it made her nauseated.  Upon further questioning, she had new onset dizziness that began in June 2021. She described the dizziness as spinning sensation she can also feel nauseated and had balance trouble. She went to ER and had CT head done that was stable. 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 She did go to vestibular therapy which helped some, but she can still feel dizzy at times and nauseated, despite lowering her dose of mirapex. We will need to undergo MRI of the brain without contrast to evaluate for posterior circulation stroke. We will also order lab work checking iron studies. After detailed discussion with patient and her daughter we agreed on the following plan. Plan:  1. MRI brain WO contrast  2. Iron studies  3. Continue with current medications for now  4. Follow up in 3 weeks or sooner if needed. 5. Call if any questions or concerns. Time spent 31 min.     RISSA Dale - CNP

## 2021-08-12 NOTE — PATIENT INSTRUCTIONS
1. MRI brain WO contrast  2. Iron studies  3. Continue with current medications for now  4. Follow up in 3 weeks or sooner if needed. 5. Call if any questions or concerns.

## 2021-08-13 ENCOUNTER — HOSPITAL ENCOUNTER (OUTPATIENT)
Age: 81
Discharge: HOME | End: 2021-08-13
Payer: MEDICARE

## 2021-08-13 DIAGNOSIS — R42 DIZZINESS: ICD-10-CM

## 2021-08-13 DIAGNOSIS — G25.81 RESTLESS LEG SYNDROME: ICD-10-CM

## 2021-08-13 DIAGNOSIS — R79.9 ABNORMAL FINDING OF BLOOD CHEMISTRY, UNSPECIFIED: ICD-10-CM

## 2021-08-13 DIAGNOSIS — G20 PARKINSON DISEASE (HCC): ICD-10-CM

## 2021-08-13 LAB
FERRITIN: 231 NG/ML (ref 10–291)
IRON: 92 UG/DL (ref 50–170)
TOTAL IRON BINDING CAPACITY: 305 UG/DL (ref 171–450)

## 2021-08-13 PROCEDURE — 83540 ASSAY OF IRON: CPT

## 2021-08-13 PROCEDURE — 83550 IRON BINDING TEST: CPT

## 2021-08-13 PROCEDURE — 36415 COLL VENOUS BLD VENIPUNCTURE: CPT

## 2021-08-13 PROCEDURE — 82728 ASSAY OF FERRITIN: CPT

## 2021-08-20 DIAGNOSIS — G25.81 RESTLESS LEG SYNDROME: Primary | ICD-10-CM

## 2021-08-20 RX ORDER — PRAMIPEXOLE DIHYDROCHLORIDE 0.5 MG/1
0.5 TABLET ORAL 3 TIMES DAILY
Qty: 90 TABLET | Refills: 2 | Status: SHIPPED | OUTPATIENT
Start: 2021-08-20 | End: 2021-09-10 | Stop reason: SDUPTHER

## 2021-09-03 ENCOUNTER — HOSPITAL ENCOUNTER (OUTPATIENT)
Dept: MRI IMAGING | Age: 81
Discharge: HOME | End: 2021-09-03
Payer: MEDICARE

## 2021-09-03 DIAGNOSIS — G20 PARKINSON DISEASE (HCC): ICD-10-CM

## 2021-09-03 DIAGNOSIS — G25.81 RESTLESS LEG SYNDROME: ICD-10-CM

## 2021-09-03 DIAGNOSIS — R42 DIZZINESS: ICD-10-CM

## 2021-09-03 PROCEDURE — 70551 MRI BRAIN STEM W/O DYE: CPT

## 2021-09-07 ENCOUNTER — TELEPHONE (OUTPATIENT)
Dept: NEUROLOGY | Age: 81
End: 2021-09-07

## 2021-09-07 NOTE — TELEPHONE ENCOUNTER
----- Message from RISSA West - CNP sent at 9/6/2021  6:50 PM EDT -----  Please let patient know her MRI brain showed No evidence of acute intracranial abnormality.   Juan Ward CNP

## 2021-09-10 ENCOUNTER — OFFICE VISIT (OUTPATIENT)
Dept: NEUROLOGY | Age: 81
End: 2021-09-10
Payer: MEDICARE

## 2021-09-10 VITALS
HEART RATE: 71 BPM | WEIGHT: 177 LBS | BODY MASS INDEX: 34.75 KG/M2 | DIASTOLIC BLOOD PRESSURE: 76 MMHG | HEIGHT: 60 IN | OXYGEN SATURATION: 96 % | SYSTOLIC BLOOD PRESSURE: 130 MMHG

## 2021-09-10 DIAGNOSIS — G20 PARKINSON DISEASE (HCC): Primary | ICD-10-CM

## 2021-09-10 DIAGNOSIS — G25.81 RESTLESS LEG SYNDROME: ICD-10-CM

## 2021-09-10 PROCEDURE — 99213 OFFICE O/P EST LOW 20 MIN: CPT | Performed by: PSYCHIATRY & NEUROLOGY

## 2021-09-10 RX ORDER — PRAMIPEXOLE DIHYDROCHLORIDE 0.5 MG/1
0.5 TABLET ORAL 3 TIMES DAILY
Qty: 270 TABLET | Refills: 3 | Status: SHIPPED | OUTPATIENT
Start: 2021-09-10 | End: 2022-09-06

## 2021-09-10 RX ORDER — GABAPENTIN 300 MG/1
300 CAPSULE ORAL NIGHTLY
Qty: 30 CAPSULE | Refills: 5 | Status: SHIPPED | OUTPATIENT
Start: 2021-09-10 | End: 2022-01-17 | Stop reason: SDUPTHER

## 2021-09-10 NOTE — PATIENT INSTRUCTIONS
1. Start Neurontin 300 mg nightly. 2. Continue with Mirapex 0.5 mg three times a day. 3. Update us on progress in two weeks. 4. Continue with current medications for now  5. Follow up in 8 weeks or sooner if needed. 6. Call if any questions or concerns.

## 2021-09-10 NOTE — PROGRESS NOTES
NEUROLOGY OUT PATIENT FOLLOW UP NOTE:  9/10/36431:33 PM    Ariana Horton is here for follow up for parkinson's disease, restless leg syndrome. She had testing performed and is here to go over plan. Allergies   Allergen Reactions    Penicillins      Unsure of reaction, childhood    Lipitor [Atorvastatin] Other (See Comments)     Muscle aches       Current Outpatient Medications:     CALCIUM PO, Take 1,300 mg by mouth daily, Disp: , Rfl:     pramipexole (MIRAPEX) 0.5 MG tablet, Take 1 tablet by mouth 3 times daily, Disp: 90 tablet, Rfl: 2    rosuvastatin (CRESTOR) 10 MG tablet, TAKE 1 TABLET NIGHTLY, Disp: 90 tablet, Rfl: 3    levothyroxine (SYNTHROID) 50 MCG tablet, TAKE 1 TABLET DAILY FOR LOW THYROID, Disp: 90 tablet, Rfl: 3    Cholecalciferol (VITAMIN D3) 125 MCG (5000 UT) TABS, Take by mouth, Disp: , Rfl:     NONFORMULARY, Respire Pink Micro Sleep Appliance, Disp: , Rfl:     ACETAMINOPHEN PO, Take 1,000 mg by mouth daily as needed (pain) , Disp: , Rfl:     ondansetron (ZOFRAN ODT) 4 MG disintegrating tablet, Take 1 tablet by mouth every 8 hours as needed for Nausea or Vomiting (Patient not taking: Reported on 8/12/2021), Disp: 15 tablet, Rfl: 0    Fexofenadine HCl (ALLEGRA ALLERGY PO), Take by mouth (Patient not taking: Reported on 8/12/2021), Disp: , Rfl:     I reviewed the past medical history, allergies, medications, social history and family history. PE:   Vitals:    09/10/21 1423   BP: 130/76   Site: Left Upper Arm   Position: Sitting   Cuff Size: Medium Adult   Pulse: 71   SpO2: 96%   Weight: 177 lb (80.3 kg)   Height: 5' (1.524 m)     General Appearance:  awake, alert, oriented, in no acute distress, she is wearing mask. Gen: NAD, Language is Intact. Skin: no rash, lesion, dry  to touch warm  Head: no rash, no icterus  Neck: There is no carotid bruits. The Neck is supple. There is no neck lymphadenopathy. Neuro: CN 2-12 grossly intact with no focal deficits.  Power 5/5 Throughout symmetric, Reflexes are  symmetric. Long tracts are intact. Cerebellar exam is Intact. Sensory exam is intact to light touch. Gait is intact. There is mild bradykinesia, there is no resting tremor noted. No hypophonia. Her blink rate is intact. Musculoskeletal:  Has no hand arthritis, no limitation of ROM in any of the four extremities. Lower extremities no edema          DATA:      Results for orders placed or performed during the hospital encounter of 08/13/21   Ferritin   Result Value Ref Range    Ferritin 231 10 - 291 ng/mL   Iron Binding Capacity   Result Value Ref Range    TIBC 305 171 - 450 ug/dL   Iron   Result Value Ref Range    Iron 92 50 - 170 ug/dL              MRI BRAIN W WO CONTRAST    Narrative  PROCEDURE: MRI BRAIN W WO CONTRAST    CLINICAL INFORMATIONNew onset headache. New-onset severe episodes of headaches on the top of her head. Symptoms for 6 days. Doesn't normally get headaches. COMPARISON: Head CT 4/20/2016. TECHNIQUE: Multiplanar and multiple spin echo T1 and T2-weighted images were obtained through the brain before and after the administration of intravenous contrast.    FINDINGS:        The diffusion-weighted images are normal. The brain volume is mildly reduced. .There are no intra-or extra-axial collections. There is no hydrocephalus, midline shift or mass effect. On the FLAIR and T2-weighted sequences, there is a moderate amount of signal hyperintensity scattered in the deep and subcortical white matter of the brain. The pattern and distribution is most consistent with moderate severity chronic small vessel  ischemic changes. On the gradient echo T2-weighted images, there is no susceptibility artifact present. There is no abnormal enhancement in the brain. The major intracranial vascular flow voids are present. The midline craniocervical junction structures are normal.  The brainstem and pituitary gland are normal.    Impression  1. No acute findings.   2. Moderate severity chronic small vessel ischemic changes. 3. Mild global volume loss. **This report has been created using voice recognition software. It may contain minor errors which are inherent in voice recognition technology. **  Final report electronically signed by Dr. Vania Coleman on 5/14/2021 3:36 PM      CT HEAD WO CONTRAST    Narrative  PROCEDURE: CT HEAD WO CONTRAST  CLINICAL INFORMATION: Dizziness. Dizziness which began yesterday. This has worsened today. Nausea. Off balance. Unable to walk. COMPARISON: Head CT 4/20/2016. Brain MRI 5/14/2021. TECHNIQUE: Noncontrast 5 mm axial images were obtained through the brain. Sagittal and coronal reconstructions were obtained. All CT scans at this facility use dose modulation, iterative reconstruction, and/or weight-based dosing when appropriate to reduce radiation dose to as low as reasonably achievable. FINDINGS:  There is mild global volume loss. There is no hemorrhage. There are no intra-or extra-axial collections. There is no hydrocephalus, midline shift or mass effect. There is a moderate amount of patchy abnormal low attenuation in the white matter the brain consistent with chronic small  vessel ischemic changes. There is no blurring of the gray-white matter differentiation. There is no gross abnormality in the brainstem and posterior fossa. The paranasal sinuses and mastoid air cells are normally aerated. There is no suspicious calvarial abnormality. Impression  1. Stable CT appearance the brain. No CT evidence of an acute process. 2. Moderate severity chronic small vessel ischemic changes. 3. Given the patient's symptoms, a brain MRI is recommended. **This report has been created using voice recognition software. It may contain minor errors which are inherent in voice recognition technology. **  Final report electronically signed by Dr. Vania Coleman on 6/22/2021 8:47 AM     MRI Brain 9/3/2021:  I reviewed the MRI brain and agree with interpretation. Impression       1. No evidence of acute intracranial abnormality. 2. 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                   **This report has been created using voice recognition software. It may contain minor errors which are inherent in voice recognition technology. **       Final report electronically signed by Dr. Gil Polanco MD on 9/3/2021 3:49 PM         Assessment:     Diagnosis Orders   1. Parkinson disease ([facility] [address]     2. Restless leg syndrome          She feels her Parkinsons symptoms are stable, however she feels her restless leg syndrome is worse. I reviewed the MRI brain and agree with interpretation. MRI brain showed No evidence of acute intracranial abnormality. her iron studies were reviewed. The patient was counseled about her condition and medication options, timing. She feels the Mirapex helps, and feels that her symptoms are worse at night time only with numbness also appreciated in addition to the cramps. Hence I suggested adding the Neurontin at night time only in addition to the Mirapex. She is asked to update us in two weeks on how she is doing, so that we can adjust her medication or send for long term mail in pharmacy. After detailed discussion with patient we agreed on the following plan. Plan:  1. Start Neurontin 300 mg nightly. 2. Continue with Mirapex 0.5 mg three times a day. 3. Update us on progress in two weeks. 4. Continue with current medications for now  5. Follow up in 8 weeks or sooner if needed. 6. Call if any questions or concerns. Time spent 25 min.     Crystal Donnelly MD

## 2021-09-28 ASSESSMENT — LIFESTYLE VARIABLES
HOW OFTEN DO YOU HAVE A DRINK CONTAINING ALCOHOL: NEVER
AUDIT-C TOTAL SCORE: INCOMPLETE
AUDIT TOTAL SCORE: INCOMPLETE
HOW OFTEN DO YOU HAVE A DRINK CONTAINING ALCOHOL: 0

## 2021-09-28 ASSESSMENT — PATIENT HEALTH QUESTIONNAIRE - PHQ9
SUM OF ALL RESPONSES TO PHQ QUESTIONS 1-9: 0
2. FEELING DOWN, DEPRESSED OR HOPELESS: 0
1. LITTLE INTEREST OR PLEASURE IN DOING THINGS: 0
SUM OF ALL RESPONSES TO PHQ QUESTIONS 1-9: 0
SUM OF ALL RESPONSES TO PHQ QUESTIONS 1-9: 0
SUM OF ALL RESPONSES TO PHQ9 QUESTIONS 1 & 2: 0

## 2021-10-04 ENCOUNTER — OFFICE VISIT (OUTPATIENT)
Dept: FAMILY MEDICINE CLINIC | Age: 81
End: 2021-10-04
Payer: MEDICARE

## 2021-10-04 VITALS
WEIGHT: 179.6 LBS | BODY MASS INDEX: 35.26 KG/M2 | RESPIRATION RATE: 16 BRPM | OXYGEN SATURATION: 97 % | HEIGHT: 60 IN | DIASTOLIC BLOOD PRESSURE: 62 MMHG | TEMPERATURE: 97.1 F | SYSTOLIC BLOOD PRESSURE: 130 MMHG | HEART RATE: 63 BPM

## 2021-10-04 DIAGNOSIS — Z23 FLU VACCINE NEED: ICD-10-CM

## 2021-10-04 DIAGNOSIS — Z00.00 ROUTINE GENERAL MEDICAL EXAMINATION AT A HEALTH CARE FACILITY: Primary | ICD-10-CM

## 2021-10-04 DIAGNOSIS — L60.0 INGROWN TOENAIL OF RIGHT FOOT: ICD-10-CM

## 2021-10-04 PROCEDURE — G0008 ADMIN INFLUENZA VIRUS VAC: HCPCS | Performed by: NURSE PRACTITIONER

## 2021-10-04 PROCEDURE — G0438 PPPS, INITIAL VISIT: HCPCS | Performed by: NURSE PRACTITIONER

## 2021-10-04 PROCEDURE — 90694 VACC AIIV4 NO PRSRV 0.5ML IM: CPT | Performed by: NURSE PRACTITIONER

## 2021-10-04 NOTE — PATIENT INSTRUCTIONS
Personalized Preventive Plan for Elijah Blair - 10/4/2021  Medicare offers a range of preventive health benefits. Some of the tests and screenings are paid in full while other may be subject to a deductible, co-insurance, and/or copay. Some of these benefits include a comprehensive review of your medical history including lifestyle, illnesses that may run in your family, and various assessments and screenings as appropriate. After reviewing your medical record and screening and assessments performed today your provider may have ordered immunizations, labs, imaging, and/or referrals for you. A list of these orders (if applicable) as well as your Preventive Care list are included within your After Visit Summary for your review. Other Preventive Recommendations:    · A preventive eye exam performed by an eye specialist is recommended every 1-2 years to screen for glaucoma; cataracts, macular degeneration, and other eye disorders. · A preventive dental visit is recommended every 6 months. · Try to get at least 150 minutes of exercise per week or 10,000 steps per day on a pedometer . · Order or download the FREE \"Exercise & Physical Activity: Your Everyday Guide\" from [employer]. Call 5-949.385.3958 or search [employer] online. · You need 7031-3416 mg of calcium and 1948-7008 IU of vitamin D per day. It is possible to meet your calcium requirement with diet alone, but a vitamin D supplement is usually necessary to meet this goal.  · When exposed to the sun, use a sunscreen that protects against both UVA and UVB radiation with an SPF of 30 or greater. Reapply every 2 to 3 hours or after sweating, drying off with a towel, or swimming. · Always wear a seat belt when traveling in a car. Always wear a helmet when riding a bicycle or motorcycle.

## 2021-10-04 NOTE — PROGRESS NOTES
Medicare Annual Wellness Visit  Name: Navarro Human Date: 10/4/2021   MRN: [id number] Sex: Female   Age: 80 y.o. Ethnicity: Non- / Non    : 1940 Race: White (non-)      Niraj Harris is here for Medicare AWV (look at toenail) and Immunotherapy (flu shot)    Screenings for behavioral, psychosocial and functional/safety risks, and cognitive dysfunction are all negative except as indicated below. These results, as well as other patient data from the [address] form, are documented in Flowsheets linked to this Encounter. Allergies   Allergen Reactions    Penicillins      Unsure of reaction, childhood    Lipitor [Atorvastatin] Other (See Comments)     Muscle aches         Prior to Visit Medications    Medication Sig Taking? Authorizing Provider   Omega-3 300 MG CAPS Take 1 capsule by mouth 4 times daily Yes Historical Provider, MD   CALCIUM PO Take 1,300 mg by mouth daily Yes Historical Provider, MD   gabapentin (NEURONTIN) 300 MG capsule Take 1 capsule by mouth nightly for 90 days.  Yes Shane Sheldon MD   pramipexole (MIRAPEX) 0.5 MG tablet Take 1 tablet by mouth 3 times daily Yes Shane Sheldon MD   rosuvastatin (CRESTOR) 10 MG tablet TAKE 1 TABLET NIGHTLY Yes José Luis Feliz MD   levothyroxine (SYNTHROID) 50 MCG tablet TAKE 1 TABLET DAILY FOR LOW THYROID Yes José Luis Feliz MD   Cholecalciferol (VITAMIN D3) 125 MCG (5000 UT) TABS Take by mouth Yes Historical Provider, MD   Fexofenadine HCl (ALLEGRA ALLERGY PO) Take by mouth  Yes Historical Provider, MD   NONFORMULARY Respire Pink Micro Sleep Appliance Yes Historical Provider, MD   ACETAMINOPHEN PO Take 1,000 mg by mouth daily as needed (pain)  Yes Historical Provider, MD   ondansetron (ZOFRAN ODT) 4 MG disintegrating tablet Take 1 tablet by mouth every 8 hours as needed for Nausea or Vomiting  Patient not taking: Reported on 2021  Leticia Parrish MD   pravastatin (PRAVACHOL) 40 MG tablet Take 1 tablet by mouth every evening  Zachariah Tadeo MD         Past Medical History:   Diagnosis Date    Diverticulosis     Fatty liver     GERD (gastroesophageal reflux disease)     was on prilosec in past    Hiatal hernia     Hx of blood clots     Hyperlipidemia     Hypertension     Hypothyroidism     Incontinence     urinary    Osteoarthritis     Parkinson's disease ([address]     PONV (postoperative nausea and vomiting)     Prolonged emergence from general anesthesia     Stress fracture     left distal radius    Tubular adenoma     colononoscopy 2010- Dr. Chasity Cobian; large sigmoid polyp colonoscopy 2016    Vertigo        Past Surgical History:   Procedure Laterality Date    BLADDER SUSPENSION      CATARACT REMOVAL  05 23 2015   [address]      COLONOSCOPY  2010, june 2016    Dr. Chasity Cobian- polyps removed, Dr. Daniel Fox  03/2020    Dr. Mohsen Schneider Right 08/01/2016    plate and screw in lower right leg     HEMICOLECTOMY Right 6/4/2020    ROBOTIC RIGHT COLECTOMY performed by Trice Carbajal MD [facility] Right 08/23/2016    Plate inserted into rt. leg    POLYPECTOMY      [facility] Dr. Lilia Corley  03/2020    Dr. Noah Lucio release         Family History   Problem Relation Age of Onset    Parkinsonism Sister     Cancer Father     Colon Cancer Brother     Lung Cancer Brother     Heart Disease Paternal Grandmother         >58 yo       CareTeam (Including outside providers/suppliers regularly involved in providing care):   Patient Care Team:  RISSA Gonzáles CNP as PCP - General (Nurse Practitioner)  RISSA Gonzáles CNP as PCP - [facility] Empaneled Provider    Wt Readings from Last 3 Encounters:   10/04/21 179 lb 9.6 oz (81.5 kg)   09/10/21 177 lb (80.3 kg)   08/12/21 175 lb (79.4 kg)     Vitals:    10/04/21 0959   BP: 130/62   Site: Left Upper Arm   Position: Sitting   Cuff Size: Large Adult   Pulse: 63   Resp: 16   Temp: 97.1 °F (36.2 °C)   TempSrc: Temporal   SpO2: 97%   Weight: 179 lb 9.6 oz (81.5 kg)   Height: 5' (1.524 m)     Body mass index is 35.08 kg/m². Based upon direct observation of the patient, evaluation of cognition reveals recent and remote memory intact. General Appearance: alert and oriented to person, place and time, well developed and well- nourished, in no acute distress  Skin: ingrown toenail on right  Head: normocephalic and atraumatic  Eyes: pupils equal, round, and reactive to light, extraocular eye movements intact, conjunctivae normal  ENT: left ear irrigated  Neck: supple and non-tender without mass, no thyromegaly or thyroid nodules, no cervical lymphadenopathy  Pulmonary/Chest: clear to auscultation bilaterally- no wheezes, rales or rhonchi, normal air movement, no respiratory distress  Cardiovascular: normal rate, regular rhythm, normal S1 and S2, no murmurs, rubs, clicks, or gallops, distal pulses intact, no carotid bruits  Abdomen: soft, non-tender, non-distended, normal bowel sounds, no masses or organomegaly  Extremities: no cyanosis, clubbing or edema  Musculoskeletal: normal range of motion, no joint swelling, deformity or tenderness  Neurologic:tremor. Particular on right side  Patient's complete Health Risk Assessment and screening values have been reviewed and are found in Flowsheets. The following problems were reviewed today and where indicated follow up appointments were made and/or referrals ordered.     Positive Risk Factor Screenings with Interventions:           Health Habits/Nutrition:  Health Habits/Nutrition  Do you exercise for at least 20 minutes 2-3 times per week?: (!) No  Have you lost any weight without trying in the past 3 months?: No  Do you eat only one meal per day?: No  Have you seen the dentist within the past year?: Yes  Body mass index: (!) 35.07  Health Habits/Nutrition Interventions:  · Inadequate physical activity:  patient is not ready to increase his/her physical activity level at this time     Safety:  Safety  Do you have working smoke detectors?: (!) No  Have all throw rugs been removed or fastened?: Yes  Do you have non-slip mats or surfaces in all bathtubs/showers?: Yes  Do all of your stairways have a railing or banister?: Yes  Are your doorways, halls and stairs free of clutter?: Yes  Do you always fasten your seatbelt when you are in a car?: Yes  Safety Interventions:  · Home safety tips provided     Personalized Preventive Plan   Current Health Maintenance Status  Immunization History   Administered Date(s) Administered    COVID-19, Moderna, PF, 100mcg/0.5mL 01/28/2021, 02/25/2021    Hepatitis B (Recombivax HB) 07/08/2016, 08/18/2016, 04/07/2017    Influenza Virus Vaccine 12/02/2014, 12/14/2015    Influenza, High Dose (Fluzone 65 yrs and older) 11/20/2017, 10/19/2018    Influenza, Quadv, IM, PF (6 mo and older Fluzone, Flulaval, Fluarix, and 3 yrs and older Afluria) 10/17/2016    Influenza, Quadv, adjuvanted, 65 yrs +, IM, PF (Fluad) 10/15/2020, 10/04/2021    Influenza, Triv, inactivated, subunit, adjuvanted, IM (Fluad 65 yrs and older) 10/07/2019    Pneumococcal Conjugate 13-valent ([id number]) 07/12/2016    Pneumococcal Polysaccharide ([id number]) 08/18/2014    Tdap (Boostrix, Adacel) 10/19/2018    Zoster Live (Zostavax) 06/02/2016        Health Maintenance   Topic Date Due    Shingles Vaccine (2 of 3) 07/28/2016    Annual Wellness Visit (AWV)  04/15/2021    Lipid screen  12/14/2021    TSH testing  05/14/2022    Colon cancer screen colonoscopy  03/02/2023    DTaP/Tdap/Td vaccine (2 - Td or Tdap) 10/19/2028    DEXA (modify frequency per FRAX score)  Completed    Flu vaccine  Completed    Pneumococcal 65+ years Vaccine  Completed    COVID-19 Vaccine  Completed    Hepatitis A vaccine  Aged Out    Hepatitis B vaccine Aged Out    Hib vaccine  Aged Out    Meningococcal (ACWY) vaccine  Aged Out     Recommendations for [employer] Due: see orders and patient instructions/AVS.  . Recommended screening schedule for the next 5-10 years is provided to the patient in written form: see Patient Instructions/AVS.    Brian Lara was seen today for medicare awv and immunotherapy.     Diagnoses and all orders for this visit:    Routine general medical examination at a health care facility    Flu vaccine need  -     INFLUENZA, QUADV, ADJUVANTED, 65 YRS =, IM, PF, PREFILL SYR, 0.5ML (FLUAD)    Ingrown toenail of right foot  -     Amb External Referral To Podiatry             Pt referred to podiatry for toenail removal.  Ear was irrigated  Reviewed parkinson care

## 2021-11-04 ENCOUNTER — PATIENT MESSAGE (OUTPATIENT)
Dept: FAMILY MEDICINE CLINIC | Age: 81
End: 2021-11-04

## 2021-11-04 RX ORDER — NYSTATIN 100000 U/G
CREAM TOPICAL
Qty: 30 G | Refills: 1 | Status: SHIPPED | OUTPATIENT
Start: 2021-11-04 | End: 2021-12-06 | Stop reason: SDUPTHER

## 2021-11-04 NOTE — TELEPHONE ENCOUNTER
From: Bri De La Garza  To: RISSA KANG CNP  Sent: 11/4/2021 12:51 PM EDT  Subject: Non-Urgent Medical Question    I have been treating a skin condition for about 10 days and it is not getting better. Rash with open spots that bin when applying ointment. Location of rash is underneath right breast and upper legs in the creases. Been using antibiotic + pain relief cream. Sometimes I think it is improving but starts up again. If you feel I need appointment please call [phone].

## 2021-11-08 ENCOUNTER — OFFICE VISIT (OUTPATIENT)
Dept: NEUROLOGY | Age: 81
End: 2021-11-08
Payer: MEDICARE

## 2021-11-08 VITALS
DIASTOLIC BLOOD PRESSURE: 76 MMHG | HEART RATE: 69 BPM | WEIGHT: 180 LBS | OXYGEN SATURATION: 97 % | BODY MASS INDEX: 35.34 KG/M2 | SYSTOLIC BLOOD PRESSURE: 140 MMHG | HEIGHT: 60 IN

## 2021-11-08 DIAGNOSIS — G20 PARKINSON DISEASE (HCC): Primary | ICD-10-CM

## 2021-11-08 DIAGNOSIS — G25.81 RESTLESS LEG SYNDROME: ICD-10-CM

## 2021-11-08 PROCEDURE — 99213 OFFICE O/P EST LOW 20 MIN: CPT | Performed by: PSYCHIATRY & NEUROLOGY

## 2021-11-08 RX ORDER — ROTIGOTINE 1 MG/24H
1 PATCH, EXTENDED RELEASE TRANSDERMAL DAILY
Qty: 30 PATCH | Refills: 1 | Status: SHIPPED | OUTPATIENT
Start: 2021-11-08 | End: 2022-03-02 | Stop reason: SDUPTHER

## 2021-11-08 NOTE — PATIENT INSTRUCTIONS
1. Continue Neurontin 300 mg nightly. 2. Start Neupro 1 mg patch daily. 3. Continue with Mirapex 0.5 mg three times a day. 4. Update us on progress in two weeks. 5. Continue with current medications for now  6. Follow up in 8 weeks or sooner if needed. 7. Call if any questions or concerns.

## 2021-11-08 NOTE — PROGRESS NOTES
NEUROLOGY OUT PATIENT FOLLOW UP NOTE:  11/8/20211:15 PM    Mami Hong is here for follow up for parkinson's disease, restless leg syndrome. She had testing performed and is here to go over plan. Allergies   Allergen Reactions    Penicillins      Unsure of reaction, childhood    Lipitor [Atorvastatin] Other (See Comments)     Muscle aches       Current Outpatient Medications:     nystatin (MYCOSTATIN) 752698 UNIT/GM cream, Apply topically 2 times daily. , Disp: 30 g, Rfl: 1    Omega-3 300 MG CAPS, Take 1 capsule by mouth 4 times daily, Disp: , Rfl:     CALCIUM PO, Take 1,300 mg by mouth daily, Disp: , Rfl:     gabapentin (NEURONTIN) 300 MG capsule, Take 1 capsule by mouth nightly for 90 days. , Disp: 30 capsule, Rfl: 5    pramipexole (MIRAPEX) 0.5 MG tablet, Take 1 tablet by mouth 3 times daily, Disp: 270 tablet, Rfl: 3    rosuvastatin (CRESTOR) 10 MG tablet, TAKE 1 TABLET NIGHTLY, Disp: 90 tablet, Rfl: 3    levothyroxine (SYNTHROID) 50 MCG tablet, TAKE 1 TABLET DAILY FOR LOW THYROID, Disp: 90 tablet, Rfl: 3    Cholecalciferol (VITAMIN D3) 125 MCG (5000 UT) TABS, Take by mouth, Disp: , Rfl:     Fexofenadine HCl (ALLEGRA ALLERGY PO), Take by mouth as needed , Disp: , Rfl:     NONFORMULARY, Respire Pink Micro Sleep Appliance, Disp: , Rfl:     ACETAMINOPHEN PO, Take 1,000 mg by mouth daily as needed (pain) , Disp: , Rfl:     ondansetron (ZOFRAN ODT) 4 MG disintegrating tablet, Take 1 tablet by mouth every 8 hours as needed for Nausea or Vomiting (Patient not taking: Reported on 8/12/2021), Disp: 15 tablet, Rfl: 0    I reviewed the past medical history, allergies, medications, social history and family history. PE:   Vitals:    11/08/21 1249   BP: (!) 140/76   Site: Left Upper Arm   Position: Sitting   Cuff Size: Medium Adult   Pulse: 69   SpO2: 97%   Weight: 180 lb (81.6 kg)   Height: 5' (1.524 m)     General Appearance:  awake, alert, oriented, in no acute distress, she is wearing mask. Gen: NAD, Language is Intact. Skin: no rash, lesion, dry  to touch warm  Head: no rash, no icterus  Neck: There is no carotid bruits. The Neck is supple. There is no neck lymphadenopathy. Neuro: CN 2-12 grossly intact with no focal deficits. Power 5/5 Throughout symmetric, Reflexes are  symmetric. Long tracts are intact. Cerebellar exam is Intact. Sensory exam is intact to light touch. Gait is intact. There is mild bradykinesia, there is no resting tremor noted. No hypophonia. Her blink rate is intact. Musculoskeletal:  Has no hand arthritis, no limitation of ROM in any of the four extremities. Lower extremities no edema          DATA:      Results for orders placed or performed during the hospital encounter of 08/13/21   Ferritin   Result Value Ref Range    Ferritin 231 10 - 291 ng/mL   Iron Binding Capacity   Result Value Ref Range    TIBC 305 171 - 450 ug/dL   Iron   Result Value Ref Range    Iron 92 50 - 170 ug/dL              MRI BRAIN W WO CONTRAST    Narrative  PROCEDURE: MRI BRAIN W WO CONTRAST    CLINICAL INFORMATIONNew onset headache. New-onset severe episodes of headaches on the top of her head. Symptoms for 6 days. Doesn't normally get headaches. COMPARISON: Head CT 4/20/2016. TECHNIQUE: Multiplanar and multiple spin echo T1 and T2-weighted images were obtained through the brain before and after the administration of intravenous contrast.    FINDINGS:        The diffusion-weighted images are normal. The brain volume is mildly reduced. .There are no intra-or extra-axial collections. There is no hydrocephalus, midline shift or mass effect. On the FLAIR and T2-weighted sequences, there is a moderate amount of signal hyperintensity scattered in the deep and subcortical white matter of the brain. The pattern and distribution is most consistent with moderate severity chronic small vessel  ischemic changes.     On the gradient echo T2-weighted images, there is no susceptibility artifact present. There is no abnormal enhancement in the brain. The major intracranial vascular flow voids are present. The midline craniocervical junction structures are normal.  The brainstem and pituitary gland are normal.    Impression  1. No acute findings. 2. Moderate severity chronic small vessel ischemic changes. 3. Mild global volume loss. **This report has been created using voice recognition software. It may contain minor errors which are inherent in voice recognition technology. **  Final report electronically signed by Dr. Po Boykin on 5/14/2021 3:36 PM      CT HEAD WO CONTRAST    Narrative  PROCEDURE: CT HEAD WO CONTRAST  CLINICAL INFORMATION: Dizziness. Dizziness which began yesterday. This has worsened today. Nausea. Off balance. Unable to walk. COMPARISON: Head CT 4/20/2016. Brain MRI 5/14/2021. TECHNIQUE: Noncontrast 5 mm axial images were obtained through the brain. Sagittal and coronal reconstructions were obtained. All CT scans at this facility use dose modulation, iterative reconstruction, and/or weight-based dosing when appropriate to reduce radiation dose to as low as reasonably achievable. FINDINGS:  There is mild global volume loss. There is no hemorrhage. There are no intra-or extra-axial collections. There is no hydrocephalus, midline shift or mass effect. There is a moderate amount of patchy abnormal low attenuation in the white matter the brain consistent with chronic small  vessel ischemic changes. There is no blurring of the gray-white matter differentiation. There is no gross abnormality in the brainstem and posterior fossa. The paranasal sinuses and mastoid air cells are normally aerated. There is no suspicious calvarial abnormality. Impression  1. Stable CT appearance the brain. No CT evidence of an acute process. 2. Moderate severity chronic small vessel ischemic changes. 3. Given the patient's symptoms, a brain MRI is recommended.   **This report has been created using voice recognition software. It may contain minor errors which are inherent in voice recognition technology. **  Final report electronically signed by Dr. Poornima Tipton on 6/22/2021 8:47 AM     MRI Brain 9/3/2021:  I reviewed the MRI brain and agree with interpretation. Impression       1. No evidence of acute intracranial abnormality. 2. 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                   **This report has been created using voice recognition software. It may contain minor errors which are inherent in voice recognition technology. **       Final report electronically signed by Dr. Toro Collazo MD on 9/3/2021 3:49 PM         Assessment:     Diagnosis Orders   1. Parkinson disease ([facility] [address]     2. Restless leg syndrome           Follow up for PD, RLS. she reports benefit to the medication she is on. Her RLS symptoms subside at night, due to the Mirapex. She does not wish to change her medications due to concern about getting sleepy, no reported falls. She is bothered with her symptoms behind the wheel. No swallowing trouble, no hallucination. We discussed options of medications and side effects. After detailed discussion with patient we agreed on the following plan. Plan:  1. Continue Neurontin 300 mg nightly. 2. Start Neupro 1 mg patch daily. 3. Continue with Mirapex 0.5 mg three times a day. 4. Update us on progress in two weeks. 5. Continue with current medications for now  6. Follow up in 8 weeks or sooner if needed. 7. Call if any questions or concerns. Time spent 21 min.     Natalia Servin MD

## 2021-11-10 RX ORDER — DOXYCYCLINE HYCLATE 100 MG
100 TABLET ORAL 2 TIMES DAILY
Qty: 20 TABLET | Refills: 0 | Status: SHIPPED | OUTPATIENT
Start: 2021-11-10 | End: 2021-12-06 | Stop reason: SDUPTHER

## 2021-12-06 ENCOUNTER — PATIENT MESSAGE (OUTPATIENT)
Dept: FAMILY MEDICINE CLINIC | Age: 81
End: 2021-12-06

## 2021-12-06 RX ORDER — NYSTATIN 100000 U/G
CREAM TOPICAL
Qty: 30 G | Refills: 1 | Status: SHIPPED | OUTPATIENT
Start: 2021-12-06 | End: 2022-02-11 | Stop reason: SDUPTHER

## 2021-12-06 RX ORDER — DOXYCYCLINE HYCLATE 100 MG
100 TABLET ORAL 2 TIMES DAILY
Qty: 20 TABLET | Refills: 0 | Status: SHIPPED | OUTPATIENT
Start: 2021-12-06 | End: 2021-12-16

## 2021-12-06 NOTE — TELEPHONE ENCOUNTER
From: Zay Thomas  To: Wolf Ludwgi  Sent: 12/6/2021 10:12 AM EST  Subject: Yeast or other infection    you have treated me for infection by a prescription of cream for yeast infection and also a prescription of a tablet. Both have helped a great deal but I still have a little problem I think.  Didn't know if you would prescribe more tablets and more cream or you want to see me first.

## 2022-01-17 ENCOUNTER — VIRTUAL VISIT (OUTPATIENT)
Dept: NEUROLOGY | Age: 82
End: 2022-01-17
Payer: MEDICARE

## 2022-01-17 DIAGNOSIS — G25.81 RESTLESS LEG SYNDROME: ICD-10-CM

## 2022-01-17 DIAGNOSIS — G20 PARKINSON DISEASE (HCC): Primary | ICD-10-CM

## 2022-01-17 PROCEDURE — 99213 OFFICE O/P EST LOW 20 MIN: CPT | Performed by: NURSE PRACTITIONER

## 2022-01-17 RX ORDER — GABAPENTIN 300 MG/1
300 CAPSULE ORAL NIGHTLY
Qty: 90 CAPSULE | Refills: 1 | Status: SHIPPED | OUTPATIENT
Start: 2022-01-17 | End: 2022-07-25

## 2022-01-17 ASSESSMENT — ENCOUNTER SYMPTOMS
EYES NEGATIVE: 1
RESPIRATORY NEGATIVE: 1
GASTROINTESTINAL NEGATIVE: 1

## 2022-01-17 NOTE — PROGRESS NOTES
2022    TELEHEALTH EVALUATION -- Audio/Visual (During NKKUO-06 public health emergency)    HPI:    Zenobia Woods (:  1940) has requested an audio/video evaluation for the following concern(s): parkinson's disease, restless leg syndrome. Review of Systems   Eyes: Negative. Respiratory: Negative. Cardiovascular: Negative. Gastrointestinal: Negative. Prior to Visit Medications    Medication Sig Taking? Authorizing Provider   nystatin (MYCOSTATIN) 420137 UNIT/GM cream Apply topically 2 times daily. RISSA Hung CNP   rotigotine (NEUPRO) 1 MG/24HR PT24 Place 1 patch onto the skin daily  Chasity De La Fuente MD   Omega-3 300 MG CAPS Take 1 capsule by mouth 4 times daily  Historical Provider, MD   CALCIUM PO Take 1,300 mg by mouth daily  Historical Provider, MD   gabapentin (NEURONTIN) 300 MG capsule Take 1 capsule by mouth nightly for 90 days.   Chasity De La Fuente MD   pramipexole (MIRAPEX) 0.5 MG tablet Take 1 tablet by mouth 3 times daily  Chasity De La Fuente MD   ondansetron (ZOFRAN ODT) 4 MG disintegrating tablet Take 1 tablet by mouth every 8 hours as needed for Nausea or Vomiting  Patient not taking: Reported on 2021  Thi Aly MD   rosuvastatin (CRESTOR) 10 MG tablet TAKE 1 TABLET NIGHTLY  Heriberto Araiza MD   levothyroxine (SYNTHROID) 50 MCG tablet TAKE 1 TABLET DAILY FOR LOW THYROID  Heriberto Araiza MD   Cholecalciferol (VITAMIN D3) 125 MCG (5000 UT) TABS Take by mouth  Historical Provider, MD   Fexofenadine HCl (ALLEGRA ALLERGY PO) Take by mouth as needed   Historical Provider, MD   NONFORMULARY Respire Pink Micro Sleep Appliance  Historical Provider, MD   ACETAMINOPHEN PO Take 1,000 mg by mouth daily as needed (pain)   Historical Provider, MD   pravastatin (PRAVACHOL) 40 MG tablet Take 1 tablet by mouth every evening  Heriberto Araiza MD       Social History     Tobacco Use    Smoking status: Former Smoker     Packs/day: 0.25     Years: 1.00 Pack years: 0.25     Types: Cigarettes     Quit date: 1959     Years since quittin.5    Smokeless tobacco: Never Used   Vaping Use    Vaping Use: Never used   Substance Use Topics    Alcohol use: No     Alcohol/week: 0.0 standard drinks    Drug use: No        Past Medical History:   Diagnosis Date    Diverticulosis     Fatty liver     GERD (gastroesophageal reflux disease)     was on prilosec in past    Hiatal hernia     Hx of blood clots     Hyperlipidemia     Hypertension     Hypothyroidism     Incontinence     urinary    Osteoarthritis     Parkinson's disease ([address]     PONV (postoperative nausea and vomiting)     Prolonged emergence from general anesthesia     Stress fracture     left distal radius    Tubular adenoma     colononoscopy - Dr. Klaus Plummer; large sigmoid polyp colonoscopy     Vertigo    ,   Past Surgical History:   Procedure Laterality Date    BLADDER SUSPENSION      CATARACT REMOVAL  2015   Enedina Revels      COLONOSCOPY      Dr. Klaus Plummer- polyps removed, Dr. Villanueva [facility]  2020    Dr. Jose Miguel Sharp Right 2016    plate and screw in lower right leg     HEMICOLECTOMY Right 2020    ROBOTIC RIGHT COLECTOMY performed by Dominic Pinto MD at [place] [address] Right 2016    Plate inserted into rt. leg    [facility]CT[facility]Y      [facility] Dr. Hankins   2020    Dr. Diomedes plaza   ,   Social History     Tobacco Use    Smoking status: Former Smoker     Packs/day: 0.25     Years: 1.00     Pack years: 0.25     Types: Cigarettes     Quit date: 1959     Years since quittin.5    Smokeless tobacco: Never Used   Vaping Use    Vaping Use: Never used   Substance Use Topics    Alcohol use: No     Alcohol/week: 0.0 standard drinks    Drug use: No   ,   Family History   Problem Relation Age of Onset    Parkinsonism Sister     Cancer Father     Colon Cancer Brother     Lung Cancer Brother     Heart Disease Paternal Grandmother         >56 yo       PHYSICAL EXAMINATION:  [ INSTRUCTIONS:  \"[x]\" Indicates a positive item  \"[]\" Indicates a negative item  -- DELETE ALL ITEMS NOT EXAMINED]  Vital Signs: (As obtained by patient/caregiver or practitioner observation)    Blood pressure-  Heart rate-    Respiratory rate-    Temperature-  Pulse oximetry-     Constitutional: [x] Appears well-developed and well-nourished [x] No apparent distress      [] Abnormal-   Mental status  [x] Alert and awake  [x] Oriented to person/place/time [x]Able to follow commands      Eyes:  EOM    [x]  Normal  [] Abnormal-  Sclera  [x]  Normal  [] Abnormal -         Discharge [x]  None visible  [] Abnormal -    HENT:   [x] Normocephalic, atraumatic. [] Abnormal   [x] Mouth/Throat: Mucous membranes are moist.     External Ears [x] Normal  [] Abnormal-     Neck: [x] No visualized mass     Pulmonary/Chest: [x] Respiratory effort normal.  [x] No visualized signs of difficulty breathing or respiratory distress        [] Abnormal-      Musculoskeletal:   [x] Normal gait with no signs of ataxia         [x] Normal range of motion of neck        [] Abnormal-       Neurological:        [x] No Facial Asymmetry (Cranial nerve 7 motor function) (limited exam to video visit)          [x] No gaze palsy, her arm swing is intact, there is no shuffling gait. There is mild bradykinesia, no hypophonia. [] Abnormal-         Skin:        [x] No significant exanthematous lesions or discoloration noted on facial skin         [] Abnormal-            Psychiatric:       [x] Normal Affect [x] No Hallucinations        [] Abnormal-     Other pertinent observable physical exam findings-     ASSESSMENT/PLAN:  1. Parkinson disease ([facility]  2. Restless leg syndrome     Follow up for PD, RLS.  She feels her restless leg syndrome is well controlled at night. She denies any falls. No hallucination or dysphagia. She is currently on mirapex and Neurontin and is tolerating the medication well. She did not try the neupro patch due to cost. She is agreeable to try the neupro patch, we will give her samples today in office. After detailed discussion with patient and her granddaughter we agreed on the following plan. .       Plan:  1. Continue Neurontin 300 mg nightly. Refills given  2. Start Neupro 1 mg patch daily. Samples given today in office. 3. Continue with Mirapex 0.5 mg three times a day. 4. Update us on progress in two weeks. 5. Continue with current medications for now  6. Follow up in 8 weeks or sooner if needed. 7. Call if any questions or concerns    Zenobia Woods, was evaluated through a synchronous (real-time) audio-video encounter. The patient (or guardian if applicable) is aware that this is a billable service. Verbal consent to proceed has been obtained within the past 12 months. The visit was conducted pursuant to the emergency declaration under the [address] [place] [address], [address] [place] [address] authority and the [employer] Act. Patient identification was verified, and a caregiver was present when appropriate. The patient was located in a state where the provider was credentialed to provide care. Total time spent on this encounter:  22 minutes    --RISSA Stoll CNP on 1/17/2022 at 2:10 PM    An electronic signature was used to authenticate this note.

## 2022-01-17 NOTE — PATIENT INSTRUCTIONS
1. Continue Neurontin 300 mg nightly. Refills given  2. Start Neupro 1 mg patch daily. Samples given today in office. 3. Continue with Mirapex 0.5 mg three times a day. 4. Update us on progress in two weeks. 5. Continue with current medications for now  6. Follow up in 8 weeks or sooner if needed.    7. Call if any questions or concerns

## 2022-02-11 RX ORDER — NYSTATIN 100000 U/G
CREAM TOPICAL
Qty: 30 G | Refills: 1 | Status: SHIPPED | OUTPATIENT
Start: 2022-02-11 | End: 2022-09-22 | Stop reason: SDUPTHER

## 2022-03-02 DIAGNOSIS — G20 PARKINSON DISEASE (HCC): Primary | ICD-10-CM

## 2022-03-02 DIAGNOSIS — G25.81 RESTLESS LEG SYNDROME: ICD-10-CM

## 2022-03-02 RX ORDER — ROTIGOTINE 1 MG/24H
1 PATCH, EXTENDED RELEASE TRANSDERMAL DAILY
Qty: 90 PATCH | Refills: 1 | Status: SHIPPED | OUTPATIENT
Start: 2022-03-02 | End: 2022-03-14 | Stop reason: SDUPTHER

## 2022-03-14 ENCOUNTER — OFFICE VISIT (OUTPATIENT)
Dept: NEUROLOGY | Age: 82
End: 2022-03-14
Payer: MEDICARE

## 2022-03-14 VITALS
SYSTOLIC BLOOD PRESSURE: 142 MMHG | HEIGHT: 60 IN | HEART RATE: 72 BPM | BODY MASS INDEX: 34.36 KG/M2 | DIASTOLIC BLOOD PRESSURE: 72 MMHG | WEIGHT: 175 LBS

## 2022-03-14 DIAGNOSIS — G25.81 RESTLESS LEG SYNDROME: ICD-10-CM

## 2022-03-14 DIAGNOSIS — G20 PARKINSON DISEASE (HCC): Primary | ICD-10-CM

## 2022-03-14 PROCEDURE — 99213 OFFICE O/P EST LOW 20 MIN: CPT | Performed by: NURSE PRACTITIONER

## 2022-03-14 RX ORDER — ROTIGOTINE 1 MG/24H
1 PATCH, EXTENDED RELEASE TRANSDERMAL DAILY
Qty: 28 PATCH | Refills: 0 | COMMUNITY
Start: 2022-03-14 | End: 2022-07-15

## 2022-03-14 NOTE — PATIENT INSTRUCTIONS
1. Continue Neurontin 300 mg nightly. Refills given  2. Continue wtih Neupro 1 mg patch daily. Samples given today in office. 3. Continue with Mirapex 0.5 mg three times a day. 4. Continue with current medications for now  5. Follow up in 4 months or sooner if needed.    6. Call if any questions or concerns

## 2022-04-05 ENCOUNTER — OFFICE VISIT (OUTPATIENT)
Dept: FAMILY MEDICINE CLINIC | Age: 82
End: 2022-04-05
Payer: MEDICARE

## 2022-04-05 VITALS
HEART RATE: 65 BPM | RESPIRATION RATE: 14 BRPM | WEIGHT: 177 LBS | SYSTOLIC BLOOD PRESSURE: 122 MMHG | DIASTOLIC BLOOD PRESSURE: 70 MMHG | TEMPERATURE: 97.7 F | BODY MASS INDEX: 34.57 KG/M2

## 2022-04-05 DIAGNOSIS — E78.00 PURE HYPERCHOLESTEROLEMIA: ICD-10-CM

## 2022-04-05 DIAGNOSIS — R42 VERTIGO: ICD-10-CM

## 2022-04-05 DIAGNOSIS — G20 PARKINSON DISEASE (HCC): ICD-10-CM

## 2022-04-05 DIAGNOSIS — E03.9 ACQUIRED HYPOTHYROIDISM: Primary | ICD-10-CM

## 2022-04-05 DIAGNOSIS — J40 BRONCHITIS: ICD-10-CM

## 2022-04-05 PROBLEM — I82.441 ACUTE DVT OF RIGHT TIBIAL VEIN (HCC): Status: RESOLVED | Noted: 2021-03-18 | Resolved: 2022-04-05

## 2022-04-05 PROCEDURE — 99214 OFFICE O/P EST MOD 30 MIN: CPT | Performed by: NURSE PRACTITIONER

## 2022-04-05 RX ORDER — AZITHROMYCIN 250 MG/1
TABLET, FILM COATED ORAL
Qty: 1 PACKET | Refills: 0 | Status: SHIPPED | OUTPATIENT
Start: 2022-04-05 | End: 2022-05-11

## 2022-04-05 RX ORDER — BENZONATATE 200 MG/1
200 CAPSULE ORAL 3 TIMES DAILY PRN
Qty: 30 CAPSULE | Refills: 0 | Status: SHIPPED | OUTPATIENT
Start: 2022-04-05 | End: 2022-04-12

## 2022-04-05 ASSESSMENT — ENCOUNTER SYMPTOMS
GASTROINTESTINAL NEGATIVE: 1
COUGH: 1
EYES NEGATIVE: 1

## 2022-04-05 NOTE — PROGRESS NOTES
Dayami Booth is a 80 y.o. female whopresents today for :  Chief Complaint   Patient presents with    6 Month Follow-Up    Dizziness    Congestion    Cerumen Impaction       HPI:     HPI  Pt here for fu. Pt has chronic vertigo issues. Feels it more flared at this time as she believe ears are impacted with cerumen. She also just recently developed congestion and cough. Pt chest wall is sore from coughing. Parkinson is stable.        Patient Active Problem List   Diagnosis    Pure hypercholesterolemia    Vitamin D deficiency    Hypothyroidism    Osteopenia    Parkinson disease (HCC)    Restless leg syndrome    Personal history of DVT (deep vein thrombosis)    Acute diverticulitis    Left lower quadrant pain    Hepatic steatosis    Tubular adenoma    S/P partial resection of colon        Past Medical History:   Diagnosis Date    Diverticulosis     Fatty liver     GERD (gastroesophageal reflux disease)     was on prilosec in past    Hiatal hernia     Hx of blood clots     Hyperlipidemia     Hypertension     Hypothyroidism     Incontinence     urinary    Osteoarthritis     Parkinson's disease ([address]     PONV (postoperative nausea and vomiting)     Prolonged emergence from general anesthesia     Stress fracture     left distal radius    Tubular adenoma     colononoscopy 2010- Dr. Nell Coronel; large sigmoid polyp colonoscopy 2016    Vertigo       Past Surgical History:   Procedure Laterality Date    BLADDER SUSPENSION      [facility]  05 23 2015   [facility]      COLONOSCOPY  2010, june 2016    Dr. Nell Coronel- polyps removed, Dr. Lokesh Guerrier    COLONOSCOPY  03/2020    Dr. Baker Listen Right 08/01/2016    plate and screw in lower right leg     HEMICOLECTOMY Right 6/4/2020    ROBOTIC RIGHT COLECTOMY performed by Blake Verde MD at [facility] Right 08/23/2016    Plate inserted into rt. leg    POLYPECTOMY      [facility]- Dr. Baldev Gonzalez  2020    Dr. Mat Amos release     Family History   Problem Relation Age of Onset    Parkinsonism Sister     Cancer Father     Colon Cancer Brother     Lung Cancer Brother     Heart Disease Paternal [de-identified]         >56 yo     Social History     Tobacco Use    Smoking status: Former Smoker     Packs/day: 0.25     Years: 1.00     Pack years: 0.25     Types: Cigarettes     Quit date: 1959     Years since quittin.7    Smokeless tobacco: Never Used   Substance Use Topics    Alcohol use: No     Alcohol/week: 0.0 standard drinks      Current Outpatient Medications   Medication Sig Dispense Refill    azithromycin (ZITHROMAX) 250 MG tablet Take 2 tabs (500 mg) on Day 1, and take 1 tab (250 mg) on days 2 through 5. 1 packet 0    benzonatate (TESSALON) 200 MG capsule Take 1 capsule by mouth 3 times daily as needed for Cough 30 capsule 0    rotigotine (NEUPRO) 1 MG/24HR PT24 Place 1 patch onto the skin daily 28 patch 0    nystatin (MYCOSTATIN) 762379 UNIT/GM cream Apply topically 2 times daily. 30 g 1    levothyroxine (SYNTHROID) 50 MCG tablet TAKE 1 TABLET DAILY FOR LOW THYROID 90 tablet 0    rosuvastatin (CRESTOR) 10 MG tablet TAKE 1 TABLET NIGHTLY 90 tablet 0    gabapentin (NEURONTIN) 300 MG capsule Take 1 capsule by mouth nightly for 90 days.  90 capsule 1    Omega-3 300 MG CAPS Take 1 capsule by mouth 4 times daily      CALCIUM PO Take 1,300 mg by mouth daily      pramipexole (MIRAPEX) 0.5 MG tablet Take 1 tablet by mouth 3 times daily 270 tablet 3    Cholecalciferol (VITAMIN D3) 125 MCG (5000 UT) TABS Take by mouth      Fexofenadine HCl (ALLEGRA ALLERGY PO) Take by mouth as needed       NONFORMULARY Respire Pink Micro Sleep Appliance      ACETAMINOPHEN PO Take 1,000 mg by mouth daily as needed (pain)       ondansetron (ZOFRAN ODT) 4 MG disintegrating tablet Take 1 tablet by mouth every 8 hours as needed for Nausea or Vomiting (Patient not taking: Reported on 8/12/2021) 15 tablet 0     No current facility-administered medications for this visit. Allergies   Allergen Reactions    Penicillins      Unsure of reaction, childhood    Lipitor [Atorvastatin] Other (See Comments)     Muscle aches     Health Maintenance   Topic Date Due    Shingles Vaccine (2 of 3) 07/28/2016    COVID-19 Vaccine (3 - Booster for Moderna series) 07/25/2021    Lipid screen  12/14/2021    TSH testing  05/14/2022    Depression Screen  09/28/2022    Annual Wellness Visit (AWV)  10/05/2022    Colorectal Cancer Screen  03/02/2023    DTaP/Tdap/Td vaccine (2 - Td or Tdap) 10/19/2028    DEXA (modify frequency per FRAX score)  Completed    Flu vaccine  Completed    Pneumococcal 65+ years Vaccine  Completed    Hepatitis A vaccine  Aged Out    Hepatitis B vaccine  Aged Out    Hib vaccine  Aged Out    Meningococcal (ACWY) vaccine  Aged Out       Subjective:     Review of Systems   Constitutional: Negative. HENT: Negative. Eyes: Negative. Respiratory: Positive for cough. Cardiovascular: Negative. Gastrointestinal: Negative. Musculoskeletal: Negative. Skin: Negative. Neurological: Positive for dizziness. Objective:     Vitals:    04/05/22 1010   BP: 122/70   Site: Left Upper Arm   Position: Sitting   Cuff Size: Large Adult   Pulse: 65   Resp: 14   Temp: 97.7 °F (36.5 °C)   TempSrc: Temporal   Weight: 177 lb (80.3 kg)       Physical Exam  Constitutional:       Appearance: She is well-developed. HENT:      Head: Normocephalic. Right Ear: Tympanic membrane and external ear normal.      Left Ear: Tympanic membrane and external ear normal.      Nose: Nose normal.   Cardiovascular:      Rate and Rhythm: Normal rate and regular rhythm. Heart sounds: Normal heart sounds. No murmur heard. No friction rub. No gallop.     Pulmonary:      Effort: Pulmonary effort is normal. Breath sounds: Normal breath sounds. No wheezing or rales. Abdominal:      General: Bowel sounds are normal.      Palpations: Abdomen is soft. Tenderness: There is no abdominal tenderness. There is no guarding. Musculoskeletal:         General: Normal range of motion. Cervical back: Normal range of motion and neck supple. Lymphadenopathy:      Cervical: No cervical adenopathy. Skin:     General: Skin is warm. Neurological:      Mental Status: She is alert and oriented to person, place, and time. Deep Tendon Reflexes: Reflexes are normal and symmetric. Assessment:      Diagnosis Orders   1. Acquired hypothyroidism  TSH With Reflex Ft4   2. Pure hypercholesterolemia  CBC with Auto Differential    Comprehensive Metabolic Panel    Lipid Panel   3. Parkinson disease ([facility] [address]     4. Vertigo  External Referral To Physical Therapy   5. Bronchitis  azithromycin (ZITHROMAX) 250 MG tablet    benzonatate (TESSALON) 200 MG capsule       Plan:      Return in about 6 months (around 10/5/2022). Orders Placed This Encounter   Procedures    CBC with Auto Differential     Standing Status:   Future     Standing Expiration Date:   4/5/2023    Comprehensive Metabolic Panel     Standing Status:   Future     Standing Expiration Date:   4/5/2023    Lipid Panel     Standing Status:   Future     Standing Expiration Date:   4/5/2023     Order Specific Question:   Is Patient Fasting?/# of Hours     Answer:   12    TSH With Reflex Ft4     Standing Status:   Future     Standing Expiration Date:   4/5/2023    External Referral To Physical Therapy     Referral Priority:   Routine     Referral Type:   Eval and Treat     Requested Specialty:   Physical Therapy     Number of Visits Requested:   1     Orders Placed This Encounter   Medications    azithromycin (ZITHROMAX) 250 MG tablet     Sig: Take 2 tabs (500 mg) on Day 1, and take 1 tab (250 mg) on days 2 through 5.      Dispense:  1 packet     Refill: 0    benzonatate (TESSALON) 200 MG capsule     Sig: Take 1 capsule by mouth 3 times daily as needed for Cough     Dispense:  30 capsule     Refill:  0    labs ordered  With her vertigo rx for PT however I suspect may be central cause due to parkinson. Orders for her cough        Patient given educational materials - seepatient instructions. Discussed use, benefit, and side effects of prescribed medications. All patient questions answered. Pt voiced understanding. Patient agreed withtreatment plan. Follow up as directed.      Electronically signed by RISSA Stovall CNP on 4/5/2022 at 5:17 PM

## 2022-04-06 ENCOUNTER — NURSE ONLY (OUTPATIENT)
Dept: LAB | Age: 82
End: 2022-04-06

## 2022-04-06 DIAGNOSIS — E78.00 PURE HYPERCHOLESTEROLEMIA: ICD-10-CM

## 2022-04-06 DIAGNOSIS — E03.9 ACQUIRED HYPOTHYROIDISM: ICD-10-CM

## 2022-04-06 LAB
ALBUMIN SERPL-MCNC: 4.1 G/DL (ref 3.5–5.1)
ALP BLD-CCNC: 70 U/L (ref 38–126)
ALT SERPL-CCNC: 28 U/L (ref 11–66)
ANION GAP SERPL CALCULATED.3IONS-SCNC: 12 MEQ/L (ref 8–16)
AST SERPL-CCNC: 30 U/L (ref 5–40)
BASOPHILS # BLD: 0.9 %
BASOPHILS ABSOLUTE: 0 THOU/MM3 (ref 0–0.1)
BILIRUB SERPL-MCNC: 0.5 MG/DL (ref 0.3–1.2)
BUN BLDV-MCNC: 15 MG/DL (ref 7–22)
CALCIUM SERPL-MCNC: 10 MG/DL (ref 8.5–10.5)
CHLORIDE BLD-SCNC: 102 MEQ/L (ref 98–111)
CHOLESTEROL, TOTAL: 161 MG/DL (ref 100–199)
CO2: 27 MEQ/L (ref 23–33)
CREAT SERPL-MCNC: 0.7 MG/DL (ref 0.4–1.2)
EOSINOPHIL # BLD: 2.2 %
EOSINOPHILS ABSOLUTE: 0.1 THOU/MM3 (ref 0–0.4)
ERYTHROCYTE [DISTWIDTH] IN BLOOD BY AUTOMATED COUNT: 13.2 % (ref 11.5–14.5)
ERYTHROCYTE [DISTWIDTH] IN BLOOD BY AUTOMATED COUNT: 45.8 FL (ref 35–45)
GFR SERPL CREATININE-BSD FRML MDRD: 80 ML/MIN/1.73M2
GLUCOSE BLD-MCNC: 96 MG/DL (ref 70–108)
HCT VFR BLD CALC: 42.4 % (ref 37–47)
HDLC SERPL-MCNC: 54 MG/DL
HEMOGLOBIN: 13.7 GM/DL (ref 12–16)
IMMATURE GRANS (ABS): 0 THOU/MM3 (ref 0–0.07)
IMMATURE GRANULOCYTES: 0 %
LDL CHOLESTEROL CALCULATED: 76 MG/DL
LYMPHOCYTES # BLD: 45.8 %
LYMPHOCYTES ABSOLUTE: 2.5 THOU/MM3 (ref 1–4.8)
MCH RBC QN AUTO: 30.6 PG (ref 26–33)
MCHC RBC AUTO-ENTMCNC: 32.3 GM/DL (ref 32.2–35.5)
MCV RBC AUTO: 94.9 FL (ref 81–99)
MONOCYTES # BLD: 7.9 %
MONOCYTES ABSOLUTE: 0.4 THOU/MM3 (ref 0.4–1.3)
NUCLEATED RED BLOOD CELLS: 0 /100 WBC
PLATELET # BLD: 191 THOU/MM3 (ref 130–400)
PMV BLD AUTO: 10.7 FL (ref 9.4–12.4)
POTASSIUM SERPL-SCNC: 4 MEQ/L (ref 3.5–5.2)
RBC # BLD: 4.47 MILL/MM3 (ref 4.2–5.4)
SEG NEUTROPHILS: 43.2 %
SEGMENTED NEUTROPHILS ABSOLUTE COUNT: 2.3 THOU/MM3 (ref 1.8–7.7)
SODIUM BLD-SCNC: 141 MEQ/L (ref 135–145)
TOTAL PROTEIN: 7.2 G/DL (ref 6.1–8)
TRIGL SERPL-MCNC: 155 MG/DL (ref 0–199)
TSH SERPL DL<=0.05 MIU/L-ACNC: 3.9 UIU/ML (ref 0.4–4.2)
WBC # BLD: 5.4 THOU/MM3 (ref 4.8–10.8)

## 2022-04-27 ENCOUNTER — OFFICE VISIT (OUTPATIENT)
Dept: FAMILY MEDICINE CLINIC | Age: 82
End: 2022-04-27
Payer: MEDICARE

## 2022-04-27 VITALS
OXYGEN SATURATION: 97 % | BODY MASS INDEX: 34.75 KG/M2 | RESPIRATION RATE: 16 BRPM | WEIGHT: 177 LBS | HEART RATE: 70 BPM | SYSTOLIC BLOOD PRESSURE: 138 MMHG | DIASTOLIC BLOOD PRESSURE: 60 MMHG | HEIGHT: 60 IN | TEMPERATURE: 96.9 F

## 2022-04-27 DIAGNOSIS — R42 LIGHTHEADEDNESS: ICD-10-CM

## 2022-04-27 DIAGNOSIS — J01.10 ACUTE NON-RECURRENT FRONTAL SINUSITIS: Primary | ICD-10-CM

## 2022-04-27 PROCEDURE — 99214 OFFICE O/P EST MOD 30 MIN: CPT | Performed by: NURSE PRACTITIONER

## 2022-04-27 RX ORDER — SULFAMETHOXAZOLE AND TRIMETHOPRIM 800; 160 MG/1; MG/1
1 TABLET ORAL 2 TIMES DAILY
Qty: 20 TABLET | Refills: 0 | Status: SHIPPED | OUTPATIENT
Start: 2022-04-27 | End: 2022-05-07

## 2022-04-27 ASSESSMENT — ENCOUNTER SYMPTOMS
GASTROINTESTINAL NEGATIVE: 1
RESPIRATORY NEGATIVE: 1
EYES NEGATIVE: 1
SINUS PRESSURE: 1

## 2022-04-27 NOTE — PROGRESS NOTES
Marino Carpio is a 80 y.o. female whopresents today for :  Chief Complaint   Patient presents with    Dizziness     Light-headed       HPI:     HPI  Pt here with a couple of symptoms. First is has frontal sinus pain and congestion. Second is a continued feeling of lightheadedness. She was recently in with vertigo and went to PT and that is better but just left with \"lightheadeness\"  This started after she started the neupro patch with neurology     Patient Active Problem List   Diagnosis    Pure hypercholesterolemia    Vitamin D deficiency    Hypothyroidism    Osteopenia    Parkinson disease ([address]    Restless leg syndrome    Personal history of DVT (deep vein thrombosis)    Acute diverticulitis    Left lower quadrant pain    Hepatic steatosis    Tubular adenoma    S/P partial resection of colon        Past Medical History:   Diagnosis Date    Diverticulosis     Fatty liver     GERD (gastroesophageal reflux disease)     was on prilosec in past    Hiatal hernia     Hx of blood clots     Hyperlipidemia     Hypertension     Hypothyroidism     Incontinence     urinary    Osteoarthritis     Parkinson's disease ([address]     PONV (postoperative nausea and vomiting)     Prolonged emergence from general anesthesia     Stress fracture     left distal radius    Tubular adenoma     colononoscopy 2010- Dr. Wiley Rater; large sigmoid polyp colonoscopy 2016    Vertigo       Past Surgical History:   Procedure Laterality Date    BLADDER SUSPENSION      CATARACT REMOVAL  05 23 2015   Geraldine Gardner      COLONOSCOPY  2010, june 2016    Dr. Inez Beard- polyps removed, Dr. Zenia Day  03/2020    Dr. Cleve Ramos Right 08/01/2016    plate and screw in lower right leg     HEMICOLECTOMY Right 6/4/2020    ROBOTIC RIGHT COLECTOMY performed by Shannon Spain MD at [address] [place] [address] Right 08/23/2016    Plate inserted into rt. capsule 1    ondansetron (ZOFRAN ODT) 4 MG disintegrating tablet Take 1 tablet by mouth every 8 hours as needed for Nausea or Vomiting (Patient not taking: Reported on 8/12/2021) 15 tablet 0     No current facility-administered medications for this visit. Allergies   Allergen Reactions    Penicillins      Unsure of reaction, childhood    Lipitor [Atorvastatin] Other (See Comments)     Muscle aches     Health Maintenance   Topic Date Due    Shingles Vaccine (2 of 3) 07/28/2016    COVID-19 Vaccine (3 - Booster for Moderna series) 07/25/2021    Depression Screen  09/28/2022    Annual Wellness Visit (AWV)  10/05/2022    Colorectal Cancer Screen  03/02/2023    Lipids  04/06/2023    TSH  04/06/2023    DTaP/Tdap/Td vaccine (2 - Td or Tdap) 10/19/2028    DEXA (modify frequency per FRAX score)  Completed    Flu vaccine  Completed    Pneumococcal 65+ years Vaccine  Completed    Hepatitis A vaccine  Aged Out    Hepatitis B vaccine  Aged Out    Hib vaccine  Aged Out    Meningococcal (ACWY) vaccine  Aged Out       Subjective:     Review of Systems   Constitutional: Negative. HENT: Positive for congestion and sinus pressure. Eyes: Negative. Respiratory: Negative. Cardiovascular: Negative. Gastrointestinal: Negative. Musculoskeletal: Negative. Skin: Negative. Neurological: Positive for light-headedness. Objective:     Vitals:    04/27/22 0956   BP: 138/60   Site: Left Upper Arm   Position: Sitting   Cuff Size: Medium Adult   Pulse: 70   Resp: 16   Temp: 96.9 °F (36.1 °C)   TempSrc: Temporal   SpO2: 97%   Weight: 177 lb (80.3 kg)   Height: 5' (1.524 m)       Physical Exam  Constitutional:       Appearance: She is well-developed. HENT:      Head: Normocephalic. Right Ear: Tympanic membrane and external ear normal.      Left Ear: Tympanic membrane and external ear normal.      Nose: Nose normal.   Cardiovascular:      Rate and Rhythm: Normal rate and regular rhythm. Heart sounds: Normal heart sounds. No murmur heard. No friction rub. No gallop. Pulmonary:      Effort: Pulmonary effort is normal.      Breath sounds: Normal breath sounds. No wheezing or rales. Abdominal:      General: Bowel sounds are normal.      Palpations: Abdomen is soft. Tenderness: There is no abdominal tenderness. There is no guarding. Musculoskeletal:         General: Normal range of motion. Cervical back: Normal range of motion and neck supple. Lymphadenopathy:      Cervical: No cervical adenopathy. Skin:     General: Skin is warm. Neurological:      Mental Status: She is alert and oriented to person, place, and time. Motor: Tremor present. Deep Tendon Reflexes: Reflexes are normal and symmetric. Comments: Shuffled gait           Assessment:      Diagnosis Orders   1. Acute non-recurrent frontal sinusitis  sulfamethoxazole-trimethoprim (BACTRIM DS;SEPTRA DS) 800-160 MG per tablet   2. Lightheadedness         Plan:      No follow-ups on file. No orders of the defined types were placed in this encounter. Orders Placed This Encounter   Medications    sulfamethoxazole-trimethoprim (BACTRIM DS;SEPTRA DS) 800-160 MG per tablet     Sig: Take 1 tablet by mouth 2 times daily for 10 days     Dispense:  20 tablet     Refill:  0      See orders  I suspect her lightheadedness are more side effects of the neupro. Recommend not use for a week and see if resolves    Patient given educational materials - seepatient instructions. Discussed use, benefit, and side effects of prescribed medications. All patient questions answered. Pt voiced understanding. Patient agreed withtreatment plan. Follow up as directed.      Electronically signed by RISSA Seo CNP on 4/27/2022 at 1:00 PM

## 2022-05-11 ENCOUNTER — OFFICE VISIT (OUTPATIENT)
Dept: FAMILY MEDICINE CLINIC | Age: 82
End: 2022-05-11
Payer: MEDICARE

## 2022-05-11 VITALS
DIASTOLIC BLOOD PRESSURE: 64 MMHG | HEIGHT: 60 IN | OXYGEN SATURATION: 97 % | HEART RATE: 68 BPM | RESPIRATION RATE: 12 BRPM | WEIGHT: 177 LBS | TEMPERATURE: 97 F | SYSTOLIC BLOOD PRESSURE: 128 MMHG | BODY MASS INDEX: 34.75 KG/M2

## 2022-05-11 DIAGNOSIS — E78.00 PURE HYPERCHOLESTEROLEMIA: ICD-10-CM

## 2022-05-11 DIAGNOSIS — R53.82 CHRONIC FATIGUE: ICD-10-CM

## 2022-05-11 DIAGNOSIS — E03.9 ACQUIRED HYPOTHYROIDISM: ICD-10-CM

## 2022-05-11 DIAGNOSIS — L95.9 VASCULITIS LIMITED TO SKIN: Primary | ICD-10-CM

## 2022-05-11 PROCEDURE — 99213 OFFICE O/P EST LOW 20 MIN: CPT | Performed by: NURSE PRACTITIONER

## 2022-05-11 RX ORDER — LEVOTHYROXINE SODIUM 0.05 MG/1
TABLET ORAL
Qty: 90 TABLET | Refills: 3 | Status: SHIPPED | OUTPATIENT
Start: 2022-05-11

## 2022-05-11 RX ORDER — ROSUVASTATIN CALCIUM 10 MG/1
TABLET, COATED ORAL
Qty: 90 TABLET | Refills: 3 | Status: SHIPPED | OUTPATIENT
Start: 2022-05-11

## 2022-05-11 ASSESSMENT — ENCOUNTER SYMPTOMS
RESPIRATORY NEGATIVE: 1
GASTROINTESTINAL NEGATIVE: 1
EYES NEGATIVE: 1

## 2022-05-11 NOTE — PROGRESS NOTES
Dayami Booth is a 80 y.o. female whopresents today for :  Chief Complaint   Patient presents with    Rash       HPI:     HPI  Pt had rash that started last Friday. Knees and below. Many small red papules. Pt had shingles vaccine on Tuesday. Rash was asymptomatic  Pt was on bactrim as well for a sinus infection, that finished Thursday.   Rash is much better now     Patient Active Problem List   Diagnosis    Pure hypercholesterolemia    Vitamin D deficiency    Hypothyroidism    Osteopenia    Parkinson disease ([address]    Restless leg syndrome    Personal history of DVT (deep vein thrombosis)    Acute diverticulitis    Left lower quadrant pain    Hepatic steatosis    Tubular adenoma    S/P partial resection of colon        Past Medical History:   Diagnosis Date    Diverticulosis     Fatty liver     GERD (gastroesophageal reflux disease)     was on prilosec in past    Hiatal hernia     Hx of blood clots     Hyperlipidemia     Hypertension     Hypothyroidism     Incontinence     urinary    Osteoarthritis     Parkinson's disease ([address]     PONV (postoperative nausea and vomiting)     Prolonged emergence from general anesthesia     Stress fracture     left distal radius    Tubular adenoma     colononoscopy 2010- Dr. Nell Coronel; large sigmoid polyp colonoscopy 2016    Vertigo       Past Surgical History:   Procedure Laterality Date    BLADDER SUSPENSION      CATARACT REMOVAL  05 23 2015   Arnulfoclovis Blake      COLONOSCOPY  2010, june 2016    Dr. Nell Coronel- polyps removed, Dr. Dareen Siemens  03/2020    Dr. Baker Listen Right 08/01/2016    plate and screw in lower right leg     HEMICOLECTOMY Right 6/4/2020    ROBOTIC RIGHT COLECTOMY performed by Blake Verde MD at [facility] Right 08/23/2016    Plate inserted into rt. leg    POLYPECTOMY      [facility] Dr. Tyler Rowe ENDOSCOPY  2020    Dr. Ruiz Friendly release     Family History   Problem Relation Age of Onset    Parkinsonism Sister     Cancer Father     Colon Cancer Brother     Lung Cancer Brother     Heart Disease Paternal Grandmother         >56 yo     Social History     Tobacco Use    Smoking status: Former Smoker     Packs/day: 0.25     Years: 1.00     Pack years: 0.25     Types: Cigarettes     Quit date: 1959     Years since quittin.8    Smokeless tobacco: Never Used   Substance Use Topics    Alcohol use: No     Alcohol/week: 0.0 standard drinks      Current Outpatient Medications   Medication Sig Dispense Refill    levothyroxine (SYNTHROID) 50 MCG tablet TAKE 1 TABLET DAILY FOR LOW THYROID 90 tablet 3    rosuvastatin (CRESTOR) 10 MG tablet TAKE 1 TABLET NIGHTLY 90 tablet 3    rotigotine (NEUPRO) 1 MG/24HR PT24 Place 1 patch onto the skin daily 28 patch 0    nystatin (MYCOSTATIN) 949588 UNIT/GM cream Apply topically 2 times daily. 30 g 1    Omega-3 300 MG CAPS Take 1 capsule by mouth 4 times daily      CALCIUM PO Take 1,300 mg by mouth daily      pramipexole (MIRAPEX) 0.5 MG tablet Take 1 tablet by mouth 3 times daily 270 tablet 3    ondansetron (ZOFRAN ODT) 4 MG disintegrating tablet Take 1 tablet by mouth every 8 hours as needed for Nausea or Vomiting 15 tablet 0    Cholecalciferol (VITAMIN D3) 125 MCG (5000 UT) TABS Take by mouth      Fexofenadine HCl (ALLEGRA ALLERGY PO) Take by mouth as needed       NONFORMULARY Respire Pink Micro Sleep Appliance      ACETAMINOPHEN PO Take 1,000 mg by mouth daily as needed (pain)       gabapentin (NEURONTIN) 300 MG capsule Take 1 capsule by mouth nightly for 90 days. 90 capsule 1     No current facility-administered medications for this visit.      Allergies   Allergen Reactions    Penicillins      Unsure of reaction, childhood    Bactrim [Sulfamethoxazole-Trimethoprim]     Lipitor [Atorvastatin] Other (See Comments) Muscle aches     Health Maintenance   Topic Date Due    Shingles vaccine (3 of 3) 06/28/2022    Depression Screen  09/28/2022    Annual Wellness Visit (AWV)  10/05/2022    Colorectal Cancer Screen  03/02/2023    Lipids  04/06/2023    DTaP/Tdap/Td vaccine (2 - Td or Tdap) 10/19/2028    DEXA (modify frequency per FRAX score)  Completed    Flu vaccine  Completed    Pneumococcal 65+ years Vaccine  Completed    COVID-19 Vaccine  Completed    Hepatitis A vaccine  Aged Out    Hepatitis B vaccine  Aged Out    Hib vaccine  Aged Out    Meningococcal (ACWY) vaccine  Aged Out       Subjective:     Review of Systems   Constitutional: Negative. HENT: Negative. Eyes: Negative. Respiratory: Negative. Cardiovascular: Negative. Gastrointestinal: Negative. Musculoskeletal: Negative. Skin: Positive for rash. Neurological: Negative. Objective:     Vitals:    05/11/22 0942   BP: 128/64   Site: Left Upper Arm   Position: Sitting   Cuff Size: Medium Adult   Pulse: 68   Resp: 12   Temp: 97 °F (36.1 °C)   TempSrc: Temporal   SpO2: 97%   Weight: 177 lb (80.3 kg)   Height: 5' (1.524 m)       Physical Exam  Constitutional:       Appearance: She is well-developed. HENT:      Head: Normocephalic. Right Ear: Tympanic membrane and external ear normal.      Left Ear: Tympanic membrane and external ear normal.      Nose: Nose normal.   Cardiovascular:      Rate and Rhythm: Normal rate and regular rhythm. Heart sounds: Normal heart sounds. No murmur heard. No friction rub. No gallop. Pulmonary:      Effort: Pulmonary effort is normal.      Breath sounds: Normal breath sounds. No wheezing or rales. Abdominal:      General: Bowel sounds are normal.      Palpations: Abdomen is soft. Tenderness: There is no abdominal tenderness. There is no guarding. Musculoskeletal:         General: Normal range of motion. Cervical back: Normal range of motion and neck supple. Lymphadenopathy:      Cervical: No cervical adenopathy. Skin:     General: Skin is warm. Neurological:      Mental Status: She is alert and oriented to person, place, and time. Deep Tendon Reflexes: Reflexes are normal and symmetric. Assessment:      Diagnosis Orders   1. Vasculitis limited to skin         Plan:      No follow-ups on file. No orders of the defined types were placed in this encounter. No orders of the defined types were placed in this encounter. Most likely this was a reaction to the bactrim rather than the shingrix. Added bactrim to her allergy list  Advised to call back directly if there are further questions, or if these symptoms fail to improve as anticipated or worsen. Patient given educational materials - seepatient instructions. Discussed use, benefit, and side effects of prescribed medications. All patient questions answered. Pt voiced understanding. Patient agreed withtreatment plan. Follow up as directed.      Electronically signed by RISSA Navarro CNP on 5/11/2022 at 12:41 PM

## 2022-07-15 ENCOUNTER — OFFICE VISIT (OUTPATIENT)
Dept: NEUROLOGY | Age: 82
End: 2022-07-15
Payer: MEDICARE

## 2022-07-15 VITALS
WEIGHT: 177 LBS | BODY MASS INDEX: 34.75 KG/M2 | OXYGEN SATURATION: 97 % | RESPIRATION RATE: 16 BRPM | DIASTOLIC BLOOD PRESSURE: 68 MMHG | HEART RATE: 73 BPM | HEIGHT: 60 IN | SYSTOLIC BLOOD PRESSURE: 140 MMHG

## 2022-07-15 DIAGNOSIS — G25.81 RESTLESS LEG SYNDROME: ICD-10-CM

## 2022-07-15 DIAGNOSIS — R25.8 BRADYKINESIA: ICD-10-CM

## 2022-07-15 DIAGNOSIS — G20 PARKINSON DISEASE (HCC): Primary | ICD-10-CM

## 2022-07-15 PROCEDURE — 99214 OFFICE O/P EST MOD 30 MIN: CPT | Performed by: PSYCHIATRY & NEUROLOGY

## 2022-07-15 PROCEDURE — 1123F ACP DISCUSS/DSCN MKR DOCD: CPT | Performed by: PSYCHIATRY & NEUROLOGY

## 2022-07-15 NOTE — PROGRESS NOTES
NEUROLOGY OUT PATIENT FOLLOW UP NOTE:  7/15/52223:04 PM    Si Serum is here for follow up for restless leg, parkinson's disease. Allergies   Allergen Reactions    Penicillins      Unsure of reaction, childhood    Bactrim [Sulfamethoxazole-Trimethoprim]     Lipitor [Atorvastatin] Other (See Comments)     Muscle aches       Current Outpatient Medications:     levothyroxine (SYNTHROID) 50 MCG tablet, TAKE 1 TABLET DAILY FOR LOW THYROID, Disp: 90 tablet, Rfl: 3    rosuvastatin (CRESTOR) 10 MG tablet, TAKE 1 TABLET NIGHTLY, Disp: 90 tablet, Rfl: 3    rotigotine (NEUPRO) 1 MG/24HR PT24, Place 1 patch onto the skin daily, Disp: 28 patch, Rfl: 0    gabapentin (NEURONTIN) 300 MG capsule, Take 1 capsule by mouth nightly for 90 days. , Disp: 90 capsule, Rfl: 1    Omega-3 300 MG CAPS, Take 1 capsule by mouth 4 times daily, Disp: , Rfl:     pramipexole (MIRAPEX) 0.5 MG tablet, Take 1 tablet by mouth 3 times daily, Disp: 270 tablet, Rfl: 3    Cholecalciferol (VITAMIN D3) 125 MCG (5000 UT) TABS, Take by mouth, Disp: , Rfl:     NONFORMULARY, Respire Pink Micro Sleep Appliance, Disp: , Rfl:     ACETAMINOPHEN PO, Take 1,000 mg by mouth daily as needed (pain) , Disp: , Rfl:     nystatin (MYCOSTATIN) 267088 UNIT/GM cream, Apply topically 2 times daily. (Patient not taking: Reported on 7/15/2022), Disp: 30 g, Rfl: 1    CALCIUM PO, Take 1,300 mg by mouth daily (Patient not taking: Reported on 7/15/2022), Disp: , Rfl:     ondansetron (ZOFRAN ODT) 4 MG disintegrating tablet, Take 1 tablet by mouth every 8 hours as needed for Nausea or Vomiting (Patient not taking: Reported on 7/15/2022), Disp: 15 tablet, Rfl: 0    Fexofenadine HCl (ALLEGRA ALLERGY PO), Take by mouth as needed  (Patient not taking: Reported on 7/15/2022), Disp: , Rfl:     I reviewed the past medical history, allergies, medications, social history and family history.        PE:   Vitals:    07/15/22 1354   BP: (!) 140/68   Pulse: 73   Resp: 16   SpO2: 97% thou/mm3    MPV 10.7 9.4 - 12.4 fL    Seg Neutrophils 43.2 %    Lymphocytes 45.8 %    Monocytes 7.9 %    Eosinophils 2.2 %    Basophils 0.9 %    Immature Granulocytes 0.0 %    Segs Absolute 2.3 1.8 - 7.7 thou/mm3    Lymphocytes Absolute 2.5 1.0 - 4.8 thou/mm3    Monocytes Absolute 0.4 0.4 - 1.3 thou/mm3    Eosinophils Absolute 0.1 0.0 - 0.4 thou/mm3    Basophils Absolute 0.0 0.0 - 0.1 thou/mm3    Immature Grans (Abs) 0.00 0.00 - 0.07 thou/mm3    nRBC 0 /100 wbc   Anion Gap   Result Value Ref Range    Anion Gap 12.0 8.0 - 16.0 meq/L   Glomerular Filtration Rate, Estimated   Result Value Ref Range    Est, Glom Filt Rate 80 (A) ml/min/1.73m2          Results for orders placed during the hospital encounter of 09/03/21    MRI BRAIN WO CONTRAST    Narrative  PROCEDURE: MRI BRAIN WO CONTRAST    INDICATION:  Parkinson disease ([address] Restless leg syndrome, Dizziness. Dizziness and nausea. COMPARISON: MR brain dated 5/14/2021. TECHNIQUE: Multiplanar and multiple spin echo MRI images were obtained of the brain without contrast.    FINDINGS:  Ventricles, cisterns and sulci are symmetric and mildly prominent, commensurate with age. There are mild to moderate patchy areas of T2/FLAIR prolongation in the periventricular, subcortical deep white matter, stable compared to prior exam. No intra or  extra-axial mass is identified. No focal areas of restricted diffusion are present. The major vascular flow voids appear patent. The patient is status post bilateral lens extractions. Orbits are otherwise unremarkable. There is trace mucosal thickening in the axillar sinuses, similar to prior exam. Mastoid air cells are clear. Impression  1. No evidence of acute intracranial abnormality. 2. 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          **This report has been created using voice recognition software.  It may contain minor errors which are inherent in voice recognition technology. **    Final report electronically signed by Dr. Elaine Shaffer MD on 9/3/2021 3:49 PM    Results for orders placed during the hospital encounter of 05/14/21    MRI BRAIN W WO CONTRAST    Narrative  PROCEDURE: MRI BRAIN W [address] INFORMATIONNew onset headache. New-onset severe episodes of headaches on the top of her head. Symptoms for 6 days. Doesn't normally get headaches. COMPARISON: Head CT 4/20/2016. TECHNIQUE: Multiplanar and multiple spin echo T1 and T2-weighted images were obtained through the brain before and after the administration of intravenous contrast.    FINDINGS:        The diffusion-weighted images are normal. The brain volume is mildly reduced. .There are no intra-or extra-axial collections. There is no hydrocephalus, midline shift or mass effect. On the FLAIR and T2-weighted sequences, there is a moderate amount of signal hyperintensity scattered in the deep and subcortical white matter of the brain. The pattern and distribution is most consistent with moderate severity chronic small vessel  ischemic changes. On the gradient echo T2-weighted images, there is no susceptibility artifact present. There is no abnormal enhancement in the brain. The major intracranial vascular flow voids are present. The midline craniocervical junction structures are normal.  The brainstem and pituitary gland are normal.    Impression  1. No acute findings. 2. Moderate severity chronic small vessel ischemic changes. 3. Mild global volume loss. **This report has been created using voice recognition software. It may contain minor errors which are inherent in voice recognition technology. **      Final report electronically signed by Dr. Amol Coppola on 5/14/2021 3:36 PM    No results found for this or any previous visit.     Results for orders placed during the hospital encounter of 06/22/21    CT HEAD WO CONTRAST    Narrative  PROCEDURE: CT HEAD WO right arm swing and decreased clearance with the right. There is no tremor on exam. Her symptoms appear to be worse. The patient was counseled about her symptoms, medication options and side effects. After detailed discussion with patient we agreed on the following plan. Plan:  Start Sinemet 10/100 mg three times a day. Stop Neupro. Continue Neurontin 300 mg nightly. Refills given  Continue with Mirapex 0.5 mg three times a day. Follow up in 4 months or sooner if needed.    Call if any questions or concerns      Total time 34 min    Sherrilyn Severin, MD

## 2022-07-15 NOTE — PATIENT INSTRUCTIONS
Start Sinemet 10/100 mg three times a day. Stop Neupro. Continue Neurontin 300 mg nightly. Refills given  Continue with Mirapex 0.5 mg three times a day. Follow up in 4 months or sooner if needed.    Call if any questions or concerns

## 2022-07-23 DIAGNOSIS — G20 PARKINSON DISEASE (HCC): ICD-10-CM

## 2022-07-23 DIAGNOSIS — G25.81 RESTLESS LEG SYNDROME: ICD-10-CM

## 2022-07-25 RX ORDER — GABAPENTIN 300 MG/1
CAPSULE ORAL
Qty: 90 CAPSULE | Refills: 1 | Status: SHIPPED | OUTPATIENT
Start: 2022-07-25 | End: 2023-01-24

## 2022-09-05 DIAGNOSIS — G25.81 RESTLESS LEG SYNDROME: ICD-10-CM

## 2022-09-06 ENCOUNTER — PATIENT MESSAGE (OUTPATIENT)
Dept: FAMILY MEDICINE CLINIC | Age: 82
End: 2022-09-06

## 2022-09-06 RX ORDER — METHYLPREDNISOLONE 4 MG/1
TABLET ORAL
Qty: 1 KIT | Refills: 0 | Status: SHIPPED | OUTPATIENT
Start: 2022-09-06 | End: 2022-09-12

## 2022-09-06 RX ORDER — PRAMIPEXOLE DIHYDROCHLORIDE 0.5 MG/1
TABLET ORAL
Qty: 270 TABLET | Refills: 3 | Status: SHIPPED | OUTPATIENT
Start: 2022-09-06

## 2022-09-06 NOTE — TELEPHONE ENCOUNTER
From: Tere Pardo  To: Wolf Ludwig  Sent: 9/6/2022 7:07 AM EDT  Subject: Bee sting    was bitten by a bee yesterday. It is swollen, red, itches, woke me up at 4 am this morning.  Is there something that can be prescribed for this or do I need to visit first. [phone]

## 2022-09-22 RX ORDER — NYSTATIN 100000 U/G
CREAM TOPICAL
Qty: 30 G | Refills: 1 | Status: SHIPPED | OUTPATIENT
Start: 2022-09-22

## 2022-10-01 SDOH — HEALTH STABILITY: PHYSICAL HEALTH: ON AVERAGE, HOW MANY DAYS PER WEEK DO YOU ENGAGE IN MODERATE TO STRENUOUS EXERCISE (LIKE A BRISK WALK)?: 0 DAYS

## 2022-10-01 SDOH — HEALTH STABILITY: PHYSICAL HEALTH: ON AVERAGE, HOW MANY MINUTES DO YOU ENGAGE IN EXERCISE AT THIS LEVEL?: 10 MIN

## 2022-10-01 ASSESSMENT — PATIENT HEALTH QUESTIONNAIRE - PHQ9
SUM OF ALL RESPONSES TO PHQ9 QUESTIONS 1 & 2: 0
SUM OF ALL RESPONSES TO PHQ QUESTIONS 1-9: 0
2. FEELING DOWN, DEPRESSED OR HOPELESS: 0
SUM OF ALL RESPONSES TO PHQ QUESTIONS 1-9: 0
1. LITTLE INTEREST OR PLEASURE IN DOING THINGS: 0

## 2022-10-01 ASSESSMENT — LIFESTYLE VARIABLES
HOW OFTEN DO YOU HAVE A DRINK CONTAINING ALCOHOL: NEVER
HOW OFTEN DO YOU HAVE A DRINK CONTAINING ALCOHOL: 1
HOW MANY STANDARD DRINKS CONTAINING ALCOHOL DO YOU HAVE ON A TYPICAL DAY: PATIENT DOES NOT DRINK
HOW MANY STANDARD DRINKS CONTAINING ALCOHOL DO YOU HAVE ON A TYPICAL DAY: 0
HOW OFTEN DO YOU HAVE SIX OR MORE DRINKS ON ONE OCCASION: 1

## 2022-10-05 ENCOUNTER — OFFICE VISIT (OUTPATIENT)
Dept: FAMILY MEDICINE CLINIC | Age: 82
End: 2022-10-05
Payer: MEDICARE

## 2022-10-05 VITALS
RESPIRATION RATE: 16 BRPM | DIASTOLIC BLOOD PRESSURE: 70 MMHG | HEART RATE: 67 BPM | TEMPERATURE: 97.7 F | WEIGHT: 180.4 LBS | SYSTOLIC BLOOD PRESSURE: 128 MMHG | BODY MASS INDEX: 35.23 KG/M2

## 2022-10-05 DIAGNOSIS — Z00.00 MEDICARE ANNUAL WELLNESS VISIT, SUBSEQUENT: Primary | ICD-10-CM

## 2022-10-05 DIAGNOSIS — G20 PARKINSON DISEASE (HCC): ICD-10-CM

## 2022-10-05 DIAGNOSIS — Z23 FLU VACCINE NEED: ICD-10-CM

## 2022-10-05 DIAGNOSIS — E66.01 SEVERE OBESITY (BMI 35.0-39.9) WITH COMORBIDITY (HCC): ICD-10-CM

## 2022-10-05 DIAGNOSIS — R42 VERTIGO: ICD-10-CM

## 2022-10-05 PROCEDURE — 1123F ACP DISCUSS/DSCN MKR DOCD: CPT | Performed by: NURSE PRACTITIONER

## 2022-10-05 PROCEDURE — 90694 VACC AIIV4 NO PRSRV 0.5ML IM: CPT | Performed by: NURSE PRACTITIONER

## 2022-10-05 PROCEDURE — G0008 ADMIN INFLUENZA VIRUS VAC: HCPCS | Performed by: NURSE PRACTITIONER

## 2022-10-05 PROCEDURE — G0439 PPPS, SUBSEQ VISIT: HCPCS | Performed by: NURSE PRACTITIONER

## 2022-10-05 SDOH — ECONOMIC STABILITY: FOOD INSECURITY: WITHIN THE PAST 12 MONTHS, THE FOOD YOU BOUGHT JUST DIDN'T LAST AND YOU DIDN'T HAVE MONEY TO GET MORE.: NEVER TRUE

## 2022-10-05 SDOH — ECONOMIC STABILITY: FOOD INSECURITY: WITHIN THE PAST 12 MONTHS, YOU WORRIED THAT YOUR FOOD WOULD RUN OUT BEFORE YOU GOT MONEY TO BUY MORE.: NEVER TRUE

## 2022-10-05 ASSESSMENT — SOCIAL DETERMINANTS OF HEALTH (SDOH): HOW HARD IS IT FOR YOU TO PAY FOR THE VERY BASICS LIKE FOOD, HOUSING, MEDICAL CARE, AND HEATING?: NOT HARD AT ALL

## 2022-10-05 NOTE — PROGRESS NOTES
Medicare Annual Wellness Visit    Samantha Valadez is here for Medicare AWV, Dizziness (Episode of vertigo at dentist office last week/), Discuss Medications, Immunizations (Flu vaccine ), and Cerumen Impaction (Patient stated that having ears washed with water causing dizziness )    Chief Complaint   Patient presents with    Medicare AWV    Dizziness     Episode of vertigo at dentist office last week      Discuss Medications    Immunizations     Flu vaccine     Cerumen Impaction     Patient stated that having ears washed with water causing dizziness        Assessment & Plan   Medicare annual wellness visit, subsequent  Flu vaccine need  -     Influenza, FLUAD, (age 72 y+), IM, Preservative Free, 0.5 mL  Parkinson disease ([facility] [address]  Severe obesity (BMI 35.0-39. 9) with comorbidity ([facility] [address]  Vertigo    Recommendations for Preventive Services Due: see orders and patient instructions/AVS.  Recommended screening schedule for the next 5-10 years is provided to the patient in written form: see Patient Instructions/AVS.     Return for Medicare Annual Wellness Visit in 1 year. Subjective   The following acute and/or chronic problems were also addressed today:  Reviewed her recent episode of vertigo. This has been an issue but greatly exacerbated after recent dental visit. Patient's complete Health Risk Assessment and screening values have been reviewed and are found in Flowsheets. The following problems were reviewed today and where indicated follow up appointments were made and/or referrals ordered.     Positive Risk Factor Screenings with Interventions:    Fall Risk:  Do you feel unsteady or are you worried about falling? : (!) yes  2 or more falls in past year?: no  Fall with injury in past year?: no   Fall Risk Interventions:    Home safety tips provided            General Health and ACP:  General  In general, how would you say your health is?: Fair  In the past 7 days, have you experienced any of the following: New or Increased Pain, New or Increased Fatigue, Loneliness, Social Isolation, Stress or Anger?: (!) Yes  Select all that apply: (!) New or Increased Fatigue  Do you get the social and emotional support that you need?: Yes  Do you have a Living Will?: Yes    Advance Directives       Power of  Living Will ACP-Advance Directive ACP-Power of     Not on File Filed on 05/31/19 Filed Not on File        General Health Risk Interventions:  Fatigue: medication adjusted- held gabapentin    Health Habits/Nutrition:  Physical Activity: Inactive    Days of Exercise per Week: 0 days    Minutes of Exercise per Session: 10 min     Have you lost any weight without trying in the past 3 months?: No     Have you seen the dentist within the past year?: Yes  Health Habits/Nutrition Interventions:  Inadequate physical activity:  patient is not ready to increase his/her physical activity level at this time     Safety:  Do you have working smoke detectors?: (!) No  Do you have any tripping hazards - loose or unsecured carpets or rugs?: No  Do you have any tripping hazards - clutter in doorways, halls, or stairs?: No  Do you have either shower bars, grab bars, non-slip mats or non-slip surfaces in your shower or bathtub?: Yes  Do all of your stairways have a railing or banister?: Yes  Do you always fasten your seatbelt when you are in a car?: Yes  Safety Interventions:  Home safety tips provided    ADLs:  In the past 7 days, did you need help from others to perform any of the following everyday activities: Eating, dressing, grooming, bathing, toileting, or walking/balance?: (!) Yes  Select all that apply: (!) Walking/Balance  In the past 7 days, did you need help from others to take care of any of the following: Laundry, housekeeping, banking/finances, shopping, telephone use, food preparation, transportation, or taking medications?: No  ADL Interventions:  Holding gabapentin to see if improves          Objective   Vitals: 10/05/22 1358   BP: 128/70   Site: Left Upper Arm   Position: Sitting   Cuff Size: Large Adult   Pulse: 67   Resp: 16   Temp: 97.7 °F (36.5 °C)   TempSrc: Temporal   Weight: 180 lb 6.4 oz (81.8 kg)      Body mass index is 35.23 kg/m². General Appearance: alert and oriented to person, place and time, well developed and well- nourished, in no acute distress  Skin: warm and dry, no rash or erythema  Head: normocephalic and atraumatic  Eyes: pupils equal, round, and reactive to light, extraocular eye movements intact, conjunctivae normal  ENT: left ear impacted. Did clear with currette  Neck: supple and non-tender without mass, no thyromegaly or thyroid nodules, no cervical lymphadenopathy  Pulmonary/Chest: clear to auscultation bilaterally- no wheezes, rales or rhonchi, normal air movement, no respiratory distress  Cardiovascular: normal rate, regular rhythm, normal S1 and S2, no murmurs, rubs, clicks, or gallops, distal pulses intact, no carotid bruits  Abdomen: soft, non-tender, non-distended, normal bowel sounds, no masses or organomegaly  Extremities: no cyanosis, clubbing or edema  Musculoskeletal: normal range of motion, no joint swelling, deformity or tenderness  Neurologic: shuffled gait, minor tremor,        Allergies   Allergen Reactions    Penicillins      Unsure of reaction, childhood    Bactrim [Sulfamethoxazole-Trimethoprim]     Lipitor [Atorvastatin] Other (See Comments)     Muscle aches     Prior to Visit Medications    Medication Sig Taking? Authorizing Provider   nystatin (MYCOSTATIN) 145112 UNIT/GM cream Apply topically 2 times daily. Yes RISSA Hung - CNP   pramipexole (MIRAPEX) 0.5 MG tablet TAKE 1 TABLET THREE TIMES A DAY Yes Vickie Goodman MD   gabapentin (NEURONTIN) 300 MG capsule TAKE 1 CAPSULE NIGHTLY Yes Vickie Goodman MD   carbidopa-levodopa (SINEMET)  MG per tablet Take one tablet by mouth three times a day, every 5 hours during awake hours.  Yes Vickie Goodman MD   levothyroxine (SYNTHROID) 50 MCG tablet TAKE 1 TABLET DAILY FOR LOW THYROID Yes RISSA Hung CNP   rosuvastatin (CRESTOR) 10 MG tablet TAKE 1 TABLET NIGHTLY Yes RISSA Hung CNP   Omega-3 300 MG CAPS Take 1 capsule by mouth 4 times daily Yes Historical Provider, MD   Cholecalciferol (VITAMIN D3) 125 MCG (5000 UT) TABS Take by mouth Yes Historical Provider, MD   NONFORMULARY Respire Pink Micro Sleep Appliance Yes Historical Provider, MD   ACETAMINOPHEN PO Take 1,000 mg by mouth daily as needed (pain)  Yes Historical Provider, MD   CALCIUM PO Take 1,300 mg by mouth daily  Patient not taking: No sig reported  Historical Provider, MD   ondansetron (ZOFRAN ODT) 4 MG disintegrating tablet Take 1 tablet by mouth every 8 hours as needed for Nausea or Vomiting  Patient not taking: No sig reported  Leora Fleming MD   Fexofenadine HCl (ALLEGRA ALLERGY PO) Take by mouth as needed   Patient not taking: No sig reported  Historical Provider, MD   pravastatin (PRAVACHOL) 40 MG tablet Take 1 tablet by mouth every evening  Viki Phan MD       [facility] (Including outside providers/suppliers regularly involved in providing care):   Patient Care Team:  RISSA Wong CNP as PCP - General (Nurse Practitioner)  RISSA Wong CNP as PCP - [facility] Empaneled Provider     Reviewed and updated this visit:  Allergies  Meds        Diagnosis Orders   1. Medicare annual wellness visit, subsequent        2. Flu vaccine need  Influenza, FLUAD, (age 72 y+), IM, Preservative Free, 0.5 mL      3. Parkinson disease ([address]        4. Severe obesity (BMI 35.0-39. 9) with comorbidity ([address]        5. Vertigo          I suspect some of her balance issues may be medication related. Will hold gabapentin and see if helps.   Pt has been on med for some time and underlying reason she started med was not helped a great deal by gabapentin

## 2022-10-05 NOTE — PROGRESS NOTES
Vaccine Information Sheet, \"Influenza - Inactivated\"  given to Uma Neville, or parent/legal guardian of  Uma Neville and verbalized understanding. Patient responses:    Have you ever had a reaction to a flu vaccine? No  Do you have an allergy to eggs, neomycin or polymixin? No  Do you have an allergy to Thimerosal, contact lens solution, or Merthiolate? No  Have you ever had Guillian [place] Syndrome? No  Do you have any current illness? No  Do you have a temperature above 100 degrees? No  Are you pregnant? N/A  If pregnant, permission obtained from physician? N/A  Do you have an active neurological disorder? No      Flu vaccine given per order. Please see immunization tab. Immunizations Administered       Name Date Dose Route    Influenza, FLUAD, (age 72 y+), Adjuvanted, 0.5mL 10/5/2022 0.5 mL Intramuscular    Site: Deltoid- Right    Lot: [id number]    NDC: 94676-259-76            VIS GIVEN. CONSENT SIGNED  PATIENT TOLERATED WELL.

## 2022-10-05 NOTE — Clinical Note
FREDA  I saw our mutual patient antonietta today. She has been having more vertigo symptoms.   I am holding her gabapentin to see if that is possibly contributing to her imbalance

## 2022-10-05 NOTE — PATIENT INSTRUCTIONS
Personalized Preventive Plan for Sonia Acharya - 10/5/2022  Medicare offers a range of preventive health benefits. Some of the tests and screenings are paid in full while other may be subject to a deductible, co-insurance, and/or copay. Some of these benefits include a comprehensive review of your medical history including lifestyle, illnesses that may run in your family, and various assessments and screenings as appropriate. After reviewing your medical record and screening and assessments performed today your provider may have ordered immunizations, labs, imaging, and/or referrals for you. A list of these orders (if applicable) as well as your Preventive Care list are included within your After Visit Summary for your review. Other Preventive Recommendations:    A preventive eye exam performed by an eye specialist is recommended every 1-2 years to screen for glaucoma; cataracts, macular degeneration, and other eye disorders. A preventive dental visit is recommended every 6 months. Try to get at least 150 minutes of exercise per week or 10,000 steps per day on a pedometer . Order or download the FREE \"Exercise & Physical Activity: Your Everyday Guide\" from [employer]. Call 3-979.672.3717 or search [employer] online. You need 7952-9004 mg of calcium and 3369-4195 IU of vitamin D per day. It is possible to meet your calcium requirement with diet alone, but a vitamin D supplement is usually necessary to meet this goal.  When exposed to the sun, use a sunscreen that protects against both UVA and UVB radiation with an SPF of 30 or greater. Reapply every 2 to 3 hours or after sweating, drying off with a towel, or swimming. Always wear a seat belt when traveling in a car. Always wear a helmet when riding a bicycle or motorcycle.

## 2022-11-14 ENCOUNTER — OFFICE VISIT (OUTPATIENT)
Dept: NEUROLOGY | Age: 82
End: 2022-11-14
Payer: MEDICARE

## 2022-11-14 VITALS
SYSTOLIC BLOOD PRESSURE: 138 MMHG | OXYGEN SATURATION: 95 % | HEART RATE: 67 BPM | RESPIRATION RATE: 16 BRPM | HEIGHT: 60 IN | WEIGHT: 180 LBS | DIASTOLIC BLOOD PRESSURE: 62 MMHG | BODY MASS INDEX: 35.34 KG/M2

## 2022-11-14 DIAGNOSIS — R25.8 BRADYKINESIA: ICD-10-CM

## 2022-11-14 DIAGNOSIS — G25.81 RESTLESS LEG SYNDROME: ICD-10-CM

## 2022-11-14 DIAGNOSIS — G20 PARKINSON DISEASE (HCC): Primary | ICD-10-CM

## 2022-11-14 DIAGNOSIS — R42 DIZZINESS: ICD-10-CM

## 2022-11-14 PROCEDURE — 1123F ACP DISCUSS/DSCN MKR DOCD: CPT | Performed by: NURSE PRACTITIONER

## 2022-11-14 PROCEDURE — 99213 OFFICE O/P EST LOW 20 MIN: CPT | Performed by: NURSE PRACTITIONER

## 2022-11-14 NOTE — PATIENT INSTRUCTIONS
Continue with  Sinemet 10/100 mg three times a day. Continue with Mirapex 0.5 mg three times a day. Follow up in 6 months or sooner if needed.    Call if any questions or concerns

## 2022-11-14 NOTE — PROGRESS NOTES
NEUROLOGY OUT PATIENT FOLLOW UP NOTE:  11/14/202212:10 PM    Saima Lara is here for follow up for restless leg, parkinson's disease. Patient Active Problem List   Diagnosis    Pure hypercholesterolemia    Vitamin D deficiency    Hypothyroidism    Osteopenia    Parkinson disease (HCC)    Restless leg syndrome    Personal history of DVT (deep vein thrombosis)    Acute diverticulitis    Left lower quadrant pain    Hepatic steatosis    Tubular adenoma    S/P partial resection of colon       ROS:  Respiratory : no cough, no shortness of breath  Cardiac: no chest pain. No palpitations. Renal : no flank pain, no hematuria    Skin: no rash      Allergies   Allergen Reactions    Penicillins      Unsure of reaction, childhood    Bactrim [Sulfamethoxazole-Trimethoprim]     Lipitor [Atorvastatin] Other (See Comments)     Muscle aches       Current Outpatient Medications:     nystatin (MYCOSTATIN) 892639 UNIT/GM cream, Apply topically 2 times daily. , Disp: 30 g, Rfl: 1    pramipexole (MIRAPEX) 0.5 MG tablet, TAKE 1 TABLET THREE TIMES A DAY, Disp: 270 tablet, Rfl: 3    carbidopa-levodopa (SINEMET)  MG per tablet, Take one tablet by mouth three times a day, every 5 hours during awake hours. , Disp: 90 tablet, Rfl: 5    levothyroxine (SYNTHROID) 50 MCG tablet, TAKE 1 TABLET DAILY FOR LOW THYROID, Disp: 90 tablet, Rfl: 3    rosuvastatin (CRESTOR) 10 MG tablet, TAKE 1 TABLET NIGHTLY, Disp: 90 tablet, Rfl: 3    Cholecalciferol (VITAMIN D3) 125 MCG (5000 UT) TABS, Take by mouth, Disp: , Rfl:     Fexofenadine HCl (ALLEGRA ALLERGY PO), Take by mouth as needed, Disp: , Rfl:     NONFORMULARY, Respire Pink Micro Sleep Appliance, Disp: , Rfl:     ACETAMINOPHEN PO, Take 1,000 mg by mouth daily as needed (pain) , Disp: , Rfl:     gabapentin (NEURONTIN) 300 MG capsule, TAKE 1 CAPSULE NIGHTLY (Patient not taking: Reported on 11/14/2022), Disp: 90 capsule, Rfl: 1    Omega-3 300 MG CAPS, Take 1 capsule by mouth 4 times daily (Patient not taking: Reported on 11/14/2022), Disp: , Rfl:     CALCIUM PO, Take 1,300 mg by mouth daily (Patient not taking: No sig reported), Disp: , Rfl:     ondansetron (ZOFRAN ODT) 4 MG disintegrating tablet, Take 1 tablet by mouth every 8 hours as needed for Nausea or Vomiting (Patient not taking: No sig reported), Disp: 15 tablet, Rfl: 0    I reviewed the past medical history, allergies, medications, social history and family history. PE:   Vitals:    11/14/22 1202   BP: 138/62   Pulse: 67   Resp: 16   SpO2: 95%   Weight: 180 lb (81.6 kg)   Height: 5' (1.524 m)     General Appearance:  awake, alert, oriented, in no acute distress   Gen: NAD, Language is Intact. Skin: no rash, lesion, dry  to touch warm  Head: no rash, no icterus  Neck: There is no carotid bruits. The Neck is supple. There is no neck lymphadenopathy. Neuro: CN 2-12 grossly intact with no focal deficits. Power 5/5 Throughout symmetric, Reflexes are  symmetric. Long tracts are intact. Cerebellar exam is Intact. Sensory exam is intact to light touch. Gait is intact. There is mild bradykinesia, there is no resting tremor noted. No hypophonia. Her blink rate is intact. Musculoskeletal:  Has no hand arthritis, no limitation of ROM in any of the four extremities.   Lower extremities no edema          DATA:      Results for orders placed or performed in visit on 04/06/22   TSH With Reflex Ft4   Result Value Ref Range    TSH 3.900 0.400 - 4.200 uIU/mL   Lipid Panel   Result Value Ref Range    Cholesterol, Total 161 100 - 199 mg/dL    Triglycerides 155 0 - 199 mg/dL    HDL 54 mg/dL    LDL Calculated 76 mg/dL   Comprehensive Metabolic Panel   Result Value Ref Range    Glucose 96 70 - 108 mg/dL    Creatinine 0.7 0.4 - 1.2 mg/dL    BUN 15 7 - 22 mg/dL    Sodium 141 135 - 145 meq/L    Potassium 4.0 3.5 - 5.2 meq/L    Chloride 102 98 - 111 meq/L    CO2 27 23 - 33 meq/L    Calcium 10.0 8.5 - 10.5 mg/dL    AST 30 5 - 40 U/L    Alkaline Phosphatase 70 38 - 126 U/L    Total Protein 7.2 6.1 - 8.0 g/dL    Albumin 4.1 3.5 - 5.1 g/dL    Total Bilirubin 0.5 0.3 - 1.2 mg/dL    ALT 28 11 - 66 U/L   CBC with Auto Differential   Result Value Ref Range    WBC 5.4 4.8 - 10.8 thou/mm3    RBC 4.47 4.20 - 5.40 mill/mm3    Hemoglobin 13.7 12.0 - 16.0 gm/dl    Hematocrit 42.4 37.0 - 47.0 %    MCV 94.9 81.0 - 99.0 fL    MCH 30.6 26.0 - 33.0 pg    MCHC 32.3 32.2 - 35.5 gm/dl    RDW-CV 13.2 11.5 - 14.5 %    RDW-SD 45.8 (H) 35.0 - 45.0 fL    Platelets 042 565 - 291 thou/mm3    MPV 10.7 9.4 - 12.4 fL    Seg Neutrophils 43.2 %    Lymphocytes 45.8 %    Monocytes 7.9 %    Eosinophils 2.2 %    Basophils 0.9 %    Immature Granulocytes 0.0 %    Segs Absolute 2.3 1.8 - 7.7 thou/mm3    Lymphocytes Absolute 2.5 1.0 - 4.8 thou/mm3    Monocytes Absolute 0.4 0.4 - 1.3 thou/mm3    Eosinophils Absolute 0.1 0.0 - 0.4 thou/mm3    Basophils Absolute 0.0 0.0 - 0.1 thou/mm3    Immature Grans (Abs) 0.00 0.00 - 0.07 thou/mm3    nRBC 0 /100 wbc   Anion Gap   Result Value Ref Range    Anion Gap 12.0 8.0 - 16.0 meq/L   Glomerular Filtration Rate, Estimated   Result Value Ref Range    Est, Glom Filt Rate 80 (A) ml/min/1.73m2            Results for orders placed during the hospital encounter of 09/03/21    MRI BRAIN WO CONTRAST    Narrative  PROCEDURE: MRI BRAIN WO CONTRAST    INDICATION:  Parkinson disease ([address] Restless leg syndrome, Dizziness. Dizziness and nausea. COMPARISON: MR brain dated 5/14/2021. TECHNIQUE: Multiplanar and multiple spin echo MRI images were obtained of the brain without contrast.    FINDINGS:  Ventricles, cisterns and sulci are symmetric and mildly prominent, commensurate with age. There are mild to moderate patchy areas of T2/FLAIR prolongation in the periventricular, subcortical deep white matter, stable compared to prior exam. No intra or  extra-axial mass is identified. No focal areas of restricted diffusion are present. The major vascular flow voids appear patent.  The patient is status post bilateral lens extractions. Orbits are otherwise unremarkable. There is trace mucosal thickening in the axillar sinuses, similar to prior exam. Mastoid air cells are clear. Impression  1. No evidence of acute intracranial abnormality. 2. 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          **This report has been created using voice recognition software. It may contain minor errors which are inherent in voice recognition technology. **    Final report electronically signed by Dr. Farhan Collier MD on 9/3/2021 3:49 PM    Results for orders placed during the hospital encounter of 05/14/21    MRI BRAIN W WO CONTRAST    Narrative  PROCEDURE: MRI BRAIN W [address] INFORMATIONNew onset headache. New-onset severe episodes of headaches on the top of her head. Symptoms for 6 days. Doesn't normally get headaches. COMPARISON: Head CT 4/20/2016. TECHNIQUE: Multiplanar and multiple spin echo T1 and T2-weighted images were obtained through the brain before and after the administration of intravenous contrast.    FINDINGS:        The diffusion-weighted images are normal. The brain volume is mildly reduced. .There are no intra-or extra-axial collections. There is no hydrocephalus, midline shift or mass effect. On the FLAIR and T2-weighted sequences, there is a moderate amount of signal hyperintensity scattered in the deep and subcortical white matter of the brain. The pattern and distribution is most consistent with moderate severity chronic small vessel  ischemic changes. On the gradient echo T2-weighted images, there is no susceptibility artifact present. There is no abnormal enhancement in the brain. The major intracranial vascular flow voids are present. The midline craniocervical junction structures are normal.  The brainstem and pituitary gland are normal.    Impression  1. No acute findings.   2. Moderate severity chronic small vessel ischemic changes. 3. Mild global volume loss. **This report has been created using voice recognition software. It may contain minor errors which are inherent in voice recognition technology. **      Final report electronically signed by Dr. Amadou Carrillo on 5/14/2021 3:36 PM    No results found for this or any previous visit. Results for orders placed during the hospital encounter of 06/22/21    CT HEAD WO CONTRAST    Narrative  PROCEDURE: CT HEAD WO CONTRAST    CLINICAL INFORMATION: Dizziness. Dizziness which began yesterday. This has worsened today. Nausea. Off balance. Unable to walk. COMPARISON: Head CT 4/20/2016. Brain MRI 5/14/2021. TECHNIQUE: Noncontrast 5 mm axial images were obtained through the brain. Sagittal and coronal reconstructions were obtained. All CT scans at this facility use dose modulation, iterative reconstruction, and/or weight-based dosing when appropriate to reduce radiation dose to as low as reasonably achievable. FINDINGS:    There is mild global volume loss. There is no hemorrhage. There are no intra-or extra-axial collections. There is no hydrocephalus, midline shift or mass effect. There is a moderate amount of patchy abnormal low attenuation in the white matter the brain consistent with chronic small  vessel ischemic changes. There is no blurring of the gray-white matter differentiation. There is no gross abnormality in the brainstem and posterior fossa. The paranasal sinuses and mastoid air cells are normally aerated. There is no suspicious calvarial abnormality. Impression  1. Stable CT appearance the brain. No CT evidence of an acute process. 2. Moderate severity chronic small vessel ischemic changes. 3. Given the patient's symptoms, a brain MRI is recommended. **This report has been created using voice recognition software. It may contain minor errors which are inherent in voice recognition technology. **    Final

## 2023-02-08 RX ORDER — NYSTATIN 100000 U/G
CREAM TOPICAL
Qty: 30 G | Refills: 1 | Status: SHIPPED | OUTPATIENT
Start: 2023-02-08

## 2023-03-01 ENCOUNTER — OFFICE VISIT (OUTPATIENT)
Dept: FAMILY MEDICINE CLINIC | Age: 83
End: 2023-03-01

## 2023-03-01 VITALS
BODY MASS INDEX: 35.93 KG/M2 | WEIGHT: 183 LBS | HEART RATE: 68 BPM | RESPIRATION RATE: 14 BRPM | TEMPERATURE: 97.3 F | SYSTOLIC BLOOD PRESSURE: 124 MMHG | OXYGEN SATURATION: 95 % | HEIGHT: 60 IN | DIASTOLIC BLOOD PRESSURE: 56 MMHG

## 2023-03-01 DIAGNOSIS — R77.8 ELEVATED TROPONIN: ICD-10-CM

## 2023-03-01 DIAGNOSIS — Z09 HOSPITAL DISCHARGE FOLLOW-UP: ICD-10-CM

## 2023-03-01 DIAGNOSIS — R00.2 PALPITATIONS: Primary | ICD-10-CM

## 2023-03-01 DIAGNOSIS — E66.01 SEVERE OBESITY (BMI 35.0-39.9) WITH COMORBIDITY (HCC): ICD-10-CM

## 2023-03-01 DIAGNOSIS — G20 PARKINSON DISEASE (HCC): ICD-10-CM

## 2023-03-01 RX ORDER — ASPIRIN 81 MG/1
81 TABLET, CHEWABLE ORAL DAILY
COMMUNITY

## 2023-03-01 SDOH — ECONOMIC STABILITY: HOUSING INSECURITY
IN THE LAST 12 MONTHS, WAS THERE A TIME WHEN YOU DID NOT HAVE A STEADY PLACE TO SLEEP OR SLEPT IN A SHELTER (INCLUDING NOW)?: NO

## 2023-03-01 SDOH — ECONOMIC STABILITY: FOOD INSECURITY: WITHIN THE PAST 12 MONTHS, YOU WORRIED THAT YOUR FOOD WOULD RUN OUT BEFORE YOU GOT MONEY TO BUY MORE.: NEVER TRUE

## 2023-03-01 SDOH — ECONOMIC STABILITY: FOOD INSECURITY: WITHIN THE PAST 12 MONTHS, THE FOOD YOU BOUGHT JUST DIDN'T LAST AND YOU DIDN'T HAVE MONEY TO GET MORE.: NEVER TRUE

## 2023-03-01 SDOH — ECONOMIC STABILITY: INCOME INSECURITY: HOW HARD IS IT FOR YOU TO PAY FOR THE VERY BASICS LIKE FOOD, HOUSING, MEDICAL CARE, AND HEATING?: NOT HARD AT ALL

## 2023-03-01 ASSESSMENT — ENCOUNTER SYMPTOMS
GASTROINTESTINAL NEGATIVE: 1
RESPIRATORY NEGATIVE: 1
EYES NEGATIVE: 1

## 2023-03-01 ASSESSMENT — PATIENT HEALTH QUESTIONNAIRE - PHQ9
SUM OF ALL RESPONSES TO PHQ9 QUESTIONS 1 & 2: 0
1. LITTLE INTEREST OR PLEASURE IN DOING THINGS: 0
2. FEELING DOWN, DEPRESSED OR HOPELESS: 0
SUM OF ALL RESPONSES TO PHQ QUESTIONS 1-9: 0

## 2023-03-01 NOTE — PROGRESS NOTES
Post-Discharge Transitional Care Follow Up    Kaylyn Hdz   YOB: 1940    Date of Office Visit:  3/1/2023  Date of Hospital Admission: 2/22  Date of Hospital Discharge: 2/23    Care management risk score Rising risk (score 2-5) and Complex Care (Scores >=6): No Risk Score On File     Non face to face  following discharge, date last encounter closed (first attempt may have been earlier): *No documented post hospital discharge outreach found in the last 14 days     Call initiated 2 business days of discharge: *No response recorded in the last 14 days    ASSESSMENT/PLAN:   Palpitations  Hospital discharge follow-up  -     [facility] DISCHARGE MEDS RECONCILED W/ CURRENT OUTPATIENT MED LIST  Parkinson disease (HCC)  Severe obesity (BMI 35.0-39.9) with comorbidity (HCC)  Elevated troponin    Medical Decision Making: high complexity  No follow-ups on file.    On this date 3/1/2023 I have spent 30 minutes reviewing previous notes, test results and face to face with the patient discussing the diagnosis and importance of compliance with the treatment plan as well as documenting on the day of the visit.       Subjective:   HPI  Pt reports on 2/22 was awakened byt heart flutter and chest pressure.  Which worsened over time.  Pain began to radiate down left arm  was admitted and had heart cath.  No signifcant blockage.  Added betablocker.  Wearing event monitor  Inpatient course: Discharge summary reviewed- see chart.    Interval history/Current status: feeling ok today.  Does have recent groin sprain that is improving    Patient Active Problem List   Diagnosis    Pure hypercholesterolemia    Vitamin D deficiency    Hypothyroidism    Osteopenia    Parkinson disease (HCC)    Restless leg syndrome    Personal history of DVT (deep vein thrombosis)    Acute diverticulitis    Left lower quadrant pain    Hepatic steatosis    Tubular adenoma    S/P partial resection of colon       Medication list at time of discharge  reviewed: Yes    Medications marked \"taking\" at this time  Outpatient Medications Marked as Taking for the 3/1/23 encounter (Office Visit) with RISSA Linares CNP   Medication Sig Dispense Refill    metoprolol tartrate (LOPRESSOR) 25 MG tablet Take 25 mg by mouth 2 times daily 1/2 tablet two times a day      aspirin 81 MG chewable tablet Take 81 mg by mouth daily      nystatin (MYCOSTATIN) 020132 UNIT/GM cream Apply topically 2 times daily. 30 g 1    carbidopa-levodopa (SINEMET)  MG per tablet Take one tablet by mouth three times a day, every 5 hours during awake hours. 90 tablet 5    pramipexole (MIRAPEX) 0.5 MG tablet TAKE 1 TABLET THREE TIMES A  tablet 3    levothyroxine (SYNTHROID) 50 MCG tablet TAKE 1 TABLET DAILY FOR LOW THYROID 90 tablet 3    rosuvastatin (CRESTOR) 10 MG tablet TAKE 1 TABLET NIGHTLY 90 tablet 3    CALCIUM PO Take 1,300 mg by mouth daily      Cholecalciferol (VITAMIN D3) 125 MCG (5000 UT) TABS Take by mouth      Fexofenadine HCl (ALLEGRA ALLERGY PO) Take by mouth as needed      NONFORMULARY Respire Pink Micro Sleep Appliance      ACETAMINOPHEN PO Take 1,000 mg by mouth daily as needed (pain)           Medications patient taking as of now reconciled against medications ordered at time of hospital discharge: Yes    Review of Systems   Constitutional: Negative. HENT: Negative. Eyes: Negative. Respiratory: Negative. Cardiovascular: Negative. Gastrointestinal: Negative. Musculoskeletal:  Positive for arthralgias. Skin: Negative. Neurological: Negative. Objective:    BP (!) 124/56 (Site: Left Upper Arm, Position: Sitting, Cuff Size: Medium Adult)   Pulse 68   Temp 97.3 °F (36.3 °C) (Temporal)   Resp 14   Ht 5' (1.524 m)   Wt 183 lb (83 kg)   SpO2 95%   BMI 35.74 kg/m²   Physical Exam  Constitutional:       Appearance: She is well-developed. Comments: Pt using walker   HENT:      Head: Normocephalic.       Right Ear: Tympanic membrane and external ear normal.      Left Ear: Tympanic membrane and external ear normal.      Nose: Nose normal.   Cardiovascular:      Rate and Rhythm: Normal rate and regular rhythm. Heart sounds: Normal heart sounds. No murmur heard. No friction rub. No gallop. Pulmonary:      Effort: Pulmonary effort is normal.      Breath sounds: Normal breath sounds. No wheezing or rales. Abdominal:      General: Bowel sounds are normal.      Palpations: Abdomen is soft. Tenderness: There is no abdominal tenderness. There is no guarding. Musculoskeletal:         General: Normal range of motion. Cervical back: Normal range of motion and neck supple. Lymphadenopathy:      Cervical: No cervical adenopathy. Skin:     General: Skin is warm. Neurological:      Mental Status: She is alert and oriented to person, place, and time. Deep Tendon Reflexes: Reflexes are normal and symmetric. Diagnosis Orders   1. Palpitations        2. Hospital discharge follow-up  [facility] DISCHARGE MEDS RECONCILED W/ CURRENT OUTPATIENT MED LIST      3. Parkinson disease ([address]        4. Severe obesity (BMI 35.0-39. 9) with comorbidity ([address]        5. Elevated troponin          Pt has heart monitor on. Sounds like she had atrial fib or SVT  Has fu with cardiology  Fu in 3months    An electronic signature was used to authenticate this note.   --Estephania Carrolls, APRN - CNP

## 2023-05-08 DIAGNOSIS — E78.00 PURE HYPERCHOLESTEROLEMIA: ICD-10-CM

## 2023-05-08 DIAGNOSIS — R53.82 CHRONIC FATIGUE: ICD-10-CM

## 2023-05-08 DIAGNOSIS — E03.9 ACQUIRED HYPOTHYROIDISM: ICD-10-CM

## 2023-05-08 RX ORDER — ROSUVASTATIN CALCIUM 10 MG/1
TABLET, COATED ORAL
Qty: 90 TABLET | Refills: 3 | Status: SHIPPED | OUTPATIENT
Start: 2023-05-08

## 2023-05-08 RX ORDER — LEVOTHYROXINE SODIUM 0.05 MG/1
TABLET ORAL
Qty: 90 TABLET | Refills: 3 | Status: SHIPPED | OUTPATIENT
Start: 2023-05-08

## 2023-05-26 ENCOUNTER — OFFICE VISIT (OUTPATIENT)
Dept: NEUROLOGY | Age: 83
End: 2023-05-26
Payer: MEDICARE

## 2023-05-26 VITALS
BODY MASS INDEX: 35.73 KG/M2 | HEART RATE: 64 BPM | DIASTOLIC BLOOD PRESSURE: 60 MMHG | WEIGHT: 182 LBS | HEIGHT: 60 IN | OXYGEN SATURATION: 97 % | SYSTOLIC BLOOD PRESSURE: 125 MMHG

## 2023-05-26 DIAGNOSIS — G20 PARKINSON DISEASE (HCC): Primary | ICD-10-CM

## 2023-05-26 DIAGNOSIS — R25.8 BRADYKINESIA: ICD-10-CM

## 2023-05-26 DIAGNOSIS — G25.81 RESTLESS LEG SYNDROME: ICD-10-CM

## 2023-05-26 PROCEDURE — 99214 OFFICE O/P EST MOD 30 MIN: CPT | Performed by: PSYCHIATRY & NEUROLOGY

## 2023-05-26 PROCEDURE — 1123F ACP DISCUSS/DSCN MKR DOCD: CPT | Performed by: PSYCHIATRY & NEUROLOGY

## 2023-05-30 ENCOUNTER — NURSE ONLY (OUTPATIENT)
Dept: LAB | Age: 83
End: 2023-05-30

## 2023-05-30 DIAGNOSIS — G25.81 RESTLESS LEG SYNDROME: ICD-10-CM

## 2023-05-30 DIAGNOSIS — R25.8 BRADYKINESIA: ICD-10-CM

## 2023-05-30 DIAGNOSIS — G20 PARKINSON DISEASE (HCC): ICD-10-CM

## 2023-05-31 LAB
FOLATE SERPL-MCNC: 9.1 NG/ML (ref 4.8–24.2)
VIT B12 SERPL-MCNC: 401 PG/ML (ref 211–911)

## 2023-06-07 ENCOUNTER — OFFICE VISIT (OUTPATIENT)
Dept: FAMILY MEDICINE CLINIC | Age: 83
End: 2023-06-07
Payer: MEDICARE

## 2023-06-07 VITALS
TEMPERATURE: 97.1 F | WEIGHT: 180 LBS | RESPIRATION RATE: 16 BRPM | DIASTOLIC BLOOD PRESSURE: 84 MMHG | BODY MASS INDEX: 35.34 KG/M2 | OXYGEN SATURATION: 96 % | SYSTOLIC BLOOD PRESSURE: 124 MMHG | HEIGHT: 60 IN | HEART RATE: 66 BPM

## 2023-06-07 DIAGNOSIS — G20 PARKINSON DISEASE (HCC): ICD-10-CM

## 2023-06-07 DIAGNOSIS — G25.81 RLS (RESTLESS LEGS SYNDROME): ICD-10-CM

## 2023-06-07 DIAGNOSIS — E03.9 ACQUIRED HYPOTHYROIDISM: Primary | ICD-10-CM

## 2023-06-07 DIAGNOSIS — M16.10 ARTHRITIS, HIP: ICD-10-CM

## 2023-06-07 PROCEDURE — 99214 OFFICE O/P EST MOD 30 MIN: CPT | Performed by: NURSE PRACTITIONER

## 2023-06-07 PROCEDURE — 1123F ACP DISCUSS/DSCN MKR DOCD: CPT | Performed by: NURSE PRACTITIONER

## 2023-06-07 RX ORDER — PREGABALIN 50 MG/1
CAPSULE ORAL
Qty: 60 CAPSULE | Refills: 3 | Status: SHIPPED | OUTPATIENT
Start: 2023-06-07 | End: 2023-10-07

## 2023-06-07 ASSESSMENT — ENCOUNTER SYMPTOMS
RESPIRATORY NEGATIVE: 1
GASTROINTESTINAL NEGATIVE: 1
EYES NEGATIVE: 1

## 2023-06-07 NOTE — PROGRESS NOTES
ENDOSCOPY  2020    Dr. Edgardo Seals release     Family History   Problem Relation Age of Onset    Parkinsonism Sister     Cancer Father     Colon Cancer Brother     Lung Cancer Brother     Heart Disease Paternal Grandmother         >58 yo     Social History     Tobacco Use    Smoking status: Former     Packs/day: 0.25     Years: 1.00     Pack years: 0.25     Types: Cigarettes     Quit date: 1959     Years since quittin.9    Smokeless tobacco: Never   Substance Use Topics    Alcohol use: No     Alcohol/week: 0.0 standard drinks      Current Outpatient Medications   Medication Sig Dispense Refill    pregabalin (LYRICA) 50 MG capsule 1 tab qhs times 7 days then 1 tab bid 60 capsule 3    Apixaban (ELIQUIS PO) Take by mouth 1 tablet 2 times daily      carbidopa-levodopa (SINEMET)  MG per tablet Take 1 tablet by mouth 2 times daily 270 tablet 3    levothyroxine (SYNTHROID) 50 MCG tablet TAKE 1 TABLET DAILY FOR LOW THYROID 90 tablet 3    rosuvastatin (CRESTOR) 10 MG tablet TAKE 1 TABLET NIGHTLY 90 tablet 3    metoprolol tartrate (LOPRESSOR) 25 MG tablet Take 1 tablet by mouth 2 times daily 1/2 tablet two times a day      nystatin (MYCOSTATIN) 787766 UNIT/GM cream Apply topically 2 times daily. 30 g 1    pramipexole (MIRAPEX) 0.5 MG tablet TAKE 1 TABLET THREE TIMES A  tablet 3    Omega-3 300 MG CAPS Take 1 capsule by mouth in the morning and at bedtime      Cholecalciferol (VITAMIN D3) 125 MCG (5000 UT) TABS Take by mouth      Fexofenadine HCl (ALLEGRA ALLERGY PO) Take by mouth as needed      NONFORMULARY Respire Pink Micro Sleep Appliance      ACETAMINOPHEN PO Take 1,000 mg by mouth daily as needed (pain)        No current facility-administered medications for this visit.      Allergies   Allergen Reactions    Penicillins      Unsure of reaction, childhood    Bactrim [Sulfamethoxazole-Trimethoprim]     Lipitor [Atorvastatin] Other (See Comments)     Muscle aches

## 2023-06-13 ENCOUNTER — APPOINTMENT (OUTPATIENT)
Dept: GENERAL RADIOLOGY | Age: 83
End: 2023-06-13
Payer: MEDICARE

## 2023-06-13 ENCOUNTER — HOSPITAL ENCOUNTER (EMERGENCY)
Age: 83
Discharge: HOME | End: 2023-06-14
Attending: EMERGENCY MEDICINE
Payer: MEDICARE

## 2023-06-13 DIAGNOSIS — S32.110A CLOSED NONDISPLACED ZONE I FRACTURE OF SACRUM, INITIAL ENCOUNTER (HCC): ICD-10-CM

## 2023-06-13 DIAGNOSIS — S32.9XXA: Primary | ICD-10-CM

## 2023-06-13 PROCEDURE — 72100 X-RAY EXAM L-S SPINE 2/3 VWS: CPT

## 2023-06-13 PROCEDURE — 99284 EMERGENCY DEPT VISIT MOD MDM: CPT

## 2023-06-13 PROCEDURE — 6370000000 HC RX 637 (ALT 250 FOR IP): Performed by: EMERGENCY MEDICINE

## 2023-06-13 PROCEDURE — 96372 THER/PROPH/DIAG INJ SC/IM: CPT

## 2023-06-13 PROCEDURE — 73502 X-RAY EXAM HIP UNI 2-3 VIEWS: CPT

## 2023-06-13 PROCEDURE — 6360000002 HC RX W HCPCS: Performed by: EMERGENCY MEDICINE

## 2023-06-13 RX ORDER — ONDANSETRON 4 MG/1
4 TABLET, ORALLY DISINTEGRATING ORAL ONCE
Status: COMPLETED | OUTPATIENT
Start: 2023-06-13 | End: 2023-06-13

## 2023-06-13 RX ADMIN — HYDROMORPHONE HYDROCHLORIDE 0.5 MG: 1 INJECTION, SOLUTION INTRAMUSCULAR; INTRAVENOUS; SUBCUTANEOUS at 23:03

## 2023-06-13 RX ADMIN — ONDANSETRON 4 MG: 4 TABLET, ORALLY DISINTEGRATING ORAL at 23:03

## 2023-06-13 ASSESSMENT — PAIN DESCRIPTION - ORIENTATION: ORIENTATION: RIGHT

## 2023-06-13 ASSESSMENT — PAIN - FUNCTIONAL ASSESSMENT: PAIN_FUNCTIONAL_ASSESSMENT: 0-10

## 2023-06-13 ASSESSMENT — PAIN SCALES - GENERAL: PAINLEVEL_OUTOF10: 8

## 2023-06-13 ASSESSMENT — PAIN DESCRIPTION - LOCATION: LOCATION: BACK;HIP;GROIN

## 2023-06-13 ASSESSMENT — LIFESTYLE VARIABLES
HOW OFTEN DO YOU HAVE A DRINK CONTAINING ALCOHOL: NEVER
HOW MANY STANDARD DRINKS CONTAINING ALCOHOL DO YOU HAVE ON A TYPICAL DAY: PATIENT DOES NOT DRINK

## 2023-06-14 ENCOUNTER — APPOINTMENT (OUTPATIENT)
Dept: CT IMAGING | Age: 83
End: 2023-06-14
Payer: MEDICARE

## 2023-06-14 VITALS
TEMPERATURE: 97.8 F | RESPIRATION RATE: 15 BRPM | OXYGEN SATURATION: 94 % | HEART RATE: 56 BPM | DIASTOLIC BLOOD PRESSURE: 58 MMHG | SYSTOLIC BLOOD PRESSURE: 148 MMHG

## 2023-06-14 PROCEDURE — 72192 CT PELVIS W/O DYE: CPT

## 2023-06-14 PROCEDURE — 6370000000 HC RX 637 (ALT 250 FOR IP): Performed by: EMERGENCY MEDICINE

## 2023-06-14 ASSESSMENT — PAIN SCALES - GENERAL
PAINLEVEL_OUTOF10: 5
PAINLEVEL_OUTOF10: 1

## 2023-06-14 NOTE — DISCHARGE INSTRUCTIONS
Please take 1 Norco every 8 hours as needed for pain  Please follow-up with your family doctor in 2 days  Please return if worse or new symptoms

## 2023-06-14 NOTE — ED PROVIDER NOTES
[address]  [address]  Phone: [phone]    eMERGENCY dEPARTMENT eNCOUnter           [address]       Chief Complaint   Patient presents with    Back Pain    Hip Pain    Groin Pain       Nurses Notes reviewed and I agree except as noted in the HPI. HISTORY OF PRESENT ILLNESS    Alcala Owen is a 80 y.o. female with multiple medical problems including hypertension and chronic A-fib who presents via private vehicle with above-mentioned complaints. Symptoms started about 10 days ago with low back pain and left groin pain. She denies injury. She said that her pain was mild until tonight which became slightly worse. Her pain is worse with walking. She denies leg numbness or weakness. She denies bladder or bowel dysfunction. She denies saddle paresthesia. She stated that right leg is slightly shorter than her left from remote fracture and she might have been overcompensating on the left side. She was seen by her primary doctor last week who ordered lidocaine which did not help her pain. Patient denies recent trauma. REVIEW OF SYSTEMS     Negative except as mentioned above    PAST MEDICAL HISTORY    has a past medical history of A-fib ([address] Diverticulosis, Fatty liver, GERD (gastroesophageal reflux disease), Hiatal hernia, Hx of blood clots, Hyperlipidemia, Hypertension, Hypothyroidism, Incontinence, Osteoarthritis, Parkinson's disease ([address] PONV (postoperative nausea and vomiting), Prolonged emergence from general anesthesia, Stress fracture, Tubular adenoma, and Vertigo. SURGICAL HISTORY      has a past surgical history that includes Cholecystectomy; Dilation and curettage of uterus; Wrist surgery; bladder suspension; Cataract removal (05 23 2015); Colonoscopy (2010, june 2016); other surgical history (Right, 08/23/2016); polypectomy; Colonoscopy (03/2020);  Upper gastrointestinal endoscopy (03/2020); fracture surgery (Right, 08/01/2016);

## 2023-06-14 NOTE — ED NOTES
Discharge teaching and instructions for condition explained to patient. AVS reviewed. Went over prescriptions with patient. Patient voiced understanding regarding prescriptions, follow up appointments and care of self at home. Pt discharged to home in stable condition with daughter. Taken by wheelchair to private auto.  Norco starter sent home with instruction       Petty Leiva RN  06/14/23 [id number]

## 2023-06-14 NOTE — ED TRIAGE NOTES
Presents with daughter at side. Pt using walker upon arrival. States since December she has had left groin pain radiating down leg. Saw pcp who prescribed lyrica recently. Pain continued until end of may then started on the right side. Pain is  in right lower back/buttocks and groin rated 8/10. She upped her lyrica per PCP request today was first dose. Triage exam room 5. Pain is worse when she first arises after sitting and improves some after a while of moving. Slow ambulation. Pain to groin with palpation by Mally ANTUNEZ.  States radiating inner thigh to knee

## 2023-06-14 NOTE — ED NOTES
Pt returned from xray. Request to remain in [facility]. Updated on plan of care. Daughter at bedside. Lights dimmed for non pharm pain management. Pt refused warm blanket at this time. Call light in reach. Will continue to monitor.       Krista Cartwright RN  06/13/23 [id number]

## 2023-07-18 ENCOUNTER — PATIENT MESSAGE (OUTPATIENT)
Dept: FAMILY MEDICINE CLINIC | Age: 83
End: 2023-07-18

## 2023-07-18 NOTE — TELEPHONE ENCOUNTER
From: Caprice Oconnor  To: Wolf Ludwig  Sent: 7/18/2023 11:13 AM EDT  Subject: Aashish Payton results    Had dexascan on 7/7/23. Have osteoporosis. Ortho doc said could have family physician initiate and manage medication. Are you comfortable with managing the osteoporosis? If not, will need to see specialist in [facility] because ortho does not manage osteoporosis. If you are, we will make an appointment as soon as possible. Thank you.

## 2023-07-21 NOTE — TELEPHONE ENCOUNTER
[facility]   Progress Note              Subjective:  Patient feel fine when seen.  Pt reports headache this am but none now.  No sob, cp, dizziness,or 1 side weakness,  nausea or emesis.  Pt later able to walk half around the unit with rn with no dizziness           I/O's    Intake/Output Summary (Last 24 hours) at 11/3/2022 1519  Last data filed at 11/3/2022 0400  Gross per 24 hour   Intake --   Output 3 ml   Net -3 ml           ALLERGIES:  Egg yolk   (food or med)     Hospital Meds  Current Facility-Administered Medications   Medication Dose Route Frequency Provider Last Rate Last Admin   • acetaminophen (TYLENOL) tablet 500 mg  500 mg Oral Q4H PRN Rosalie Norwood MD   500 mg at 11/03/22 0857   • atenolol (TENORMIN) tablet 25 mg  25 mg Oral Daily Rosalie Norwood MD   25 mg at 11/03/22 0857   • meclizine (ANTIVERT) tablet 25 mg  25 mg Oral TID PRN Rosalie Norwood MD       • ondansetron (ZOFRAN) injection 4 mg  4 mg Intravenous Q6H PRN Rosalie Norwood MD       • sodium chloride 0.9% infusion   Intravenous Continuous Rosalie Norwood MD 50 mL/hr at 11/02/22 2043 New Bag at 11/02/22 2043   • hydrALAZINE (APRESOLINE) injection 10 mg  10 mg Intravenous Q6H PRN Rosalie Norwood MD   10 mg at 11/03/22 0857   • pantoprazole (PROTONIX) EC tablet 40 mg  40 mg Oral Nightly Rosalie Norwood MD   40 mg at 11/02/22 2041        Last Recorded Vitals    SpO2 Readings from Last 3 Encounters:   11/03/22 94%        VITAL SIGNS:     Vital Last Value 24 Hour Range   Temperature 98.4 °F (36.9 °C) (11/03/22 1141) Temp  Min: 97.9 °F (36.6 °C)  Max: 98.6 °F (37 °C)   Pulse 72 (11/03/22 1141) Pulse  Min: 62  Max: 89   Respiratory 16 (11/03/22 0738) Resp  Min: 13  Max: 18   Non-Invasive  Blood Pressure (!) 163/61 (11/03/22 1141) BP  Min: 152/67  Max: 205/72   Pulse Oximetry 94 % (11/03/22 1141) SpO2  Min: 94 %  Max: 98 %     Vital Today Admitted   Weight 75.7 kg (166 lb 14.2 oz) (11/02/22 1957) Weight: 75.7 kg (166 lb 14.2 oz) (11/02/22 1957)   Height N/A  Can we call [facility] and have them send us the results Height: 5' (152.4 cm) (11/02/22 1957)   BMI N/A BMI (Calculated): 32.59 (11/02/22 1957)       Physical Exam:  General: awake, no acute distress, on room air  Eyes:  no conjunctival erythema   Neck:  Palpable nontender left parotid gland  Cardio: S1, S2, RRR, no  gallop or thrills noted.   Pulm: Lungs clear to auscultation bilaterally, no wheeze or rhonchi noted.   GI: Soft, non-tender, nondistended. + bs  :   no CVA tenderness bilaterally  Ext: No  lower extremity edema noted.   Skin:  No jaundice.   Psych: Appropriate mood and affect.   Neuro: move all 4 ext to commands. Non focal Pt appropriately follows commands.    Labs   Recent Labs     11/02/22 1044 11/03/22 0419   WBC 5.6  --    RBC 4.21  --    HGB 12.1 11.4*   HCT 38.0  --      --    MCV 90.3  --    MCH 28.7  --    MCHC 31.8*  --    NRBCRE 0  --          Recent Labs     11/02/22 1044 11/03/22 0419   SODIUM 139 138   POTASSIUM 3.4 3.4   CO2 28 29   ANIONGAP 10 7   GLUCOSE 104* 100*   BUN 14 9   CREATININE 0.79 0.64   BCRAT 18 14   CALCIUM 10.3* 10.0   BILIRUBIN 0.3  --    AST 29  --    GPT 19  --    ALKPT 80  --    GLOB 3.7  --    AGR 1.0  --         No results found     No results for input(s): INR, PT, PTT in the last 72 hours.    No results available in last 24 hours    Imaging  CTA HEAD AND NECK W WO CONTRAST   Final Result      1. No acute intracranial process.      2. Normal posterior circulation. No large arterial occlusion or stenosis in   the head or neck.      3. 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      Electronically Signed by: JOSSY VAZQUEZ MD    Signed on: 11/2/2022 12:49 PM              Cultures  Microbiology Results     None             Assessment/Plan:  Active Hospital Problems    Diagnosis    • Vertigo    •  Uncontrolled hypertension    • Mass of left parotid gland    • Hypercalcemia    • Pure hypercholesterolemia       1.  Presyncope, dizziness with room spinning sensation with nausea and emesis  likley due to vertigo related to head position from doing thyroid us but has normal tsh 9 days ago  cta head neg for intracranial process or arterial occlusin or stenosis in head or neck and normal poserior circulation, + chronic small vessle dz  No more dizziness when seen at er and today  Echo shows normal EF with diastolic dysfunction  Advise pt antivert prn for dizziness  Will have rn to walk pt in hallway     2.  Uncontrolled htn at admit  ? Due to # 1  No cva noted on ct head and pt has nonfocal neuro exam  Cont atenolol 25 mg daily which pt took on day of admit   add iv hydralazine prn as no sign of acute cva or posterior circulation compromose on cta head   bp up this am then improve, repeat bp better but still elevated after walking  Per pt, her home bp usually run 130-140's systolic  Will increase atenolol to 50 mg daily  Pt to fu pcp outpt and check bp at home 2x daily.  Call pcp if bp sys > 170 or < 100    3  Hypercalcemia  Seen by endo per pt 11/1 outpt, attribute to vit d ingestion  Ca 10.4 9 days ago, down to 10.3 yest to 10 today post ivf  Recheck ca level outpt     4.  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 Unable to inform pcp via epic  Communication: with patient, nurse     MORE than 35 MINS WERE SPENT ON THIS PATIENTS CARE TODAY, more than  50% of which was spent coordinating patient care.     Primary Care Physician  MD Rosalie Andrews MD [facility]  11/3/2022 3:19 PM

## 2023-07-24 ENCOUNTER — TELEPHONE (OUTPATIENT)
Dept: FAMILY MEDICINE CLINIC | Age: 83
End: 2023-07-24

## 2023-07-24 RX ORDER — NYSTATIN 100000 U/G
CREAM TOPICAL
Qty: 30 G | Refills: 1 | Status: SHIPPED | OUTPATIENT
Start: 2023-07-24

## 2023-07-24 RX ORDER — ALENDRONATE SODIUM 70 MG/1
70 TABLET ORAL
Qty: 12 TABLET | Refills: 1 | Status: SHIPPED | OUTPATIENT
Start: 2023-07-24

## 2023-09-06 ENCOUNTER — OFFICE VISIT (OUTPATIENT)
Dept: FAMILY MEDICINE CLINIC | Age: 83
End: 2023-09-06
Payer: MEDICARE

## 2023-09-06 VITALS
RESPIRATION RATE: 18 BRPM | BODY MASS INDEX: 35.35 KG/M2 | SYSTOLIC BLOOD PRESSURE: 138 MMHG | WEIGHT: 181 LBS | TEMPERATURE: 97 F | DIASTOLIC BLOOD PRESSURE: 70 MMHG | OXYGEN SATURATION: 96 % | HEART RATE: 75 BPM

## 2023-09-06 DIAGNOSIS — S32.9XXA CLOSED NONDISPLACED FRACTURE OF PELVIS, UNSPECIFIED PART OF PELVIS, INITIAL ENCOUNTER (HCC): Primary | ICD-10-CM

## 2023-09-06 DIAGNOSIS — I87.2 VENOUS STASIS DERMATITIS OF BOTH LOWER EXTREMITIES: ICD-10-CM

## 2023-09-06 DIAGNOSIS — E78.00 PURE HYPERCHOLESTEROLEMIA: ICD-10-CM

## 2023-09-06 DIAGNOSIS — G25.81 RLS (RESTLESS LEGS SYNDROME): ICD-10-CM

## 2023-09-06 DIAGNOSIS — G20 PARKINSON DISEASE (HCC): ICD-10-CM

## 2023-09-06 DIAGNOSIS — E03.9 ACQUIRED HYPOTHYROIDISM: ICD-10-CM

## 2023-09-06 PROCEDURE — 99214 OFFICE O/P EST MOD 30 MIN: CPT | Performed by: NURSE PRACTITIONER

## 2023-09-06 PROCEDURE — 1123F ACP DISCUSS/DSCN MKR DOCD: CPT | Performed by: NURSE PRACTITIONER

## 2023-09-06 ASSESSMENT — ENCOUNTER SYMPTOMS
GASTROINTESTINAL NEGATIVE: 1
RESPIRATORY NEGATIVE: 1
EYES NEGATIVE: 1

## 2023-09-06 NOTE — PROGRESS NOTES
Caprice Oconnor is a 80 y.o. female whopresents today for :  Chief Complaint   Patient presents with    3 Month Follow-Up    Skin Tag    Other     Redness to both legs       HPI:     HPI  Pt here for fu. Since being in last she was foung to have a pelvic fx. It is healing. Does complain of venous stasis dermatitis. Also complain of inflamed skin tag on neck.   And wax in left ear    Patient Active Problem List   Diagnosis    Pure hypercholesterolemia    Vitamin D deficiency    Hypothyroidism    Osteopenia    Parkinson disease (HCC)    Restless leg syndrome    Personal history of DVT (deep vein thrombosis)    Acute diverticulitis    Left lower quadrant pain    Hepatic steatosis    Tubular adenoma    S/P partial resection of colon     Past Medical History:   Diagnosis Date    A-fib ([address])     Diverticulosis 04/2018    Fatty liver     GERD (gastroesophageal reflux disease)     was on prilosec in past    Hiatal hernia     Hx of blood clots     Hyperlipidemia     Hypertension     Hypothyroidism     Incontinence     urinary    Osteoarthritis     Parkinson's disease (HCC)     PONV (postoperative nausea and vomiting)     Prolonged emergence from general anesthesia     Stress fracture     left distal radius    Tubular adenoma     colononoscopy 2010- Dr. Anny Diana; large sigmoid polyp colonoscopy 2016    Vertigo 06/2018      Past Surgical History:   Procedure Laterality Date    BLADDER SUSPENSION      CATARACT REMOVAL  05/23/2015    CHOLECYSTECTOMY      COLONOSCOPY  2010, june 2016    Dr. Anny Diana- polyps removed, Dr. Lata Hughes  03/2020    Dr. Wilda Patel Right 08/01/2016    plate and screw in lower right leg     HEMICOLECTOMY Right 06/04/2020    ROBOTIC RIGHT COLECTOMY performed by Reed Burnham MD at [address] Right 08/23/2016    Plate inserted into rt. leg    POLYPECTOMY  06/2020    [facility] Dr. Sue Bingham

## 2023-09-07 DIAGNOSIS — G25.81 RESTLESS LEG SYNDROME: ICD-10-CM

## 2023-09-07 RX ORDER — PRAMIPEXOLE DIHYDROCHLORIDE 0.5 MG/1
TABLET ORAL
Qty: 270 TABLET | Refills: 3 | Status: SHIPPED | OUTPATIENT
Start: 2023-09-07

## 2023-09-13 ENCOUNTER — NURSE ONLY (OUTPATIENT)
Dept: LAB | Age: 83
End: 2023-09-13

## 2023-09-13 DIAGNOSIS — E03.9 ACQUIRED HYPOTHYROIDISM: ICD-10-CM

## 2023-09-13 DIAGNOSIS — E78.00 PURE HYPERCHOLESTEROLEMIA: ICD-10-CM

## 2023-09-13 LAB
ALBUMIN SERPL BCG-MCNC: 4.3 G/DL (ref 3.5–5.1)
ALP SERPL-CCNC: 89 U/L (ref 38–126)
ALT SERPL W/O P-5'-P-CCNC: 15 U/L (ref 11–66)
ANION GAP SERPL CALC-SCNC: 12 MEQ/L (ref 8–16)
AST SERPL-CCNC: 17 U/L (ref 5–40)
BASOPHILS ABSOLUTE: 0.1 THOU/MM3 (ref 0–0.1)
BASOPHILS NFR BLD AUTO: 1 %
BILIRUB SERPL-MCNC: 0.6 MG/DL (ref 0.3–1.2)
BUN SERPL-MCNC: 17 MG/DL (ref 7–22)
CALCIUM SERPL-MCNC: 9.6 MG/DL (ref 8.5–10.5)
CHLORIDE SERPL-SCNC: 101 MEQ/L (ref 98–111)
CHOLEST SERPL-MCNC: 174 MG/DL (ref 100–199)
CO2 SERPL-SCNC: 26 MEQ/L (ref 23–33)
CREAT SERPL-MCNC: 0.7 MG/DL (ref 0.4–1.2)
DEPRECATED RDW RBC AUTO: 44.6 FL (ref 35–45)
EOSINOPHIL NFR BLD AUTO: 1.4 %
EOSINOPHILS ABSOLUTE: 0.1 THOU/MM3 (ref 0–0.4)
ERYTHROCYTE [DISTWIDTH] IN BLOOD BY AUTOMATED COUNT: 13.1 % (ref 11.5–14.5)
GFR SERPL CREATININE-BSD FRML MDRD: > 60 ML/MIN/1.73M2
GLUCOSE SERPL-MCNC: 99 MG/DL (ref 70–108)
HCT VFR BLD AUTO: 41.4 % (ref 37–47)
HDLC SERPL-MCNC: 49 MG/DL
HGB BLD-MCNC: 13.3 GM/DL (ref 12–16)
IMM GRANULOCYTES # BLD AUTO: 0.01 THOU/MM3 (ref 0–0.07)
IMM GRANULOCYTES NFR BLD AUTO: 0.2 %
LDLC SERPL CALC-MCNC: 77 MG/DL
LYMPHOCYTES ABSOLUTE: 2 THOU/MM3 (ref 1–4.8)
LYMPHOCYTES NFR BLD AUTO: 40.5 %
MCH RBC QN AUTO: 29.8 PG (ref 26–33)
MCHC RBC AUTO-ENTMCNC: 32.1 GM/DL (ref 32.2–35.5)
MCV RBC AUTO: 92.6 FL (ref 81–99)
MONOCYTES ABSOLUTE: 0.3 THOU/MM3 (ref 0.4–1.3)
MONOCYTES NFR BLD AUTO: 6.7 %
NEUTROPHILS NFR BLD AUTO: 50.2 %
NRBC BLD AUTO-RTO: 0 /100 WBC
PLATELET # BLD AUTO: 208 THOU/MM3 (ref 130–400)
PMV BLD AUTO: 10.2 FL (ref 9.4–12.4)
POTASSIUM SERPL-SCNC: 4.2 MEQ/L (ref 3.5–5.2)
PROT SERPL-MCNC: 7.1 G/DL (ref 6.1–8)
RBC # BLD AUTO: 4.47 MILL/MM3 (ref 4.2–5.4)
SEGMENTED NEUTROPHILS ABSOLUTE COUNT: 2.5 THOU/MM3 (ref 1.8–7.7)
SODIUM SERPL-SCNC: 139 MEQ/L (ref 135–145)
TRIGL SERPL-MCNC: 240 MG/DL (ref 0–199)
TSH SERPL DL<=0.005 MIU/L-ACNC: 3.28 UIU/ML (ref 0.4–4.2)
WBC # BLD AUTO: 5 THOU/MM3 (ref 4.8–10.8)

## 2023-10-15 ENCOUNTER — PATIENT MESSAGE (OUTPATIENT)
Dept: FAMILY MEDICINE CLINIC | Age: 83
End: 2023-10-15

## 2023-10-15 DIAGNOSIS — J40 BRONCHITIS: ICD-10-CM

## 2023-10-16 RX ORDER — AZITHROMYCIN 250 MG/1
TABLET, FILM COATED ORAL
Qty: 1 PACKET | Refills: 0 | Status: SHIPPED | OUTPATIENT
Start: 2023-10-16

## 2023-10-16 RX ORDER — BENZONATATE 200 MG/1
200 CAPSULE ORAL 3 TIMES DAILY PRN
Qty: 30 CAPSULE | Refills: 0 | Status: SHIPPED | OUTPATIENT
Start: 2023-10-16 | End: 2023-10-23

## 2023-10-16 NOTE — TELEPHONE ENCOUNTER
From: Linda Ontiveros  To: Wolf Ludwig  Sent: 10/15/2023 6:50 PM EDT  Subject: Head discomfort, cough, chest discomfort    Have pressure discomfort in my head with drainage into my chest. It makes me cough and that hurts in my chest. Don't know if I should be concerned or if there is something I can take to help it get better. No fever.

## 2023-12-01 ENCOUNTER — OFFICE VISIT (OUTPATIENT)
Dept: NEUROLOGY | Age: 83
End: 2023-12-01

## 2023-12-01 VITALS
HEIGHT: 60 IN | WEIGHT: 181 LBS | OXYGEN SATURATION: 95 % | BODY MASS INDEX: 35.53 KG/M2 | DIASTOLIC BLOOD PRESSURE: 71 MMHG | HEART RATE: 62 BPM | SYSTOLIC BLOOD PRESSURE: 130 MMHG

## 2023-12-01 DIAGNOSIS — G25.81 RESTLESS LEG SYNDROME: ICD-10-CM

## 2023-12-01 DIAGNOSIS — G20.A1 PARKINSON'S DISEASE WITHOUT DYSKINESIA OR FLUCTUATING MANIFESTATIONS: Primary | ICD-10-CM

## 2023-12-01 DIAGNOSIS — R25.8 BRADYKINESIA: ICD-10-CM

## 2023-12-01 NOTE — PROGRESS NOTES
in the past. She has reduced right arm swing. No falls, no dysphagia, no hallucination. She is on sinemet 25/100 mg 1 tablet  two times a day and Mirapex 0.5 mg three times a day. No side effects noted. Her daughter reports she can sometimes call out in sleep and act out her dreams. 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 We recommended she start on melatonin over the counter. After a discussion with patient and her daughter we agreed on the following plan. Plan:  Change Sinemet to 25/100 mg mg three times a day, take every 5 hours while awake. Continue with Mirapex 0.5 mg three times a day. Start Melatonin over the counter, nightly  Stay active   Follow up in 6 months or sooner if needed.    Call if any questions or concerns         Total time 24 min    RISSA Wood - CNP

## 2023-12-01 NOTE — PATIENT INSTRUCTIONS
Change Sinemet to 25/100 mg mg three times a day, take every 5 hours while awake. Continue with Mirapex 0.5 mg three times a day. Stay active   Follow up in 6 months or sooner if needed.    Call if any questions or concerns

## 2023-12-04 DIAGNOSIS — R25.8 BRADYKINESIA: ICD-10-CM

## 2023-12-04 DIAGNOSIS — G25.81 RESTLESS LEG SYNDROME: ICD-10-CM

## 2023-12-04 DIAGNOSIS — G20.A1 PARKINSON DISEASE: ICD-10-CM

## 2023-12-06 ENCOUNTER — OFFICE VISIT (OUTPATIENT)
Dept: FAMILY MEDICINE CLINIC | Age: 83
End: 2023-12-06
Payer: MEDICARE

## 2023-12-06 VITALS
HEART RATE: 61 BPM | SYSTOLIC BLOOD PRESSURE: 122 MMHG | WEIGHT: 183.2 LBS | HEIGHT: 60 IN | BODY MASS INDEX: 35.97 KG/M2 | TEMPERATURE: 97 F | RESPIRATION RATE: 18 BRPM | DIASTOLIC BLOOD PRESSURE: 62 MMHG | OXYGEN SATURATION: 98 %

## 2023-12-06 DIAGNOSIS — G20.A1 PARKINSON'S DISEASE, UNSPECIFIED WHETHER DYSKINESIA PRESENT, UNSPECIFIED WHETHER MANIFESTATIONS FLUCTUATE: ICD-10-CM

## 2023-12-06 DIAGNOSIS — Z00.00 MEDICARE ANNUAL WELLNESS VISIT, SUBSEQUENT: Primary | ICD-10-CM

## 2023-12-06 DIAGNOSIS — G25.81 RLS (RESTLESS LEGS SYNDROME): ICD-10-CM

## 2023-12-06 DIAGNOSIS — E03.9 ACQUIRED HYPOTHYROIDISM: ICD-10-CM

## 2023-12-06 DIAGNOSIS — S32.9XXA CLOSED NONDISPLACED FRACTURE OF PELVIS, UNSPECIFIED PART OF PELVIS, INITIAL ENCOUNTER (HCC): ICD-10-CM

## 2023-12-06 DIAGNOSIS — E78.00 PURE HYPERCHOLESTEROLEMIA: ICD-10-CM

## 2023-12-06 PROCEDURE — 1123F ACP DISCUSS/DSCN MKR DOCD: CPT | Performed by: NURSE PRACTITIONER

## 2023-12-06 PROCEDURE — G0439 PPPS, SUBSEQ VISIT: HCPCS | Performed by: NURSE PRACTITIONER

## 2023-12-06 RX ORDER — ALENDRONATE SODIUM 70 MG/1
70 TABLET ORAL
Qty: 12 TABLET | Refills: 1 | Status: SHIPPED | OUTPATIENT
Start: 2023-12-06

## 2023-12-06 ASSESSMENT — PATIENT HEALTH QUESTIONNAIRE - PHQ9
SUM OF ALL RESPONSES TO PHQ9 QUESTIONS 1 & 2: 0
2. FEELING DOWN, DEPRESSED OR HOPELESS: 0
SUM OF ALL RESPONSES TO PHQ QUESTIONS 1-9: 0
1. LITTLE INTEREST OR PLEASURE IN DOING THINGS: 0

## 2023-12-06 ASSESSMENT — LIFESTYLE VARIABLES
HOW MANY STANDARD DRINKS CONTAINING ALCOHOL DO YOU HAVE ON A TYPICAL DAY: PATIENT DOES NOT DRINK
HOW OFTEN DO YOU HAVE A DRINK CONTAINING ALCOHOL: NEVER

## 2024-01-24 ENCOUNTER — PATIENT MESSAGE (OUTPATIENT)
Dept: FAMILY MEDICINE CLINIC | Age: 84
End: 2024-01-24

## 2024-01-24 DIAGNOSIS — G20.A1 PARKINSON DISEASE: ICD-10-CM

## 2024-01-24 DIAGNOSIS — R53.82 CHRONIC FATIGUE: ICD-10-CM

## 2024-01-24 DIAGNOSIS — E78.00 PURE HYPERCHOLESTEROLEMIA: ICD-10-CM

## 2024-01-24 DIAGNOSIS — E03.9 ACQUIRED HYPOTHYROIDISM: ICD-10-CM

## 2024-01-24 DIAGNOSIS — G25.81 RESTLESS LEG SYNDROME: ICD-10-CM

## 2024-01-24 DIAGNOSIS — R25.8 BRADYKINESIA: ICD-10-CM

## 2024-01-24 RX ORDER — ROSUVASTATIN CALCIUM 10 MG/1
10 TABLET, COATED ORAL NIGHTLY
Qty: 90 TABLET | Refills: 3 | Status: SHIPPED | OUTPATIENT
Start: 2024-01-24

## 2024-01-24 RX ORDER — PRAMIPEXOLE DIHYDROCHLORIDE 0.5 MG/1
0.5 TABLET ORAL 3 TIMES DAILY
Qty: 270 TABLET | Refills: 3 | Status: SHIPPED | OUTPATIENT
Start: 2024-01-24

## 2024-01-24 RX ORDER — LEVOTHYROXINE SODIUM 0.05 MG/1
TABLET ORAL
Qty: 90 TABLET | Refills: 3 | Status: SHIPPED | OUTPATIENT
Start: 2024-01-24

## 2024-01-24 RX ORDER — NYSTATIN 100000 U/G
CREAM TOPICAL
Qty: 30 G | Refills: 1 | Status: SHIPPED | OUTPATIENT
Start: 2024-01-24

## 2024-01-24 RX ORDER — ALENDRONATE SODIUM 70 MG/1
70 TABLET ORAL
Qty: 12 TABLET | Refills: 3 | Status: SHIPPED | OUTPATIENT
Start: 2024-01-24

## 2024-01-24 NOTE — TELEPHONE ENCOUNTER
From: Kaylyn Hdz  To: Wolf Ludwig  Sent: 1/24/2024 11:48 AM EST  Subject: New mail order prescription service    Blair and I now have [facility] as our mail order prescription service which is replacing our [facility] scripts. My daughter will drop off copies of the new cards to your office on or before Friday January 26. Once you receive the new cards, can you please send new prescriptions to [facility] for all of our medications including the freestyle lilian sensor? Please let me know if you need any further information. Thank you.

## 2024-01-24 NOTE — TELEPHONE ENCOUNTER
Patient has a new pharmacy and needs all the scripts sent there. Please clarify the directions to the Lopressor.

## 2024-03-03 ENCOUNTER — PATIENT MESSAGE (OUTPATIENT)
Dept: FAMILY MEDICINE CLINIC | Age: 84
End: 2024-03-03

## 2024-03-04 RX ORDER — BENZONATATE 200 MG/1
200 CAPSULE ORAL 3 TIMES DAILY PRN
Qty: 30 CAPSULE | Refills: 0 | Status: SHIPPED | OUTPATIENT
Start: 2024-03-04 | End: 2024-03-11

## 2024-03-04 RX ORDER — AZITHROMYCIN 250 MG/1
TABLET, FILM COATED ORAL
Qty: 1 PACKET | Refills: 0 | Status: SHIPPED | OUTPATIENT
Start: 2024-03-04

## 2024-03-04 NOTE — TELEPHONE ENCOUNTER
From: Kaylyn Hdz  To: Wolf Ludwig  Sent: 3/3/2024 9:20 AM EST  Subject: Blair's blood work and release from [facility]    Blair will be released from [facility] on Monday, March 4 to his home. He and I have appointments with you on March 6 starting at 1:45.  From last appointment an order for blood work was given to Blair and that has not been completed and was wondering if it would need to be done since blood work was done while in [facility] 3 weeks ago. Thinking you would have access to that information. Planning to bring Blair for the appointment. Too early to know if he will get along at home. I cannot take him and Jada works Monday and Tuesday. She will bring us for appointment on Wednesday.

## 2024-03-20 ENCOUNTER — OFFICE VISIT (OUTPATIENT)
Dept: FAMILY MEDICINE CLINIC | Age: 84
End: 2024-03-20

## 2024-03-20 VITALS
SYSTOLIC BLOOD PRESSURE: 108 MMHG | BODY MASS INDEX: 35.93 KG/M2 | RESPIRATION RATE: 16 BRPM | DIASTOLIC BLOOD PRESSURE: 54 MMHG | OXYGEN SATURATION: 96 % | HEART RATE: 67 BPM | WEIGHT: 183 LBS | TEMPERATURE: 98.1 F | HEIGHT: 60 IN

## 2024-03-20 DIAGNOSIS — E03.9 ACQUIRED HYPOTHYROIDISM: ICD-10-CM

## 2024-03-20 DIAGNOSIS — E66.01 SEVERE OBESITY (BMI 35.0-39.9) WITH COMORBIDITY (HCC): ICD-10-CM

## 2024-03-20 DIAGNOSIS — G25.81 RESTLESS LEG SYNDROME: ICD-10-CM

## 2024-03-20 DIAGNOSIS — J18.9 PNEUMONIA DUE TO INFECTIOUS ORGANISM, UNSPECIFIED LATERALITY, UNSPECIFIED PART OF LUNG: ICD-10-CM

## 2024-03-20 DIAGNOSIS — E78.00 PURE HYPERCHOLESTEROLEMIA: ICD-10-CM

## 2024-03-20 DIAGNOSIS — Z00.00 MEDICARE ANNUAL WELLNESS VISIT, SUBSEQUENT: Primary | ICD-10-CM

## 2024-03-20 DIAGNOSIS — J10.1 INFLUENZA A: ICD-10-CM

## 2024-03-20 DIAGNOSIS — G20.A1 PARKINSON'S DISEASE WITHOUT FLUCTUATING MANIFESTATIONS, UNSPECIFIED WHETHER DYSKINESIA PRESENT: ICD-10-CM

## 2024-03-20 ASSESSMENT — PATIENT HEALTH QUESTIONNAIRE - PHQ9
SUM OF ALL RESPONSES TO PHQ QUESTIONS 1-9: 0
SUM OF ALL RESPONSES TO PHQ9 QUESTIONS 1 & 2: 0
1. LITTLE INTEREST OR PLEASURE IN DOING THINGS: NOT AT ALL
SUM OF ALL RESPONSES TO PHQ QUESTIONS 1-9: 0
SUM OF ALL RESPONSES TO PHQ QUESTIONS 1-9: 0
2. FEELING DOWN, DEPRESSED OR HOPELESS: NOT AT ALL
SUM OF ALL RESPONSES TO PHQ QUESTIONS 1-9: 0

## 2024-03-20 NOTE — PROGRESS NOTES
Medicare Annual Wellness Visit    Kaylyn Hdz is here for Medicare AWV    Assessment & Plan   Medicare annual wellness visit, subsequent  Parkinson's disease without fluctuating manifestations, unspecified whether dyskinesia present  Severe obesity (BMI 35.0-39.9) with comorbidity (HCC)  Pure hypercholesterolemia  Restless leg syndrome  Acquired hypothyroidism  Influenza A  Pneumonia due to infectious organism, unspecified laterality, unspecified part of lung    Recommendations for Preventive Services Due: see orders and patient instructions/AVS.  Recommended screening schedule for the next 5-10 years is provided to the patient in written form: see Patient Instructions/AVS.     No follow-ups on file.     Subjective   The following acute and/or chronic problems were also addressed today:  Patient is here today for her Medicare exam we also touched base on her recent health problems which include worsening of her Parkinson's disease she also had influenza A with complicated of pneumonia which was hospitalized recently discharged also her  has been ill with having another stroke he is now back home and receiving home health.  With all the complications have been having they are looking into getting into assisted living at [facility].    Patient's complete Health Risk Assessment and screening values have been reviewed and are found in Flowsheets. The following problems were reviewed today and where indicated follow up appointments were made and/or referrals ordered.    Positive Risk Factor Screenings with Interventions:       Cognitive:   Clock Drawing Test (CDT): (!) Abnormal  Words recalled: 0 Words Recalled  Total Score: (!) 0  Total Score Interpretation: Abnormal Mini-Cog  Interventions:  Cognitively she is slowly going downhill she does get confused at times particularly when there are lots of factors going on            Activity, Diet, and Weight:  On average, how many days per week do you engage in

## 2024-06-14 ENCOUNTER — OFFICE VISIT (OUTPATIENT)
Dept: NEUROLOGY | Age: 84
End: 2024-06-14

## 2024-06-14 VITALS
DIASTOLIC BLOOD PRESSURE: 64 MMHG | HEART RATE: 67 BPM | WEIGHT: 182 LBS | SYSTOLIC BLOOD PRESSURE: 139 MMHG | OXYGEN SATURATION: 95 % | BODY MASS INDEX: 35.73 KG/M2 | HEIGHT: 60 IN

## 2024-06-14 DIAGNOSIS — G20.A1 PARKINSON'S DISEASE WITHOUT DYSKINESIA OR FLUCTUATING MANIFESTATIONS (HCC): Primary | ICD-10-CM

## 2024-06-14 DIAGNOSIS — G25.81 RESTLESS LEG SYNDROME: ICD-10-CM

## 2024-06-14 DIAGNOSIS — R25.8 BRADYKINESIA: ICD-10-CM

## 2024-06-14 NOTE — PATIENT INSTRUCTIONS
Change Sinemet to 25/100 mg 1.5 tablets three times a day, take every 5 hours while awake, take at 8am, 1pm, and 6pm.  Continue with Mirapex 0.5 mg three times a day.   Start Melatonin over the counter, nightly  Continue following with physical therapy [facility] program  Stay active   Follow up in 6 months or sooner if needed.   Call if any questions or concerns

## 2024-06-14 NOTE — PROGRESS NOTES
sinemet 25/100 mg 1 tablet three times a day and Mirapex 0.5 mg three times a day. No side effects noted. She did feel benefit to the increase in Sinemet, she feels she needs more pep. No falls, no hallucination, no dysphagia. Her daughter reports she can sometimes call out in sleep and act out her dreams. 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 We recommended she start on melatonin over the counter.  After a discussion with patient and her daughter we agreed on the following plan.       Plan:  Change Sinemet to 25/100 mg 1.5 tablets three times a day, take every 5 hours while awake, take at 8am, 1pm, and 6pm.  Continue with Mirapex 0.5 mg three times a day.   Start Melatonin over the counter, nightly  Continue following with physical therapy [facility] program  Stay active   Follow up in 6 months or sooner if needed.   Call if any questions or concerns          Total time 32 min    Paige L Leopold, APRN - CNP

## 2024-06-18 SDOH — ECONOMIC STABILITY: FOOD INSECURITY: WITHIN THE PAST 12 MONTHS, YOU WORRIED THAT YOUR FOOD WOULD RUN OUT BEFORE YOU GOT MONEY TO BUY MORE.: NEVER TRUE

## 2024-06-18 SDOH — ECONOMIC STABILITY: TRANSPORTATION INSECURITY
IN THE PAST 12 MONTHS, HAS LACK OF TRANSPORTATION KEPT YOU FROM MEETINGS, WORK, OR FROM GETTING THINGS NEEDED FOR DAILY LIVING?: NO

## 2024-06-18 SDOH — ECONOMIC STABILITY: INCOME INSECURITY: HOW HARD IS IT FOR YOU TO PAY FOR THE VERY BASICS LIKE FOOD, HOUSING, MEDICAL CARE, AND HEATING?: NOT VERY HARD

## 2024-06-18 SDOH — ECONOMIC STABILITY: FOOD INSECURITY: WITHIN THE PAST 12 MONTHS, THE FOOD YOU BOUGHT JUST DIDN'T LAST AND YOU DIDN'T HAVE MONEY TO GET MORE.: NEVER TRUE

## 2024-06-19 ENCOUNTER — OFFICE VISIT (OUTPATIENT)
Dept: FAMILY MEDICINE CLINIC | Age: 84
End: 2024-06-19
Payer: MEDICARE

## 2024-06-19 VITALS
OXYGEN SATURATION: 94 % | TEMPERATURE: 97.8 F | WEIGHT: 183 LBS | BODY MASS INDEX: 35.93 KG/M2 | SYSTOLIC BLOOD PRESSURE: 132 MMHG | DIASTOLIC BLOOD PRESSURE: 66 MMHG | HEART RATE: 72 BPM | HEIGHT: 60 IN | RESPIRATION RATE: 20 BRPM

## 2024-06-19 DIAGNOSIS — E03.9 ACQUIRED HYPOTHYROIDISM: ICD-10-CM

## 2024-06-19 DIAGNOSIS — G20.A1 PARKINSON'S DISEASE WITHOUT FLUCTUATING MANIFESTATIONS, UNSPECIFIED WHETHER DYSKINESIA PRESENT (HCC): Primary | ICD-10-CM

## 2024-06-19 DIAGNOSIS — E78.00 PURE HYPERCHOLESTEROLEMIA: ICD-10-CM

## 2024-06-19 PROCEDURE — 99214 OFFICE O/P EST MOD 30 MIN: CPT | Performed by: NURSE PRACTITIONER

## 2024-06-19 PROCEDURE — 1123F ACP DISCUSS/DSCN MKR DOCD: CPT | Performed by: NURSE PRACTITIONER

## 2024-06-19 RX ORDER — LANOLIN ALCOHOL/MO/W.PET/CERES
1 CREAM (GRAM) TOPICAL DAILY
COMMUNITY

## 2024-06-19 ASSESSMENT — ENCOUNTER SYMPTOMS
RESPIRATORY NEGATIVE: 1
GASTROINTESTINAL NEGATIVE: 1
EYES NEGATIVE: 1

## 2024-06-19 NOTE — PROGRESS NOTES
Kaylyn Hdz is a 83 y.o. female whopresents today for :  Chief Complaint   Patient presents with    3 Month Follow-Up     Vitals:    06/19/24 1410   BP: 132/66   Pulse: 72   Resp: 20   Temp: 97.8 °F (36.6 °C)   SpO2: 94%       HPI:     HPI patient here for follow-up.  She did recently see neurology and medications were changed.  She is in PT.  Her only complaint today is her left ear feels plugged with wax.  Otherwise she feels she is stable.    Patient Active Problem List   Diagnosis    Pure hypercholesterolemia    Vitamin D deficiency    Hypothyroidism    Osteopenia    Parkinson disease (HCC)    Restless leg syndrome    Personal history of DVT (deep vein thrombosis)    Acute diverticulitis    Left lower quadrant pain    Hepatic steatosis    Tubular adenoma    S/P partial resection of colon     Past Medical History:   Diagnosis Date    A-fib (HCC)     Diverticulosis 04/2018    Fatty liver     GERD (gastroesophageal reflux disease)     was on prilosec in past    Hiatal hernia     Hx of blood clots     Hyperlipidemia     Hypertension     Hypothyroidism     Incontinence     urinary    Osteoarthritis     Osteoporosis     Parkinson's disease (HCC)     PONV (postoperative nausea and vomiting)     Prolonged emergence from general anesthesia     Stress fracture     left distal radius    Tubular adenoma     colononoscopy 2010- Dr. Gilbert; large sigmoid polyp colonoscopy 2016    Vertigo 06/2018      Past Surgical History:   Procedure Laterality Date    BLADDER SUSPENSION      CATARACT REMOVAL  05/23/2015    CHOLECYSTECTOMY      COLONOSCOPY  2010, june 2016    Dr. Gilbert- polyps removed, Dr. Mares    COLONOSCOPY  03/2020    Dr. Mares    DILATION AND CURETTAGE OF UTERUS      FRACTURE SURGERY Right 08/01/2016    plate and screw in lower right leg     HEMICOLECTOMY Right 06/04/2020    ROBOTIC RIGHT COLECTOMY performed by Randy Carroll MD at [facility] OR    OTHER SURGICAL HISTORY Right 08/23/2016    Plate inserted into

## 2024-08-19 RX ORDER — NYSTATIN 100000 U/G
CREAM TOPICAL
Qty: 30 G | Refills: 1 | Status: SHIPPED | OUTPATIENT
Start: 2024-08-19

## 2024-09-17 DIAGNOSIS — E78.00 PURE HYPERCHOLESTEROLEMIA: ICD-10-CM

## 2024-09-17 DIAGNOSIS — E03.9 ACQUIRED HYPOTHYROIDISM: ICD-10-CM

## 2024-09-18 ENCOUNTER — OFFICE VISIT (OUTPATIENT)
Dept: FAMILY MEDICINE CLINIC | Age: 84
End: 2024-09-18

## 2024-09-18 VITALS
HEART RATE: 68 BPM | HEIGHT: 60 IN | BODY MASS INDEX: 35.42 KG/M2 | SYSTOLIC BLOOD PRESSURE: 132 MMHG | OXYGEN SATURATION: 94 % | WEIGHT: 180.4 LBS | TEMPERATURE: 97.5 F | RESPIRATION RATE: 16 BRPM | DIASTOLIC BLOOD PRESSURE: 64 MMHG

## 2024-09-18 DIAGNOSIS — E03.9 ACQUIRED HYPOTHYROIDISM: ICD-10-CM

## 2024-09-18 DIAGNOSIS — G20.A1 PARKINSON'S DISEASE WITHOUT FLUCTUATING MANIFESTATIONS, UNSPECIFIED WHETHER DYSKINESIA PRESENT (HCC): Primary | ICD-10-CM

## 2024-09-18 DIAGNOSIS — R25.8 BRADYKINESIA: ICD-10-CM

## 2024-09-18 DIAGNOSIS — E78.00 PURE HYPERCHOLESTEROLEMIA: ICD-10-CM

## 2024-09-18 ASSESSMENT — ENCOUNTER SYMPTOMS
RESPIRATORY NEGATIVE: 1
GASTROINTESTINAL NEGATIVE: 1

## 2024-09-30 RX ORDER — NYSTATIN 100000 U/G
CREAM TOPICAL
Qty: 30 G | Refills: 1 | Status: SHIPPED | OUTPATIENT
Start: 2024-09-30

## 2024-09-30 NOTE — TELEPHONE ENCOUNTER
Last visit- 9/18/2024  Next visit- 12/18/2024    Requested Prescriptions     Pending Prescriptions Disp Refills    nystatin (MYCOSTATIN) 833393 UNIT/GM cream [Pharmacy Med Name: NYSTATIN CRE [id number]] 30 g 1     Sig: APPLY TOPICALLY TWO TIMES ADAY

## 2024-11-19 RX ORDER — NYSTATIN 100000 U/G
CREAM TOPICAL
Qty: 30 G | Refills: 1 | Status: SHIPPED | OUTPATIENT
Start: 2024-11-19

## 2024-11-19 NOTE — TELEPHONE ENCOUNTER
Last visit- 9/18/2024  Next visit- 12/18/2024    Requested Prescriptions     Pending Prescriptions Disp Refills    nystatin (MYCOSTATIN) [id number] UNIT/GM cream 30 g 1     Sig: APPLY TOPICALLY TWO TIMES ADAY

## 2024-12-03 RX ORDER — CARBIDOPA AND LEVODOPA 25; 100 MG/1; MG/1
TABLET ORAL
Qty: 405 TABLET | Refills: 1 | Status: SHIPPED | OUTPATIENT
Start: 2024-12-03

## 2024-12-13 ENCOUNTER — OFFICE VISIT (OUTPATIENT)
Dept: NEUROLOGY | Age: 84
End: 2024-12-13

## 2024-12-13 VITALS
DIASTOLIC BLOOD PRESSURE: 70 MMHG | WEIGHT: 184 LBS | HEIGHT: 60 IN | SYSTOLIC BLOOD PRESSURE: 128 MMHG | HEART RATE: 65 BPM | BODY MASS INDEX: 36.12 KG/M2 | OXYGEN SATURATION: 95 %

## 2024-12-13 DIAGNOSIS — R25.8 BRADYKINESIA: ICD-10-CM

## 2024-12-13 DIAGNOSIS — G20.A1 PARKINSON'S DISEASE WITHOUT DYSKINESIA OR FLUCTUATING MANIFESTATIONS (HCC): Primary | ICD-10-CM

## 2024-12-13 DIAGNOSIS — G25.81 RESTLESS LEG SYNDROME: ICD-10-CM

## 2024-12-13 RX ORDER — PRAMIPEXOLE DIHYDROCHLORIDE 0.5 MG/1
0.5 TABLET ORAL 3 TIMES DAILY
Qty: 270 TABLET | Refills: 3 | Status: SHIPPED | OUTPATIENT
Start: 2024-12-13

## 2024-12-13 NOTE — PROGRESS NOTES
NEUROLOGY OUT PATIENT FOLLOW UP NOTE:  12/13/20241:23 PM    Kaylyn Hdz is here for follow up for restless leg, parkinson's disease.          Allergies   Allergen Reactions    Penicillins      Unsure of reaction, childhood    Bactrim [Sulfamethoxazole-Trimethoprim]     Lipitor [Atorvastatin] Other (See Comments)     Muscle aches       Current Outpatient Medications:     carbidopa-levodopa (SINEMET)  MG per tablet, TAKE 1 AND 1/2 TABLETS 3   TIMES DAILY., Disp: 405 tablet, Rfl: 1    nystatin (MYCOSTATIN) [id number] UNIT/GM cream, APPLY TOPICALLY TWO TIMES ADAY, Disp: 30 g, Rfl: 1    melatonin 3 MG TABS tablet, Take 1 mg by mouth daily, Disp: , Rfl:     alendronate (FOSAMAX) 70 MG tablet, Take 1 tablet by mouth every 7 days, Disp: 12 tablet, Rfl: 3    apixaban (ELIQUIS) 5 MG TABS tablet, Apixaban 5 mg, 1 tablet 2 times daily, Disp: 180 tablet, Rfl: 3    Cholecalciferol (VITAMIN D3) 125 MCG (5000 UT) TABS, Take 1 tablet by mouth daily, Disp: 90 tablet, Rfl: 3    levothyroxine (SYNTHROID) 50 MCG tablet, TAKE 1 TABLET DAILY FOR LOW THYROID, Disp: 90 tablet, Rfl: 3    Omega-3 300 MG CAPS, Take 2 capsules by mouth daily, Disp: 180 capsule, Rfl: 1    pramipexole (MIRAPEX) 0.5 MG tablet, Take 1 tablet by mouth 3 times daily, Disp: 270 tablet, Rfl: 3    rosuvastatin (CRESTOR) 10 MG tablet, Take 1 tablet by mouth nightly, Disp: 90 tablet, Rfl: 3    CALCIUM PO, Take 8 tablets by mouth daily Pt takes 1400 mg daily, Disp: , Rfl:     NONFORMULARY, Respire Pink [employer]liance, Disp: , Rfl:     ACETAMINOPHEN PO, Take 1,000 mg by mouth daily as needed (pain) , Disp: , Rfl:     I reviewed the past medical history, allergies, medications, social history and family history.       PE:   Vitals:    12/13/24 1323   BP: 128/70   Site: Left Upper Arm   Position: Sitting   Cuff Size: Medium Adult   Pulse: 65   SpO2: 95%   Weight: 83.5 kg (184 lb)   Height: 1.524 m (5')       General Appearance:  awake, alert, oriented   Gen: NAD,

## 2024-12-13 NOTE — PATIENT INSTRUCTIONS
Continue Sinemet 25/100 mg 1.5 tablets three times a day, take every 5 hours while awake, take at 8am, 1pm, and 6pm.  Continue with Mirapex 0.5 mg three times a day.   Melatonin over the counter, nightly  Continue following with recommendations from physical therapy [facility] program  Stay active   Follow up in 6 months or sooner if needed.   Call if any questions or concerns

## 2024-12-18 ENCOUNTER — OFFICE VISIT (OUTPATIENT)
Dept: FAMILY MEDICINE CLINIC | Age: 84
End: 2024-12-18

## 2024-12-18 VITALS
RESPIRATION RATE: 20 BRPM | SYSTOLIC BLOOD PRESSURE: 122 MMHG | TEMPERATURE: 97 F | OXYGEN SATURATION: 94 % | BODY MASS INDEX: 34.57 KG/M2 | WEIGHT: 177 LBS | HEART RATE: 61 BPM | DIASTOLIC BLOOD PRESSURE: 72 MMHG

## 2024-12-18 DIAGNOSIS — R25.8 BRADYKINESIA: ICD-10-CM

## 2024-12-18 DIAGNOSIS — E03.9 ACQUIRED HYPOTHYROIDISM: ICD-10-CM

## 2024-12-18 DIAGNOSIS — E78.00 PURE HYPERCHOLESTEROLEMIA: ICD-10-CM

## 2024-12-18 DIAGNOSIS — G20.A1 PARKINSON'S DISEASE WITHOUT FLUCTUATING MANIFESTATIONS, UNSPECIFIED WHETHER DYSKINESIA PRESENT (HCC): Primary | ICD-10-CM

## 2024-12-18 ASSESSMENT — ENCOUNTER SYMPTOMS
EYES NEGATIVE: 1
GASTROINTESTINAL NEGATIVE: 1
RESPIRATORY NEGATIVE: 1

## 2024-12-18 NOTE — PROGRESS NOTES
Kaylyn Hdz is a 84 y.o. female who presents today for :  Chief Complaint   Patient presents with    3 Month Follow-Up     parkinsons     Vitals:    12/18/24 1423   BP: 122/72   Pulse: 61   Resp: 20   Temp: 97 °F (36.1 °C)   SpO2: 94%       HPI:   Patient here for follow-up she is living at assisted living at [facility] at this time overall she is stable her medications and not change she is undergoing physical therapy for Parkinson's disease and her overall function is good at this time we did review her recent labs which were all doing well        Patient Active Problem List   Diagnosis    Pure hypercholesterolemia    Vitamin D deficiency    Hypothyroidism    Osteopenia    Parkinson disease (HCC)    Restless leg syndrome    Personal history of DVT (deep vein thrombosis)    Acute diverticulitis    Left lower quadrant pain    Hepatic steatosis    Tubular adenoma    S/P partial resection of colon     Past Medical History:   Diagnosis Date    A-fib (HCC)     Diverticulosis 04/2018    Fatty liver     GERD (gastroesophageal reflux disease)     was on prilosec in past    Hiatal hernia     Hx of blood clots     Hyperlipidemia     Hypertension     Hypothyroidism     Incontinence     urinary    Osteoarthritis     Osteoporosis     Parkinson's disease (HCC)     PONV (postoperative nausea and vomiting)     Prolonged emergence from general anesthesia     Stress fracture     left distal radius    Tubular adenoma     colononoscopy 2010- Dr. Gilbert; large sigmoid polyp colonoscopy 2016    Vertigo 06/2018      Past Surgical History:   Procedure Laterality Date    BLADDER SUSPENSION      CATARACT REMOVAL  05/23/2015    CHOLECYSTECTOMY      COLONOSCOPY  2010, june 2016    Dr. Gilbert- polyps removed, Dr. Mares    COLONOSCOPY  03/2020    Dr. Mares    DILATION AND CURETTAGE OF UTERUS      FRACTURE SURGERY Right 08/01/2016    plate and screw in lower right leg     HEMICOLECTOMY Right 06/04/2020    ROBOTIC RIGHT COLECTOMY

## 2025-01-06 DIAGNOSIS — E78.00 PURE HYPERCHOLESTEROLEMIA: ICD-10-CM

## 2025-01-06 DIAGNOSIS — R53.82 CHRONIC FATIGUE: ICD-10-CM

## 2025-01-06 DIAGNOSIS — E03.9 ACQUIRED HYPOTHYROIDISM: ICD-10-CM

## 2025-01-06 RX ORDER — ROSUVASTATIN CALCIUM 10 MG/1
10 TABLET, COATED ORAL NIGHTLY
Qty: 90 TABLET | Refills: 1 | Status: SHIPPED | OUTPATIENT
Start: 2025-01-06

## 2025-01-06 RX ORDER — LEVOTHYROXINE SODIUM 50 UG/1
TABLET ORAL
Qty: 90 TABLET | Refills: 1 | Status: SHIPPED | OUTPATIENT
Start: 2025-01-06

## 2025-01-20 RX ORDER — ALENDRONATE SODIUM 70 MG/1
70 TABLET ORAL
Qty: 12 TABLET | Refills: 3 | Status: SHIPPED | OUTPATIENT
Start: 2025-01-20

## 2025-01-20 NOTE — TELEPHONE ENCOUNTER
Last visit- 12/18/2024  Next visit- 4/28/2025    Requested Prescriptions     Pending Prescriptions Disp Refills    alendronate (FOSAMAX) 70 MG tablet [Pharmacy Med Name: ALENDRONATE  TAB 70MG] 12 tablet 3     Sig: TAKE 1 TABLET EVERY 7 DAYS

## 2025-02-07 ENCOUNTER — TELEPHONE (OUTPATIENT)
Dept: FAMILY MEDICINE CLINIC | Age: 85
End: 2025-02-07

## 2025-02-07 NOTE — TELEPHONE ENCOUNTER
Pt is now at [facility] in [place] and Norovirus is going around there     She started early this am with vomiting and now has diarrhea and nausea    Denies fever, cough, kimberly, body aches and HA    Pt is not taking any meds for illness.  She is asking if there is anything you recommend    [facility] [address]

## 2025-04-07 RX ORDER — NYSTATIN 100000 U/G
CREAM TOPICAL
Qty: 30 G | Refills: 1 | Status: SHIPPED | OUTPATIENT
Start: 2025-04-07

## 2025-04-07 NOTE — TELEPHONE ENCOUNTER
Last visit- 12/18/2024  Next visit- 4/28/2025    Requested Prescriptions     Pending Prescriptions Disp Refills    nystatin (MYCOSTATIN) [id number] UNIT/GM cream [Pharmacy Med Name: NYSTATIN CRE [id number]] 30 g 1     Sig: APPLY TOPICALLY TWO TIMES ADAY

## 2025-04-27 SDOH — ECONOMIC STABILITY: INCOME INSECURITY: IN THE LAST 12 MONTHS, WAS THERE A TIME WHEN YOU WERE NOT ABLE TO PAY THE MORTGAGE OR RENT ON TIME?: NO

## 2025-04-27 SDOH — ECONOMIC STABILITY: FOOD INSECURITY: WITHIN THE PAST 12 MONTHS, YOU WORRIED THAT YOUR FOOD WOULD RUN OUT BEFORE YOU GOT MONEY TO BUY MORE.: NEVER TRUE

## 2025-04-27 SDOH — ECONOMIC STABILITY: FOOD INSECURITY: WITHIN THE PAST 12 MONTHS, THE FOOD YOU BOUGHT JUST DIDN'T LAST AND YOU DIDN'T HAVE MONEY TO GET MORE.: NEVER TRUE

## 2025-04-27 SDOH — ECONOMIC STABILITY: TRANSPORTATION INSECURITY
IN THE PAST 12 MONTHS, HAS THE LACK OF TRANSPORTATION KEPT YOU FROM MEDICAL APPOINTMENTS OR FROM GETTING MEDICATIONS?: NO

## 2025-04-27 SDOH — HEALTH STABILITY: PHYSICAL HEALTH: ON AVERAGE, HOW MANY DAYS PER WEEK DO YOU ENGAGE IN MODERATE TO STRENUOUS EXERCISE (LIKE A BRISK WALK)?: 3 DAYS

## 2025-04-27 SDOH — HEALTH STABILITY: PHYSICAL HEALTH: ON AVERAGE, HOW MANY MINUTES DO YOU ENGAGE IN EXERCISE AT THIS LEVEL?: 30 MIN

## 2025-04-27 ASSESSMENT — LIFESTYLE VARIABLES
HOW OFTEN DO YOU HAVE SIX OR MORE DRINKS ON ONE OCCASION: 1
HOW OFTEN DO YOU HAVE A DRINK CONTAINING ALCOHOL: 1
HOW MANY STANDARD DRINKS CONTAINING ALCOHOL DO YOU HAVE ON A TYPICAL DAY: PATIENT DOES NOT DRINK
HOW MANY STANDARD DRINKS CONTAINING ALCOHOL DO YOU HAVE ON A TYPICAL DAY: 0
HOW OFTEN DO YOU HAVE A DRINK CONTAINING ALCOHOL: NEVER

## 2025-04-27 ASSESSMENT — PATIENT HEALTH QUESTIONNAIRE - PHQ9
1. LITTLE INTEREST OR PLEASURE IN DOING THINGS: NOT AT ALL
SUM OF ALL RESPONSES TO PHQ QUESTIONS 1-9: 0
2. FEELING DOWN, DEPRESSED OR HOPELESS: NOT AT ALL

## 2025-04-28 ENCOUNTER — HOSPITAL ENCOUNTER (OUTPATIENT)
Age: 85
Discharge: HOME | End: 2025-04-28
Payer: MEDICARE

## 2025-04-28 ENCOUNTER — RESULTS FOLLOW-UP (OUTPATIENT)
Dept: FAMILY MEDICINE CLINIC | Age: 85
End: 2025-04-28

## 2025-04-28 ENCOUNTER — OFFICE VISIT (OUTPATIENT)
Dept: FAMILY MEDICINE CLINIC | Age: 85
End: 2025-04-28

## 2025-04-28 ENCOUNTER — HOSPITAL ENCOUNTER (OUTPATIENT)
Dept: GENERAL RADIOLOGY | Age: 85
Discharge: HOME | End: 2025-04-28
Payer: MEDICARE

## 2025-04-28 VITALS
HEART RATE: 67 BPM | OXYGEN SATURATION: 94 % | DIASTOLIC BLOOD PRESSURE: 62 MMHG | HEIGHT: 60 IN | SYSTOLIC BLOOD PRESSURE: 132 MMHG | TEMPERATURE: 97.4 F | RESPIRATION RATE: 18 BRPM | WEIGHT: 175 LBS | BODY MASS INDEX: 34.36 KG/M2

## 2025-04-28 DIAGNOSIS — Z00.00 MEDICARE ANNUAL WELLNESS VISIT, SUBSEQUENT: Primary | ICD-10-CM

## 2025-04-28 DIAGNOSIS — M13.861 OTHER SPECIFIED ARTHRITIS, RIGHT KNEE: ICD-10-CM

## 2025-04-28 DIAGNOSIS — E78.00 PURE HYPERCHOLESTEROLEMIA: ICD-10-CM

## 2025-04-28 DIAGNOSIS — R25.8 BRADYKINESIA: ICD-10-CM

## 2025-04-28 DIAGNOSIS — E03.9 ACQUIRED HYPOTHYROIDISM: ICD-10-CM

## 2025-04-28 DIAGNOSIS — G20.A1 PARKINSON'S DISEASE WITHOUT FLUCTUATING MANIFESTATIONS, UNSPECIFIED WHETHER DYSKINESIA PRESENT (HCC): ICD-10-CM

## 2025-04-28 PROCEDURE — 73564 X-RAY EXAM KNEE 4 OR MORE: CPT

## 2025-04-28 NOTE — PROGRESS NOTES
Medicare Annual Wellness Visit    Kaylyn Hdz is here for Medicare AWV    Assessment & Plan   Medicare annual wellness visit, subsequent  Pure hypercholesterolemia  -     CBC with Auto Differential; Future  -     Comprehensive Metabolic Panel; Future  -     Lipid Panel; Future  Parkinson's disease without fluctuating manifestations, unspecified whether dyskinesia present (HCC)  Acquired hypothyroidism  -     TSH reflex to FT4; Future  Bradykinesia  Other specified arthritis, right knee  -     XR KNEE RIGHT (MIN 4 VIEWS); Future       No follow-ups on file.     Subjective   The following acute and/or chronic problems were also addressed today:  Reviewed pt chronic health issues.  Biggest complaint is right leg pain.  Pain in knee and down to foot  numb down to foot       Patient's complete Health Risk Assessment and screening values have been reviewed and are found in Flowsheets. The following problems were reviewed today and where indicated follow up appointments were made and/or referrals ordered.    Positive Risk Factor Screenings with Interventions:    Fall Risk:  Do you feel unsteady or are you worried about falling? : (!) (Patient-Rptd) yes  2 or more falls in past year?: (Patient-Rptd) no  Fall with injury in past year?: (Patient-Rptd) no     Interventions:    Reviewed medications, home hazards, visual acuity, and co-morbidities that can increase risk for falls  Pt having progressive right knee pain            General HRA Questions:  Select all that apply: (!) (Patient-Rptd) New or Increased Fatigue  Interventions Fatigue:  See above        Abnormal BMI (obese):  Body mass index is 34.18 kg/m². (!) Abnormal  Interventions:  Patient declines any further evaluation or treatment                           Objective   Vitals:    04/28/25 1332   BP: 132/62   BP Site: Right Upper Arm   Patient Position: Sitting   BP Cuff Size: Medium Adult   Pulse: 67   Resp: 18   Temp: 97.4 °F (36.3 °C)   TempSrc: Temporal

## 2025-04-29 LAB
ALBUMIN: 3.7 G/DL
ALP BLD-CCNC: 44 U/L
ALT SERPL-CCNC: 8 U/L
ANION GAP SERPL CALCULATED.3IONS-SCNC: NORMAL MMOL/L
AST SERPL-CCNC: 32 U/L
BASOPHILS ABSOLUTE: 0 /ΜL
BASOPHILS RELATIVE PERCENT: 0.6 %
BILIRUB SERPL-MCNC: 0.8 MG/DL (ref 0.1–1.4)
BUN BLDV-MCNC: 22 MG/DL
CALCIUM SERPL-MCNC: 9.3 MG/DL
CHLORIDE BLD-SCNC: 102 MMOL/L
CHOLESTEROL, TOTAL: 137 MG/DL
CHOLESTEROL/HDL RATIO: NORMAL
CO2: 27 MMOL/L
CREAT SERPL-MCNC: 0.8 MG/DL
EOSINOPHILS ABSOLUTE: 0.1 /ΜL
EOSINOPHILS RELATIVE PERCENT: 2.2 %
GFR, ESTIMATED: 60
GLUCOSE BLD-MCNC: 89 MG/DL
HCT VFR BLD CALC: 40.1 % (ref 36–46)
HDLC SERPL-MCNC: 50 MG/DL (ref 35–70)
HEMOGLOBIN: 13.8 G/DL (ref 12–16)
LDL CHOLESTEROL: 60
LYMPHOCYTES ABSOLUTE: 1.7 /ΜL
LYMPHOCYTES RELATIVE PERCENT: 35.7 %
MCH RBC QN AUTO: 31.3 PG
MCHC RBC AUTO-ENTMCNC: 34.4 G/DL
MCV RBC AUTO: 90.8 FL
MONOCYTES ABSOLUTE: 0.3 /ΜL
MONOCYTES RELATIVE PERCENT: 6.4 %
NEUTROPHILS ABSOLUTE: 2.7 /ΜL
NEUTROPHILS RELATIVE PERCENT: 55.1 %
NONHDLC SERPL-MCNC: NORMAL MG/DL
PLATELET # BLD: 193 K/ΜL
PMV BLD AUTO: 8.3 FL
POTASSIUM SERPL-SCNC: 4.3 MMOL/L
RBC # BLD: 4.41 10^6/ΜL
SODIUM BLD-SCNC: 135 MMOL/L
TOTAL PROTEIN: 7.3 G/DL (ref 6.4–8.2)
TRIGL SERPL-MCNC: 133 MG/DL
TSH SERPL DL<=0.05 MIU/L-ACNC: 2.62 UIU/ML
VLDLC SERPL CALC-MCNC: 27 MG/DL
WBC # BLD: 4.9 10^3/ML

## 2025-04-30 ENCOUNTER — RESULTS FOLLOW-UP (OUTPATIENT)
Dept: FAMILY MEDICINE CLINIC | Age: 85
End: 2025-04-30

## 2025-05-22 RX ORDER — NYSTATIN 100000 U/G
CREAM TOPICAL
Qty: 30 G | Refills: 1 | Status: SHIPPED | OUTPATIENT
Start: 2025-05-22

## 2025-05-22 RX ORDER — CARBIDOPA AND LEVODOPA 25; 100 MG/1; MG/1
TABLET ORAL
Qty: 405 TABLET | Refills: 1 | Status: SHIPPED | OUTPATIENT
Start: 2025-05-22

## 2025-05-22 NOTE — TELEPHONE ENCOUNTER
We have received a refill request on the following medications:  Requested Prescriptions     Pending Prescriptions Disp Refills    carbidopa-levodopa (SINEMET)  MG per tablet [Pharmacy Med Name: CARB/LEVO TAB 25-100MG] 405 tablet 1     Sig: TAKE 1 AND 1/2 TABLETS 3   TIMES DAILY.       Date last time medication prescribed: 12/03/2024    Last Visit Date:  12/13/2024 with Neha Garnica MD    Next Visit Date:    6/16/2025 with Paige Leopold, CNP    Please approve or deny.

## 2025-05-22 NOTE — TELEPHONE ENCOUNTER
Last visit- 4/28/2025  Next visit- Visit date not found    Requested Prescriptions     Pending Prescriptions Disp Refills    nystatin (MYCOSTATIN) 528842 UNIT/GM cream [Pharmacy Med Name: NYSTATIN CRE 792242G] 30 g 1     Sig: APPLY TOPICALLY TWO TIMES ADAY

## 2025-06-16 ENCOUNTER — OFFICE VISIT (OUTPATIENT)
Dept: NEUROLOGY | Age: 85
End: 2025-06-16
Payer: MEDICARE

## 2025-06-16 VITALS
SYSTOLIC BLOOD PRESSURE: 132 MMHG | HEIGHT: 60 IN | DIASTOLIC BLOOD PRESSURE: 60 MMHG | BODY MASS INDEX: 33.77 KG/M2 | WEIGHT: 172 LBS | HEART RATE: 67 BPM

## 2025-06-16 DIAGNOSIS — R25.8 BRADYKINESIA: ICD-10-CM

## 2025-06-16 DIAGNOSIS — G25.81 RESTLESS LEG SYNDROME: ICD-10-CM

## 2025-06-16 DIAGNOSIS — G20.A1 PARKINSON'S DISEASE WITHOUT DYSKINESIA OR FLUCTUATING MANIFESTATIONS (HCC): Primary | ICD-10-CM

## 2025-06-16 PROCEDURE — 99214 OFFICE O/P EST MOD 30 MIN: CPT | Performed by: NURSE PRACTITIONER

## 2025-06-16 PROCEDURE — 1123F ACP DISCUSS/DSCN MKR DOCD: CPT | Performed by: NURSE PRACTITIONER

## 2025-06-16 PROCEDURE — 1159F MED LIST DOCD IN RCRD: CPT | Performed by: NURSE PRACTITIONER

## 2025-06-16 RX ORDER — CARBIDOPA AND LEVODOPA 25; 100 MG/1; MG/1
2 TABLET ORAL 3 TIMES DAILY
Qty: 180 TABLET | Refills: 4 | Status: SHIPPED | OUTPATIENT
Start: 2025-06-16

## 2025-06-16 NOTE — PATIENT INSTRUCTIONS
Change Sinemet 25/100 mg 2 tablets three times a day, take every 5 hours while awake, take at 8am, 1pm, and 6pm.  Continue with Mirapex 0.5 mg three times a day.   Melatonin over the counter, nightly  Continue following with recommendations from physical therapy [facility] program  Stay active as discussed   Follow up in 3-4 months or sooner if needed.   Call if any questions or concerns

## 2025-06-16 NOTE — PROGRESS NOTES
NEUROLOGY OUT PATIENT FOLLOW UP NOTE:  6/16/20251:55 PM    Kaylyn Hdz is here for follow up for parkinson's disease, RLS.              Allergies   Allergen Reactions    Penicillins      Unsure of reaction, childhood    Bactrim [Sulfamethoxazole-Trimethoprim]     Lipitor [Atorvastatin] Other (See Comments)     Muscle aches       Current Outpatient Medications:     carbidopa-levodopa (SINEMET)  MG per tablet, TAKE 1 AND 1/2 TABLETS 3   TIMES DAILY., Disp: 405 tablet, Rfl: 1    nystatin (MYCOSTATIN) 879281 UNIT/GM cream, APPLY TOPICALLY TWO TIMES ADAY, Disp: 30 g, Rfl: 1    alendronate (FOSAMAX) 70 MG tablet, TAKE 1 TABLET EVERY 7 DAYS, Disp: 12 tablet, Rfl: 3    rosuvastatin (CRESTOR) 10 MG tablet, TAKE 1 TABLET NIGHTLY, Disp: 90 tablet, Rfl: 1    apixaban (ELIQUIS) 5 MG TABS tablet, TAKE 1 TABLET TWICE A DAY, Disp: 180 tablet, Rfl: 1    levothyroxine (SYNTHROID) 50 MCG tablet, TAKE 1 TABLET DAILY FOR LOWTHYROID, Disp: 90 tablet, Rfl: 1    pramipexole (MIRAPEX) 0.5 MG tablet, Take 1 tablet by mouth 3 times daily, Disp: 270 tablet, Rfl: 3    melatonin 3 MG TABS tablet, Take 1 mg by mouth daily, Disp: , Rfl:     Cholecalciferol (VITAMIN D3) 125 MCG (5000 UT) TABS, Take 1 tablet by mouth daily, Disp: 90 tablet, Rfl: 3    Omega-3 300 MG CAPS, Take 2 capsules by mouth daily, Disp: 180 capsule, Rfl: 1    CALCIUM PO, Take 6 tablets by mouth daily Pt takes 700 mg daily, Disp: , Rfl:     NONFORMULARY, Respire Pink Micro Sleep Appliance, Disp: , Rfl:     ACETAMINOPHEN PO, Take 1,000 mg by mouth daily as needed (pain) , Disp: , Rfl:     I reviewed the past medical history, allergies, medications, social history and family history.       PE:   Vitals:    06/16/25 1335   BP: 132/60   Pulse: 67   Weight: 78 kg (172 lb)   Height: 1.524 m (5')          General Appearance:  awake, alert, oriented   Gen: NAD, Language is Intact. Skin: no rash, lesion, dry  to touch warm  Head: no rash, no icterus  Neck: The Neck is supple.

## 2025-06-28 DIAGNOSIS — E78.00 PURE HYPERCHOLESTEROLEMIA: ICD-10-CM

## 2025-06-28 DIAGNOSIS — R53.82 CHRONIC FATIGUE: ICD-10-CM

## 2025-06-28 DIAGNOSIS — E03.9 ACQUIRED HYPOTHYROIDISM: ICD-10-CM

## 2025-06-30 RX ORDER — ROSUVASTATIN CALCIUM 10 MG/1
10 TABLET, COATED ORAL NIGHTLY
Qty: 90 TABLET | Refills: 1 | Status: SHIPPED | OUTPATIENT
Start: 2025-06-30

## 2025-06-30 RX ORDER — LEVOTHYROXINE SODIUM 50 UG/1
TABLET ORAL
Qty: 90 TABLET | Refills: 1 | Status: SHIPPED | OUTPATIENT
Start: 2025-06-30

## 2025-06-30 RX ORDER — APIXABAN 5 MG/1
5 TABLET, FILM COATED ORAL 2 TIMES DAILY
Qty: 180 TABLET | Refills: 1 | Status: SHIPPED | OUTPATIENT
Start: 2025-06-30

## 2025-07-07 RX ORDER — NYSTATIN 100000 U/G
CREAM TOPICAL
Qty: 30 G | Refills: 1 | Status: SHIPPED | OUTPATIENT
Start: 2025-07-07

## 2025-08-22 RX ORDER — NYSTATIN 100000 U/G
CREAM TOPICAL
Qty: 30 G | Refills: 1 | Status: SHIPPED | OUTPATIENT
Start: 2025-08-22

## (undated) DEVICE — TUBING, SUCTION, 1/4" X 20', STRAIGHT: Brand: MEDLINE INDUSTRIES, INC.

## (undated) DEVICE — STAPLER INT L60MM DIA12MM STD TISS TI LNAR CUT LN 6 ROW

## (undated) DEVICE — SEALANT TISS 10 CC FIBRIN VISTASEAL

## (undated) DEVICE — BLADELESS OBTURATOR: Brand: WECK VISTA

## (undated) DEVICE — TOTAL TRAY, DB, 100% SILI FOLEY, 16FR 10: Brand: MEDLINE

## (undated) DEVICE — BANDAGE ADH W2XL4IN NITRL FAB STRP CURAD

## (undated) DEVICE — RELOAD STPL L60MM H1.5-3.6MM REG TISS BLU GRIPPING SURF B

## (undated) DEVICE — VESSEL SEALER EXTEND: Brand: ENDOWRIST

## (undated) DEVICE — BASIC SINGLE BASIN BTC-LF: Brand: MEDLINE INDUSTRIES, INC.

## (undated) DEVICE — BINDER ABD SM M W12IN CIRC 30 45IN 4 PNL E CNTCT CLSR POSTOP

## (undated) DEVICE — SOLUTION ANTIFOG VIS SYS CLEARIFY LAPSCP

## (undated) DEVICE — SHEET, T, LAPAROTOMY, STERILE: Brand: MEDLINE

## (undated) DEVICE — YANKAUER,BULB TIP,W/O VENT,RIGID,STERILE: Brand: MEDLINE

## (undated) DEVICE — GOWN,SIRUS,NON REINFRCD,LARGE,SET IN SL: Brand: MEDLINE

## (undated) DEVICE — 4-PORT MANIFOLD: Brand: NEPTUNE 2

## (undated) DEVICE — PUMP SUC IRR TBNG L10FT W/ HNDPC ASSEMB STRYKEFLOW 2

## (undated) DEVICE — GLOVE SURG SZ 6 THK91MIL LTX FREE SYN POLYISOPRENE ANTI

## (undated) DEVICE — GENERAL LAPAROSCOPY PACK-LF: Brand: MEDLINE INDUSTRIES, INC.

## (undated) DEVICE — POSITIONER HD W8XH4XL8.5IN RASPBERRY FOAM SLT

## (undated) DEVICE — TROCAR: Brand: KII SHIELDED BLADED ACCESS SYSTEM

## (undated) DEVICE — APPLICATOR LAP 45CM FLX 2 VISTASEAL

## (undated) DEVICE — COLUMN DRAPE

## (undated) DEVICE — SUTURE VCRL SZ 3-0 L27IN ABSRB UD FS-2 L19MM 1/2 CIR J423H

## (undated) DEVICE — COVER ARMBRD W13XL28.5IN IMPERV BLU FOR OP RM

## (undated) DEVICE — ARM DRAPE

## (undated) DEVICE — LINER SUCT CANSTR 1500CC SEMI RIG W/ POR HYDROPHOBIC SHUT

## (undated) DEVICE — CANNULA SEAL

## (undated) DEVICE — INSUFFLATION NEEDLE TO ESTABLISH PNEUMOPERITONEUM.: Brand: INSUFFLATION NEEDLE

## (undated) DEVICE — STRIP,CLOSURE,WOUND,MEDI-STRIP,1/2X4: Brand: MEDLINE

## (undated) DEVICE — GLOVE ORANGE PI 7   MSG9070

## (undated) DEVICE — BANDAGE ADH W1XL3IN NAT FAB WVN FLX DURABLE N ADH PD SEAL

## (undated) DEVICE — PENCIL SMK EVAC 10 FT BLADE ELECTRD ROCKER FOR TELSCP

## (undated) DEVICE — ELECTRO LUBE IS A SINGLE PATIENT USE DEVICE THAT IS INTENDED TO BE USED ON ELECTROSURGICAL ELECTRODES TO REDUCE STICKING.: Brand: KEY SURGICAL ELECTRO LUBE

## (undated) DEVICE — TIP COVER ACCESSORY

## (undated) DEVICE — PENCIL ES L3M BTTN SWCH HOLSTER W/ BLDE ELECTRD EDGE

## (undated) DEVICE — TROCAR: Brand: KII FIOS FIRST ENTRY

## (undated) DEVICE — WOUND RETRACTOR AND PROTECTOR: Brand: ALEXIS O WOUND PROTECTOR-RETRACTOR

## (undated) DEVICE — SYRINGE MED 30ML STD CLR PLAS LUERLOCK TIP N CTRL DISP

## (undated) DEVICE — TUBING INSUFFLATOR HEAT HUMIDIFIED SMK EVAC SET PNEUMOCLEAR